# Patient Record
Sex: MALE | Race: WHITE | Employment: UNEMPLOYED | URBAN - METROPOLITAN AREA
[De-identification: names, ages, dates, MRNs, and addresses within clinical notes are randomized per-mention and may not be internally consistent; named-entity substitution may affect disease eponyms.]

---

## 2018-02-06 ENCOUNTER — HOSPITAL ENCOUNTER (OUTPATIENT)
Dept: MRI IMAGING | Age: 53
Discharge: HOME | End: 2018-02-06
Attending: PSYCHIATRY & NEUROLOGY
Payer: MEDICARE

## 2018-02-06 DIAGNOSIS — R26.89 BALANCE PROBLEM: ICD-10-CM

## 2018-02-06 PROCEDURE — 74011250636 HC RX REV CODE- 250/636: Performed by: PSYCHIATRY & NEUROLOGY

## 2018-02-06 PROCEDURE — 72141 MRI NECK SPINE W/O DYE: CPT

## 2018-02-06 PROCEDURE — 70553 MRI BRAIN STEM W/O & W/DYE: CPT

## 2018-02-06 PROCEDURE — A9577 INJ MULTIHANCE: HCPCS | Performed by: PSYCHIATRY & NEUROLOGY

## 2018-02-06 RX ORDER — SODIUM CHLORIDE 0.9 % (FLUSH) 0.9 %
10 SYRINGE (ML) INJECTION
Status: COMPLETED | OUTPATIENT
Start: 2018-02-06 | End: 2018-02-06

## 2018-02-06 RX ADMIN — Medication 10 ML: at 18:25

## 2018-02-06 RX ADMIN — GADOBENATE DIMEGLUMINE 20 ML: 529 INJECTION, SOLUTION INTRAVENOUS at 18:24

## 2018-06-27 ENCOUNTER — HOSPITAL ENCOUNTER (OUTPATIENT)
Dept: PHYSICAL THERAPY | Age: 53
Discharge: HOME | End: 2018-06-27
Attending: PSYCHIATRY & NEUROLOGY
Payer: MEDICARE

## 2018-06-27 DIAGNOSIS — M79.671 PAIN IN BOTH FEET: ICD-10-CM

## 2018-06-27 DIAGNOSIS — M79.672 PAIN IN BOTH FEET: ICD-10-CM

## 2018-06-27 DIAGNOSIS — R20.2 NUMBNESS AND TINGLING OF BOTH FEET: ICD-10-CM

## 2018-06-27 DIAGNOSIS — G62.9 NEUROPATHY: ICD-10-CM

## 2018-06-27 DIAGNOSIS — R20.0 NUMBNESS AND TINGLING OF BOTH FEET: ICD-10-CM

## 2018-06-27 PROCEDURE — 97112 NEUROMUSCULAR REEDUCATION: CPT

## 2018-06-27 PROCEDURE — G8979 MOBILITY GOAL STATUS: HCPCS

## 2018-06-27 PROCEDURE — G8978 MOBILITY CURRENT STATUS: HCPCS

## 2018-06-27 PROCEDURE — 97162 PT EVAL MOD COMPLEX 30 MIN: CPT

## 2018-06-27 NOTE — THERAPY EVALUATION
Heriberto Carter  : 1965  Primary: Edilma Gonsales Medicare Hmo  Secondary:  [address] at [place] [id number] [facility], [address] [place] [address],[address], [address] [facility]  [phone]   [phone]          OUTPATIENT PHYSICAL THERAPY:Initial Assessment    ICD-10: Treatment Diagnosis:   · Other abnormalities of gait and mobility (R26.89)  · Difficulty in walking, not elsewhere classified (R26.2)  Precautions/Allergies:   Review of patient's allergies indicates no known allergies. Fall Risk Score: 2 (? 5 = High Risk)  MD Orders: Evaluate and Treat MEDICAL/REFERRING DIAGNOSIS:  Numbness and tingling of both feet [R20.0, R20.2]  Pain in both feet [M79.671, M79.672]  Neuropathy [G62.9]   DATE OF ONSET: Chronic  REFERRING PHYSICIAN: Rafiq KOEHLER DO  RETURN PHYSICIAN APPOINTMENT: TBD     INITIAL ASSESSMENT:  Mr. Elida Martinez presents with decreased mobility, decreased strength, decreased safety with ambulation, and decreased balance secondary to degenerative changes. After discussing with patient, he agreed he would benefit from physical therapy to improve above deficits. Please sign this plan of treatment if you concur. Thank you for the opportunity to serve this patient. PROBLEM LIST (Impacting functional limitations):  1. Decreased Strength  2. Decreased Balance  3. Decreased Activity Tolerance INTERVENTIONS PLANNED:  1. Balance Exercise  2. Gait Training  3. Home Exercise Program (HEP)  4. Neuromuscular Re-education/Strengthening  5. Therapeutic Exercise/Strengthening   TREATMENT PLAN:  Effective Dates: 2018 TO 2018 (90 days). Frequency/Duration: 2 times a week for 90 Days  GOALS: (Goals have been discussed and agreed upon with patient.)  Short-Term Functional Goals: Time Frame: 30 days  1. Patient will be independent with home exercise program without exacerbation of symptoms or cueing needed. Discharge Goals: Time Frame: 90 days  1.  Patient will be independent with all ADLs with minimal fear of falling and loss of balance with daily tasks. 2. Patient will report no fear avoidance with social or recreational activities due to fear of falling. 3. Patient will score greater than or equal to 39/56 on Joiner Balance Scale with minimal effect of imbalance on patient's ability to manage every day life activities. 4. Patient will score greater than or equal to 19/24 on Dynamic Gait Index with minimal effect of imbalance on patient's ability to manage every day life activities. Rehabilitation Potential For Stated Goals: Good  Regarding Elie Luis's therapy, I certify that the treatment plan above will be carried out by a therapist or under their direction. Thank you for this referral,  Kenya López, ANETA     Referring Physician Signature: Jovon Gore DO              Date                    The information in this section was collected on 6/27/2018 (except where otherwise noted). HISTORY:   History of Present Injury/Illness (Reason for Referral):  Patient reports that he began having trouble with his feet/walking about 2 years ago. He reports that he has fallen in the past.  He reports that he has numbness in his feet and ankles bilaterally. He reports that he know that most of his trouble is his fear of falling or becoming off balance. He reports that he has to use his cane when walking in the community, but is able to walk with nothing while in his home. He reports that he would like to improve his overall mobility and safety with ambulation so that he doesn't fall and isn't afraid of falling. Past Medical History/Comorbidities:   Mr. Monet Malcolm  has a past medical history of Hypertension; Other ill-defined conditions(799.89); and Psychiatric disorder. He also has no past medical history of Arthritis; Asthma; Autoimmune disease ([address] CAD (coronary artery disease); Cancer ([address] Chronic kidney disease; COPD; DEMENTIA; Diabetes ([address]  Endocrine disease; Gastrointestinal disorder; Heart failure ([facility] Infectious disease; Liver disease; Neurological disorder; PUD (peptic ulcer disease); Seizures ([facility] Sleep disorder; Stroke [facility]); or Thromboembolus ([facility] Mr. Seb Garcia  has a past surgical history that includes hx orthopaedic. Social History/Living Environment:   Home Environment: Private residence  Living Alone: No  Support Systems: Family member(s), Friends \ neighbors  Prior Level of Function/Work/Activity:  Independent  Dominant Side:         RIGHT  Personal Factors:          Sex:  male        Age:  48 y.o.   Current Medications:     Medications (PRN)    Date Last Reviewed:  6/27/2018    Number of Personal Factors/Comorbidities that affect the Plan of Care: 1-2: MODERATE COMPLEXITY   EXAMINATION:   Observation/Orthostatic Postural Assessment:          Posture Assessment: Rounded shoulders, Forward head, Wide stance with feet beyond shoulder width apart, B LE in external rotation   Functional Mobility:         Gait/Ambulation:     Base of Support: Center of gravity altered  Speed/Haleigh: Pace decreased (<100 feet/min)  Step Length: Left shortened, Right shortened  Swing Pattern: Left asymmetrical, Right asymmetrical  Stance: Left increased, Right increased  Gait Abnormalities: Antalgic - B LE in abduction and external rotation  Ambulation - Level of Assistance: Modified independent  Assistive Device: Cane, straight  · Interventions: Verbal cues, Safety awareness training          Transfers:     Sit to Stand: Modified independent  Stand to Sit: Modified independent  Stand Pivot Transfers: Modified independent  Bed to Chair: Modified independent  Lateral Transfers: Modified independent         Bed Mobility:     Rolling: Independent  Supine to Sit: Independent  Sit to Supine: Independent  Scooting: Independent   Strength:     LE STRENGTH:  4/5 hip flexion, 2/5 hip abduction, 2/5 hip adduction, 2/5 hip extension, 3/5 knee extension, 3/5 knee flexion, 2/5 ankle dorsiflexion, 3/5 ankle plantarflexion, 2/5 ankle inversion, 2/5 ankle eversion. Sensation:         Intact to mid-shin/gastroc region - reports numbness/decreased sensation to toes  Postural Control & Balance:  · Joiner Balance Scale:  19/56.   (A score less than 45/56 indicates high risk of falls)     · Dynamic Gait Index:  7/24. (A score less than or equal to19/24 is abnormal and predictive of falls)        Body Structures Involved:  1. Nerves  2. Muscles Body Functions Affected:  1. Neuromusculoskeletal  2. Movement Related Activities and Participation Affected:  1. Mobility  2. Self Care   Number of elements (examined above) that affect the Plan of Care: 4+: HIGH COMPLEXITY   CLINICAL PRESENTATION:   Presentation: Evolving clinical presentation with changing clinical characteristics: MODERATE COMPLEXITY   CLINICAL DECISION MAKING:   Outcome Measure: Tool Used: Joiner Balance Scale  Score:  Initial: 19/56 Most Recent: X/56 (Date: -- )   Interpretation of Score: Each section is scored on a 0-4 scale, 0 representing the patients inability to perform the task and 4 representing independence. The scores of each section are added together for a total score of 56. The higher the patients score, the more independent the patient is. Any score below 45 indicates increased risk for falls. Score 56 55-45 44-34 33-23 22-12 11-1 0   Modifier CH CI CJ CK CL CM CN     Tool Used: Dynamic Gait Index  Score:  Initial: 7/24 Most Recent: X/24 (Date: -- )   Interpretation of Score: Each section is scored on a 0-3 scale, 0 representing the patients inability to perform the task and 3 representing independence. The scores of each section are added together for a total score of 24. Any score below 19 indicates increased risk for falls. Score 24 23-19 18-15 14-10 9-5 4-1 0   Modifier CH CI CJ CK CL CM CN     ?  Mobility - Walking and Moving Around:     - CURRENT STATUS: CL - 60%-79% impaired, limited or restricted    - GOAL STATUS: CJ - 20%-39% impaired, limited or restricted    - D/C STATUS:  ---------------To be determined---------------    Medical Necessity:   · Patient is expected to demonstrate progress in strength, range of motion, balance and coordination to improve safety during daily activities. · Patient demonstrates good rehab potential due to higher previous functional level. · Skilled intervention continues to be required due to decreased mobility. Reason for Services/Other Comments:  · Patient continues to demonstrate capacity to improve overall functional mobility which will increase independence and increase safety. Use of outcome tool(s) and clinical judgement create a POC that gives a: Questionable prediction of patient's progress: MODERATE COMPLEXITY            TREATMENT:   (In addition to Assessment/Re-Assessment sessions the following treatments were rendered)  Pre-treatment Symptoms/Complaints:  6/27/2018: Patient reports he is tired of being afraid he will fall   Pain: Initial: Pain Intensity 1: 0  Post Session:  0/10     NEUROMUSCULAR RE-EDUCATION: (15 minutes):  Exercise/activities per grid below to improve balance, coordination, kinesthetic sense, posture and proprioception. Required minimal visual, verbal and manual cues to promote static and dynamic balance in standing, promote coordination of bilateral, lower extremity(s) and promote motor control of bilateral, lower extremity(s). Date:  6/27/2018   Activity/Exercise Parameters   Static stance with eyes open with narrow base of support HEP   Static stance with eyes closed in normal stance HEP   Standing hip abduction HEP      Med[employer] Portal  Treatment/Session Assessment:    · Response to Treatment:  Patient tolerated assessment without loss of balance, but increased fatigue. Patient verbalized and demonstrated understanding of HEP. · Compliance with Program/Exercises: Will assess as treatment progresses.   · Recommendations/Intent for next treatment session: \"Next visit will focus on advancements to more challenging activities\".   Total Treatment Duration:  PT Patient Time In/Time Out  Time In: 4635  Time Out: [address], PT

## 2018-06-27 NOTE — PROGRESS NOTES
Rick Da Silva  : 1965 [facility] [place] [address],[address], [place] [address].  [phone]   [phone]          OUTPATIENT ORTHOPAEDIC PHYSICAL THERAPY    NAME/AGE/GENDER: Rick Da Silva is a 48 y.o. male    DATE: 2018                       Ambulatory/Rehab Services H2 Model Falls Risk Assessment    Risk Factor Pts. ·   Confusion/Disorientation/Impulsivity  []    4 ·   Symptomatic Depression  []   2 ·   Altered Elimination  []   1 ·   Dizziness/Vertigo  []   1 ·   Gender (Male)  [x]   1 ·   Any administered antiepileptics (anticonvulsants):  []   2 ·   Any administered benzodiazepines:  []   1 ·   Visual Impairment (specify):  []   1 ·   Portable Oxygen Use  []   1 ·   Orthostatic ? BP  []   1 ·   History of Recent Falls (within 3 mos.)  []   5     Ability to Rise from Chair (choose one) Pts. ·   Ability to rise in a single movement  []   0 ·   Pushes up, successful in one attempt  [x]   1 ·   Multiple attempts, but unsuccessful  []   3 ·   Unable to rise without assistance  []   4   Total: (5 or greater = High Risk) 2     Falls Prevention Plan:   []                Physical Limitations to Exercise (specify):   []                Mobility Assistance Device (type):   []                Exercise/Equipment Adaptation (specify):    © [facility] of [place] [address] [place] [address] States Patent #[id number].  Federal Law prohibits the replication, distribution or use without written permission from [facility] of [address] [place] [address]

## 2018-07-10 ENCOUNTER — HOSPITAL ENCOUNTER (OUTPATIENT)
Dept: PHYSICAL THERAPY | Age: 53
Discharge: HOME | End: 2018-07-10
Attending: PSYCHIATRY & NEUROLOGY
Payer: MEDICARE

## 2018-07-10 PROCEDURE — 97112 NEUROMUSCULAR REEDUCATION: CPT

## 2018-07-10 NOTE — PROGRESS NOTES
Luane Shone  : 1965  Primary: Yoselyn Gonsales Medicare Hmo  Secondary:  [address] at [place] [id number] [facility], [address] [place] [address],[address], [facility].  [phone]   [phone]          OUTPATIENT PHYSICAL THERAPY:Daily Note    ICD-10: Treatment Diagnosis:   · Other abnormalities of gait and mobility (R26.89)  · Difficulty in walking, not elsewhere classified (R26.2)  Precautions/Allergies:   Review of patient's allergies indicates no known allergies. Fall Risk Score: 2 (? 5 = High Risk)  MD Orders: Evaluate and Treat MEDICAL/REFERRING DIAGNOSIS:  Numbness and tingling of both feet [R20.0, R20.2]  Pain in both feet [M79.671, M79.672]  Neuropathy [G62.9]   DATE OF ONSET: Chronic  REFERRING PHYSICIAN: Binu KOEHLER, DO  RETURN PHYSICIAN APPOINTMENT: TBD     INITIAL ASSESSMENT:  Mr. Katie Fajardo presents with decreased mobility, decreased strength, decreased safety with ambulation, and decreased balance secondary to degenerative changes. After discussing with patient, he agreed he would benefit from physical therapy to improve above deficits. Please sign this plan of treatment if you concur. Thank you for the opportunity to serve this patient. PROBLEM LIST (Impacting functional limitations):  1. Decreased Strength  2. Decreased Balance  3. Decreased Activity Tolerance INTERVENTIONS PLANNED:  1. Balance Exercise  2. Gait Training  3. Home Exercise Program (HEP)  4. Neuromuscular Re-education/Strengthening  5. Therapeutic Exercise/Strengthening   TREATMENT PLAN:  Effective Dates: 2018 TO 2018 (90 days). Frequency/Duration: 2 times a week for 90 Days  GOALS: (Goals have been discussed and agreed upon with patient.)  Short-Term Functional Goals: Time Frame: 30 days  1. Patient will be independent with home exercise program without exacerbation of symptoms or cueing needed. Discharge Goals: Time Frame: 90 days  1.  Patient will be independent with all ADLs with minimal fear of falling and loss of balance with daily tasks. 2. Patient will report no fear avoidance with social or recreational activities due to fear of falling. 3. Patient will score greater than or equal to 39/56 on Joiner Balance Scale with minimal effect of imbalance on patient's ability to manage every day life activities. 4. Patient will score greater than or equal to 19/24 on Dynamic Gait Index with minimal effect of imbalance on patient's ability to manage every day life activities. Rehabilitation Potential For Stated Goals: Good  Regarding Elie Luis's therapy, I certify that the treatment plan above will be carried out by a therapist or under their direction. Thank you for this referral,  Surekha Liu PT     Referring Physician Signature: Jovon Gore DO              Date                    The information in this section was collected on 6/27/2018 (except where otherwise noted). HISTORY:   History of Present Injury/Illness (Reason for Referral):  Patient reports that he began having trouble with his feet/walking about 2 years ago. He reports that he has fallen in the past.  He reports that he has numbness in his feet and ankles bilaterally. He reports that he know that most of his trouble is his fear of falling or becoming off balance. He reports that he has to use his cane when walking in the community, but is able to walk with nothing while in his home. He reports that he would like to improve his overall mobility and safety with ambulation so that he doesn't fall and isn't afraid of falling. Past Medical History/Comorbidities:   Mr. Monet Malcolm  has a past medical history of Hypertension; Other ill-defined conditions(799.89); and Psychiatric disorder. He also has no past medical history of Arthritis; Asthma; Autoimmune disease ([address] CAD (coronary artery disease); Cancer ([address] Chronic kidney disease; COPD; DEMENTIA; Diabetes ([address]  Endocrine disease; Gastrointestinal disorder; Heart failure ([facility] Infectious disease; Liver disease; Neurological disorder; PUD (peptic ulcer disease); Seizures ([facility] Sleep disorder; Stroke [facility]); or Thromboembolus ([facility] Mr. Mary Lou Gregory  has a past surgical history that includes hx orthopaedic. Social History/Living Environment:   Home Environment: Private residence  Living Alone: No  Support Systems: Family member(s), Friends \ neighbors  Prior Level of Function/Work/Activity:  Independent  Dominant Side:         RIGHT  Personal Factors:          Sex:  male        Age:  48 y.o.   Current Medications:     Medications (PRN)    Date Last Reviewed:  7/10/2018    Number of Personal Factors/Comorbidities that affect the Plan of Care: 1-2: MODERATE COMPLEXITY   EXAMINATION:   Observation/Orthostatic Postural Assessment:          Posture Assessment: Rounded shoulders, Forward head, Wide stance with feet beyond shoulder width apart, B LE in external rotation   Functional Mobility:         Gait/Ambulation:     Base of Support: Center of gravity altered  Speed/Haleigh: Pace decreased (<100 feet/min)  Step Length: Left shortened, Right shortened  Swing Pattern: Left asymmetrical, Right asymmetrical  Stance: Left increased, Right increased  Gait Abnormalities: Antalgic - B LE in abduction and external rotation  Ambulation - Level of Assistance: Modified independent  Assistive Device: Cane, straight  · Interventions: Verbal cues, Safety awareness training          Transfers:     Sit to Stand: Modified independent  Stand to Sit: Modified independent  Stand Pivot Transfers: Modified independent  Bed to Chair: Modified independent  Lateral Transfers: Modified independent         Bed Mobility:     Rolling: Independent  Supine to Sit: Independent  Sit to Supine: Independent  Scooting: Independent   Strength:     LE STRENGTH:  4/5 hip flexion, 2/5 hip abduction, 2/5 hip adduction, 2/5 hip extension, 3/5 knee extension, 3/5 knee flexion, 2/5 ankle dorsiflexion, 3/5 ankle plantarflexion, 2/5 ankle inversion, 2/5 ankle eversion. Sensation:         Intact to mid-shin/gastroc region - reports numbness/decreased sensation to toes  Postural Control & Balance:  · Joiner Balance Scale:  19/56.   (A score less than 45/56 indicates high risk of falls)     · Dynamic Gait Index:  7/24. (A score less than or equal to19/24 is abnormal and predictive of falls)        Body Structures Involved:  1. Nerves  2. Muscles Body Functions Affected:  1. Neuromusculoskeletal  2. Movement Related Activities and Participation Affected:  1. Mobility  2. Self Care   Number of elements (examined above) that affect the Plan of Care: 4+: HIGH COMPLEXITY   CLINICAL PRESENTATION:   Presentation: Evolving clinical presentation with changing clinical characteristics: MODERATE COMPLEXITY   CLINICAL DECISION MAKING:   Outcome Measure: Tool Used: Joiner Balance Scale  Score:  Initial: 19/56 Most Recent: X/56 (Date: -- )   Interpretation of Score: Each section is scored on a 0-4 scale, 0 representing the patients inability to perform the task and 4 representing independence. The scores of each section are added together for a total score of 56. The higher the patients score, the more independent the patient is. Any score below 45 indicates increased risk for falls. Score 56 55-45 44-34 33-23 22-12 11-1 0   Modifier CH CI CJ CK CL CM CN     Tool Used: Dynamic Gait Index  Score:  Initial: 7/24 Most Recent: X/24 (Date: -- )   Interpretation of Score: Each section is scored on a 0-3 scale, 0 representing the patients inability to perform the task and 3 representing independence. The scores of each section are added together for a total score of 24. Any score below 19 indicates increased risk for falls. Score 24 23-19 18-15 14-10 9-5 4-1 0   Modifier CH CI CJ CK CL CM CN     ?  Mobility - Walking and Moving Around:     - CURRENT STATUS: CL - 60%-79% impaired, limited or restricted    - GOAL STATUS: CJ - 20%-39% impaired, limited or restricted    - D/C STATUS:  ---------------To be determined---------------    Medical Necessity:   · Patient is expected to demonstrate progress in strength, range of motion, balance and coordination to improve safety during daily activities. · Patient demonstrates good rehab potential due to higher previous functional level. · Skilled intervention continues to be required due to decreased mobility. Reason for Services/Other Comments:  · Patient continues to demonstrate capacity to improve overall functional mobility which will increase independence and increase safety. 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 No falls\". Patient arrived 15 minutes late for his appointment. Pain: Initial: Pain Intensity 1: 0  Post Session:  0/10     NEUROMUSCULAR RE-EDUCATION: (30 minutes):  Exercise/activities per grid below to improve balance, coordination, kinesthetic sense, posture and proprioception. Required minimal visual, verbal and manual cues to promote static and dynamic balance in standing, promote coordination of bilateral, lower extremity(s) and promote motor control of bilateral, lower extremity(s).    Balance/Vestibular Treatment:   Activity   Date  7/10/18 Date Date Date Date Date   Activity/Exercise   Sets/reps/equipment Sets/reps/  equipment Sets/reps/  equipment Sets/reps/  equipment Sets/reps/  equipment Sets/reps/  equipment   Walking with head turns     4 laps        Walking with head up & down             Step ups     6 inch  2x10        Step taps     6 inch  2x10        Marching   4 laps        Sidestepping   4 laps        Crossovers           Phuong Walking  backwards             Tandem walking           Weaving in/out of cones             Picking up cones             Stepping over half foam     2x10        Ball toss           Kick ball           Figure 8s            Circles right/left           Walking with 360 degree turns           Spirals           Weight shifting:    Left & Right             Weight shifting: Forward & Backward              Static Standing Balance     Blue foam eyes open/eyes closed        Standing with feet apart             Standing with feet together             Standing with feet semitandem           Standing with feet tandem           Single leg stance           X1/X2 Viewing exercises             Hallpike-[place] testing for BPPV (Benign Paroxysmal Positional Vertigo)             Monet-Daroff exercises           Canalith Repositioning treatment/Epley Maneuver  for BPPV (Benign Paroxysmal Positional Vertigo)           Smart Equit[employer] Training: See scanned report. [employer] Portal  Treatment/Session Assessment:    · Response to Treatment:  Patient has increased fear/anxiety with balance exercises. Continue to progress to tolerance. · Compliance with Program/Exercises: Will assess as treatment progresses. · Recommendations/Intent for next treatment session: \"Next visit will focus on advancements to more challenging activities\".   Total Treatment Duration:  PT Patient Time In/Time Out  Time In: 1600  Time Out: Fabian Spencer 4681

## 2018-07-13 ENCOUNTER — HOSPITAL ENCOUNTER (OUTPATIENT)
Dept: PHYSICAL THERAPY | Age: 53
Discharge: HOME | End: 2018-07-13
Attending: PSYCHIATRY & NEUROLOGY
Payer: MEDICARE

## 2018-07-13 PROCEDURE — 97112 NEUROMUSCULAR REEDUCATION: CPT

## 2018-07-13 NOTE — PROGRESS NOTES
Haroldo Mesa  : 1965  Primary: Jorge Gonsales [employer] Hmo  Secondary:  [address]  at [place] [address] [facility], [address] [place] [address],[address], [facility] [address]  [phone]   [phone]          OUTPATIENT PHYSICAL THERAPY:Daily Note    ICD-10: Treatment Diagnosis:   · Other abnormalities of gait and mobility (R26.89)  · Difficulty in walking, not elsewhere classified (R26.2)  Precautions/Allergies:   Review of patient's allergies indicates no known allergies. Fall Risk Score: 2 (? 5 = High Risk)  MD Orders: Evaluate and Treat MEDICAL/REFERRING DIAGNOSIS:  Numbness and tingling of both feet [R20.0, R20.2]  Pain in both feet [M79.671, M79.672]  Neuropathy [G62.9]   DATE OF ONSET: Chronic  REFERRING PHYSICIAN: DO BETI Garcia PHYSICIAN APPOINTMENT: TBD     ASSESSMENT:  Mr. Lillian Mcguire presents with decreased mobility, decreased strength, decreased safety with ambulation, and decreased balance secondary to degenerative changes. Patient has attended a total of 3 scheduled physical therapy visits including initial evaluation on 2018. Treatment has consisted of balance and strengthening to improve overall mobility, stability, and performance with activities of daily living. PROBLEM LIST (Impacting functional limitations):  1. Decreased Strength  2. Decreased Balance  3. Decreased Activity Tolerance INTERVENTIONS PLANNED:  1. Balance Exercise  2. Gait Training  3. Home Exercise Program (HEP)  4. Neuromuscular Re-education/Strengthening  5. Therapeutic Exercise/Strengthening   TREATMENT PLAN:  Effective Dates: 2018 TO 2018 (90 days). Frequency/Duration: 2 times a week for 90 Days  GOALS: (Goals have been discussed and agreed upon with patient.)  Short-Term Functional Goals: Time Frame: 30 days  1. Patient will be independent with home exercise program without exacerbation of symptoms or cueing needed. Discharge Goals: Time Frame: 90 days  1.  Patient will be independent with all ADLs with minimal fear of falling and loss of balance with daily tasks. 2. Patient will report no fear avoidance with social or recreational activities due to fear of falling. 3. Patient will score greater than or equal to 39/56 on Joiner Balance Scale with minimal effect of imbalance on patient's ability to manage every day life activities. 4. Patient will score greater than or equal to 19/24 on Dynamic Gait Index with minimal effect of imbalance on patient's ability to manage every day life activities. Rehabilitation Potential For Stated Goals: Good            The information in this section was collected on 6/27/2018 (except where otherwise noted). HISTORY:   History of Present Injury/Illness (Reason for Referral):  Patient reports that he began having trouble with his feet/walking about 2 years ago. He reports that he has fallen in the past.  He reports that he has numbness in his feet and ankles bilaterally. He reports that he know that most of his trouble is his fear of falling or becoming off balance. He reports that he has to use his cane when walking in the community, but is able to walk with nothing while in his home. He reports that he would like to improve his overall mobility and safety with ambulation so that he doesn't fall and isn't afraid of falling. Past Medical History/Comorbidities:   Mr. Elida Martinez  has a past medical history of Hypertension; Other ill-defined conditions(799.89); and Psychiatric disorder. He also has no past medical history of Arthritis; Asthma; Autoimmune disease ([address] CAD (coronary artery disease); Cancer ([address] Chronic kidney disease; COPD; DEMENTIA; Diabetes ([address] Endocrine disease; Gastrointestinal disorder; Heart failure ([address] Infectious disease; Liver disease; Neurological disorder; PUD (peptic ulcer disease); Seizures ([address] Sleep disorder; Stroke [facility]); or Thromboembolus ([address]   Mr. Elida Martinez  has a past surgical history that includes hx orthopaedic. Social History/Living Environment:   Home Environment: Private residence  Living Alone: No  Support Systems: Family member(s), Friends \ neighbors  Prior Level of Function/Work/Activity:  Independent  Dominant Side:         RIGHT  Personal Factors:          Sex:  male        Age:  48 y.o. Current Medications:     Medications (PRN)    Date Last Reviewed:  7/13/2018    Number of Personal Factors/Comorbidities that affect the Plan of Care: 1-2: MODERATE COMPLEXITY   EXAMINATION:   Observation/Orthostatic Postural Assessment:          Posture Assessment: Rounded shoulders, Forward head, Wide stance with feet beyond shoulder width apart, B LE in external rotation   Functional Mobility:         Gait/Ambulation:     Base of Support: Center of gravity altered  Speed/Haleigh: Pace decreased (<100 feet/min)  Step Length: Left shortened, Right shortened  Swing Pattern: Left asymmetrical, Right asymmetrical  Stance: Left increased, Right increased  Gait Abnormalities: Antalgic - B LE in abduction and external rotation  Ambulation - Level of Assistance: Modified independent  Assistive Device: Cane, straight  · Interventions: Verbal cues, Safety awareness training          Transfers:     Sit to Stand: Modified independent  Stand to Sit: Modified independent  Stand Pivot Transfers: Modified independent  Bed to Chair: Modified independent  Lateral Transfers: Modified independent         Bed Mobility:     Rolling: Independent  Supine to Sit: Independent  Sit to Supine: Independent  Scooting: Independent   Strength:     LE STRENGTH:  4/5 hip flexion, 2/5 hip abduction, 2/5 hip adduction, 2/5 hip extension, 3/5 knee extension, 3/5 knee flexion, 2/5 ankle dorsiflexion, 3/5 ankle plantarflexion, 2/5 ankle inversion, 2/5 ankle eversion.   Sensation:         Intact to mid-shin/gastroc region - reports numbness/decreased sensation to toes  Postural Control & Balance:  · Joiner Balance Scale:  19/56.   (A score less than 45/56 indicates high risk of falls)     · Dynamic Gait Index:  7/24. (A score less than or equal to19/24 is abnormal and predictive of falls)        Body Structures Involved:  1. Nerves  2. Muscles Body Functions Affected:  1. Neuromusculoskeletal  2. Movement Related Activities and Participation Affected:  1. Mobility  2. Self Care   Number of elements (examined above) that affect the Plan of Care: 4+: HIGH COMPLEXITY   CLINICAL PRESENTATION:   Presentation: Evolving clinical presentation with changing clinical characteristics: MODERATE COMPLEXITY   CLINICAL DECISION MAKING:   Outcome Measure: Tool Used: Joiner Balance Scale  Score:  Initial: 19/56 Most Recent: X/56 (Date: -- )   Interpretation of Score: Each section is scored on a 0-4 scale, 0 representing the patients inability to perform the task and 4 representing independence. The scores of each section are added together for a total score of 56. The higher the patients score, the more independent the patient is. Any score below 45 indicates increased risk for falls. Score 56 55-45 44-34 33-23 22-12 11-1 0   Modifier CH CI CJ CK CL CM CN     Tool Used: Dynamic Gait Index  Score:  Initial: 7/24 Most Recent: X/24 (Date: -- )   Interpretation of Score: Each section is scored on a 0-3 scale, 0 representing the patients inability to perform the task and 3 representing independence. The scores of each section are added together for a total score of 24. Any score below 19 indicates increased risk for falls. Score 24 23-19 18-15 14-10 9-5 4-1 0   Modifier CH CI CJ CK CL CM CN     ?  Mobility - Walking and Moving Around:     - CURRENT STATUS: CL - 60%-79% impaired, limited or restricted    - GOAL STATUS: CJ - 20%-39% impaired, limited or restricted    - D/C STATUS:  ---------------To be determined---------------    Medical Necessity:   · Patient is expected to demonstrate progress in strength, range of motion, balance and coordination to improve safety during daily activities. · Patient demonstrates good rehab potential due to higher previous functional level. · Skilled intervention continues to be required due to decreased mobility. Reason for Services/Other Comments:  · Patient continues to demonstrate capacity to improve overall functional mobility which will increase independence and increase safety. Use of outcome tool(s) and clinical judgement create a POC that gives a: Questionable prediction of patient's progress: MODERATE COMPLEXITY            TREATMENT:   (In addition to Assessment/Re-Assessment sessions the following treatments were rendered)  Pre-treatment Symptoms/Complaints:  7/13/2018: Patient reports his anxiety is up today so he is not feeling well. Pain: Initial: Pain Intensity 1: 0  Post Session:  0/10     NEUROMUSCULAR RE-EDUCATION: (45 minutes):  Exercise/activities per grid below to improve balance, coordination, kinesthetic sense, posture and proprioception. Required minimal visual, verbal and manual cues to promote static and dynamic balance in standing, promote coordination of bilateral, lower extremity(s) and promote motor control of bilateral, lower extremity(s).    Balance/Vestibular Treatment:   Activity   Date  7/10/18 Date  7/13/2018 Date   Activity/Exercise   Sets/reps/equipment Sets/reps/  equipment Sets/reps/  equipment   Walking with head turns     4 laps 4 laps    Walking with head up & down          Step ups     6 inch  2x10     Step taps     6 inch  2x10 6 inch  2x10    Marching   4 laps 4 laps    Sidestepping   4 laps 4 laps    Crossovers        [place]        Walking  backwards          Walking with narrow base of support    4 laps    Weaving in/out of cones          Picking up cones          Stepping over half foam     2x10     [employer]        Kick ball        Figure 8s         Circles right/left        Walking with 360 degree turns        Spirals        Weight shifting:    Left & Right          Weight shifting: Forward & Backward           Static Standing Balance     Blue foam eyes open/eyes closed Blue foam    Eyes open weight shifting lateral    Eyes closed  Static  3 reps  5 second holds    Standing with feet apart          Standing with feet together          Standing with feet semitandem    Blue foam    Eyes open weight shifting forward and backward    Eyes closed  Static  3 reps  5 second holds    Standing with feet tandem        Single leg stance          [facility] [place]  Treatment/Session Assessment:    · Response to Treatment:  Patient completed all exercises and was willing to try new exercises. Patient needed moderate encouragement to complete activities. No loss of balance. · Compliance with Program/Exercises: Will assess as treatment progresses. · Recommendations/Intent for next treatment session: \"Next visit will focus on advancements to more challenging activities\".   Total Treatment Duration:  PT Patient Time In/Time Out  Time In: 1430  Time Out: 300 [employer] [place], PT

## 2018-07-17 ENCOUNTER — HOSPITAL ENCOUNTER (OUTPATIENT)
Dept: PHYSICAL THERAPY | Age: 53
Discharge: HOME | End: 2018-07-17
Attending: PSYCHIATRY & NEUROLOGY
Payer: MEDICARE

## 2018-07-17 NOTE — PROGRESS NOTES
Marina Buckner  : 1965  Primary: Ro Gonsales Medicare Hmo  Secondary:  [address]  at [facility], [address] [place] [address],[address], [address] [facility]  [phone]   [phone]     OUTPATIENT DAILY NOTE    NAME/AGE/GENDER: Marina Buckner is a 48 y.o. male. DATE: 2018    Patient canceled for appointment today due to unknown reason. Will plan to follow up on next scheduled visit.     Doug Moss, PT

## 2018-07-19 ENCOUNTER — HOSPITAL ENCOUNTER (OUTPATIENT)
Dept: PHYSICAL THERAPY | Age: 53
Discharge: HOME | End: 2018-07-19
Attending: PSYCHIATRY & NEUROLOGY
Payer: MEDICARE

## 2018-07-19 PROCEDURE — 97112 NEUROMUSCULAR REEDUCATION: CPT

## 2018-07-19 NOTE — PROGRESS NOTES
Enoc Gutierres  : 1965  Primary: Essence Gonsales Medicare Hmo  Secondary:  [address] at [place] [id number] [facility], [address] [place] [address],[address], [facility] [address]  [phone]   [phone]          OUTPATIENT PHYSICAL THERAPY:Daily Note    ICD-10: Treatment Diagnosis:   · Other abnormalities of gait and mobility (R26.89)  · Difficulty in walking, not elsewhere classified (R26.2)  Precautions/Allergies:   Review of patient's allergies indicates no known allergies. Fall Risk Score: 2 (? 5 = High Risk)  MD Orders: Evaluate and Treat MEDICAL/REFERRING DIAGNOSIS:  Numbness and tingling of both feet [R20.0, R20.2]  Pain in both feet [M79.671, M79.672]  Neuropathy [G62.9]   DATE OF ONSET: Chronic  REFERRING PHYSICIAN: Carlos Gayle, DO  RETURN PHYSICIAN APPOINTMENT: TBD     ASSESSMENT:  Mr. Shama Martinez presents with decreased mobility, decreased strength, decreased safety with ambulation, and decreased balance secondary to degenerative changes. Patient has attended a total of 4 scheduled physical therapy visits including initial evaluation on 2018. Treatment has consisted of balance and strengthening to improve overall mobility, stability, and performance with activities of daily living. PROBLEM LIST (Impacting functional limitations):  1. Decreased Strength  2. Decreased Balance  3. Decreased Activity Tolerance INTERVENTIONS PLANNED:  1. Balance Exercise  2. Gait Training  3. Home Exercise Program (HEP)  4. Neuromuscular Re-education/Strengthening  5. Therapeutic Exercise/Strengthening   TREATMENT PLAN:  Effective Dates: 2018 TO 2018 (90 days). Frequency/Duration: 2 times a week for 90 Days  GOALS: (Goals have been discussed and agreed upon with patient.)  Short-Term Functional Goals: Time Frame: 30 days  1. Patient will be independent with home exercise program without exacerbation of symptoms or cueing needed. Discharge Goals: Time Frame: 90 days  1.  Patient will be independent with all ADLs with minimal fear of falling and loss of balance with daily tasks. 2. Patient will report no fear avoidance with social or recreational activities due to fear of falling. 3. Patient will score greater than or equal to 39/56 on Joiner Balance Scale with minimal effect of imbalance on patient's ability to manage every day life activities. 4. Patient will score greater than or equal to 19/24 on Dynamic Gait Index with minimal effect of imbalance on patient's ability to manage every day life activities. Rehabilitation Potential For Stated Goals: Good            The information in this section was collected on 6/27/2018 (except where otherwise noted). HISTORY:   History of Present Injury/Illness (Reason for Referral):  Patient reports that he began having trouble with his feet/walking about 2 years ago. He reports that he has fallen in the past.  He reports that he has numbness in his feet and ankles bilaterally. He reports that he know that most of his trouble is his fear of falling or becoming off balance. He reports that he has to use his cane when walking in the community, but is able to walk with nothing while in his home. He reports that he would like to improve his overall mobility and safety with ambulation so that he doesn't fall and isn't afraid of falling. Past Medical History/Comorbidities:   Mr. Jenni Beth  has a past medical history of Hypertension; Other ill-defined conditions(799.89); and Psychiatric disorder. He also has no past medical history of Arthritis; Asthma; Autoimmune disease ([address] CAD (coronary artery disease); Cancer ([address] Chronic kidney disease; COPD; DEMENTIA; Diabetes ([address] Endocrine disease; Gastrointestinal disorder; Heart failure ([address] Infectious disease; Liver disease; Neurological disorder; PUD (peptic ulcer disease); Seizures ([address] Sleep disorder; Stroke [facility]); or Thromboembolus ([address]   Mr. Jenni Beth  has a past surgical history that includes hx orthopaedic. Social History/Living Environment:   Home Environment: Private residence  Living Alone: No  Support Systems: Family member(s), Friends \ neighbors  Prior Level of Function/Work/Activity:  Independent  Dominant Side:         RIGHT  Personal Factors:          Sex:  male        Age:  48 y.o. Current Medications:     Medications (PRN)    Date Last Reviewed:  7/19/2018    Number of Personal Factors/Comorbidities that affect the Plan of Care: 1-2: MODERATE COMPLEXITY   EXAMINATION:   Observation/Orthostatic Postural Assessment:          Posture Assessment: Rounded shoulders, Forward head, Wide stance with feet beyond shoulder width apart, B LE in external rotation   Functional Mobility:         Gait/Ambulation:     Base of Support: Center of gravity altered  Speed/Haleigh: Pace decreased (<100 feet/min)  Step Length: Left shortened, Right shortened  Swing Pattern: Left asymmetrical, Right asymmetrical  Stance: Left increased, Right increased  Gait Abnormalities: Antalgic - B LE in abduction and external rotation  Ambulation - Level of Assistance: Modified independent  Assistive Device: Cane, straight  · Interventions: Verbal cues, Safety awareness training          Transfers:     Sit to Stand: Modified independent  Stand to Sit: Modified independent  Stand Pivot Transfers: Modified independent  Bed to Chair: Modified independent  Lateral Transfers: Modified independent         Bed Mobility:     Rolling: Independent  Supine to Sit: Independent  Sit to Supine: Independent  Scooting: Independent   Strength:     LE STRENGTH:  4/5 hip flexion, 2/5 hip abduction, 2/5 hip adduction, 2/5 hip extension, 3/5 knee extension, 3/5 knee flexion, 2/5 ankle dorsiflexion, 3/5 ankle plantarflexion, 2/5 ankle inversion, 2/5 ankle eversion.   Sensation:         Intact to mid-shin/gastroc region - reports numbness/decreased sensation to toes  Postural Control & Balance:  · Joiner Balance Scale:  19/56.   (A score less than 45/56 indicates high risk of falls)     · Dynamic Gait Index:  7/24. (A score less than or equal to19/24 is abnormal and predictive of falls)        Body Structures Involved:  1. Nerves  2. Muscles Body Functions Affected:  1. Neuromusculoskeletal  2. Movement Related Activities and Participation Affected:  1. Mobility  2. Self Care   Number of elements (examined above) that affect the Plan of Care: 4+: HIGH COMPLEXITY   CLINICAL PRESENTATION:   Presentation: Evolving clinical presentation with changing clinical characteristics: MODERATE COMPLEXITY   CLINICAL DECISION MAKING:   Outcome Measure: Tool Used: Joiner Balance Scale  Score:  Initial: 19/56 Most Recent: X/56 (Date: -- )   Interpretation of Score: Each section is scored on a 0-4 scale, 0 representing the patients inability to perform the task and 4 representing independence. The scores of each section are added together for a total score of 56. The higher the patients score, the more independent the patient is. Any score below 45 indicates increased risk for falls. Score 56 55-45 44-34 33-23 22-12 11-1 0   Modifier CH CI CJ CK CL CM CN     Tool Used: Dynamic Gait Index  Score:  Initial: 7/24 Most Recent: X/24 (Date: -- )   Interpretation of Score: Each section is scored on a 0-3 scale, 0 representing the patients inability to perform the task and 3 representing independence. The scores of each section are added together for a total score of 24. Any score below 19 indicates increased risk for falls. Score 24 23-19 18-15 14-10 9-5 4-1 0   Modifier CH CI CJ CK CL CM CN     ?  Mobility - Walking and Moving Around:     - CURRENT STATUS: CL - 60%-79% impaired, limited or restricted    - GOAL STATUS: CJ - 20%-39% impaired, limited or restricted    - D/C STATUS:  ---------------To be determined---------------    Medical Necessity:   · Patient is expected to demonstrate progress in strength, range of motion, balance and coordination to improve safety during daily activities. · Patient demonstrates good rehab potential due to higher previous functional level. · Skilled intervention continues to be required due to decreased mobility. Reason for Services/Other Comments:  · Patient continues to demonstrate capacity to improve overall functional mobility which will increase independence and increase safety. Use of outcome tool(s) and clinical judgement create a POC that gives a: Questionable prediction of patient's progress: MODERATE COMPLEXITY            TREATMENT:   (In addition to Assessment/Re-Assessment sessions the following treatments were rendered)  Pre-treatment Symptoms/Complaints:  7/19/2018: Patient has had no falls. Pain: Initial: Pain Intensity 1: 0  Post Session:  0/10     NEUROMUSCULAR RE-EDUCATION: (45 minutes):  Exercise/activities per grid below to improve balance, coordination, kinesthetic sense, posture and proprioception. Required minimal visual, verbal and manual cues to promote static and dynamic balance in standing, promote coordination of bilateral, lower extremity(s) and promote motor control of bilateral, lower extremity(s).    Balance/Vestibular Treatment:   Activity   Date  7/10/18 Date  7/13/2018 Date  7/19/18   Activity/Exercise   Sets/reps/equipment Sets/reps/  equipment Sets/reps/  equipment   Walking with head turns     4 laps 4 laps 4 laps   Walking with head up & down          Step ups     6 inch  2x10  6 inch  2x10   Step taps     6 inch  2x10 6 inch  2x10    Marching   4 laps 4 laps 4 laps   Sidestepping   4 laps 4 laps 4 laps   Crossovers        [place]        Walking  backwards          Walking with narrow base of support    4 laps    Weaving in/out of cones       4 laps   Picking up cones          Stepping over half foam     2x10     [employer]        Kick ball        Figure 8s         Circles right/left        Walking with 360 degree turns        Spirals        Weight shifting:    Left & Right          Weight shifting: Forward & Backward           Static Standing Balance     Blue foam eyes open/eyes closed Blue foam    Eyes open weight shifting lateral    Eyes closed  Static  3 reps  5 second holds Blue foam    Eyes open weight shifting lateral    Eyes closed  Static  3 reps  5 second holds   Standing with feet apart          Standing with feet together          Standing with feet semitandem    Blue foam    Eyes open weight shifting forward and backward    Eyes closed  Static  3 reps  5 second holds Blue foam    Eyes open weight shifting forward and backward    Eyes closed  Static  3 reps  5 second holds   Standing with feet tandem        Single leg stance          [facility]  Treatment/Session Assessment:    · Response to Treatment:  Patient tolerated exercises without complaints. · Compliance with Program/Exercises: Will assess as treatment progresses. · Recommendations/Intent for next treatment session: \"Next visit will focus on advancements to more challenging activities\".   Total Treatment Duration:  PT Patient Time In/Time Out  Time In: 1515  Time Out: 1600 [facility], PT

## 2018-07-24 ENCOUNTER — HOSPITAL ENCOUNTER (OUTPATIENT)
Dept: PHYSICAL THERAPY | Age: 53
Discharge: HOME | End: 2018-07-24
Attending: PSYCHIATRY & NEUROLOGY
Payer: MEDICARE

## 2018-07-24 PROCEDURE — 97112 NEUROMUSCULAR REEDUCATION: CPT

## 2018-07-24 NOTE — PROGRESS NOTES
Coleman Da Silva  : 1965  Primary: Clayton Gonsales [employer] Hmo  Secondary:  Dexter Harkins at [facility], [address] [address],[address], [address] [facility]  [phone]   [phone]          OUTPATIENT PHYSICAL THERAPY:Daily Note    ICD-10: Treatment Diagnosis:   · Other abnormalities of gait and mobility (R26.89)  · Difficulty in walking, not elsewhere classified (R26.2)  Precautions/Allergies:   Review of patient's allergies indicates no known allergies. Fall Risk Score: 2 (? 5 = High Risk)  MD Orders: Evaluate and Treat MEDICAL/REFERRING DIAGNOSIS:  Numbness and tingling of both feet [R20.0, R20.2]  Pain in both feet [M79.671, M79.672]  Neuropathy [G62.9]   DATE OF ONSET: Chronic  REFERRING PHYSICIAN: DO BETI Correa PHYSICIAN APPOINTMENT: TBD     ASSESSMENT:  Mr. Mary Lou Gregory presents with decreased mobility, decreased strength, decreased safety with ambulation, and decreased balance secondary to degenerative changes. Patient has attended a total of 5 scheduled physical therapy visits including initial evaluation on 2018. Treatment has consisted of balance and strengthening to improve overall mobility, stability, and performance with activities of daily living. PROBLEM LIST (Impacting functional limitations):  1. Decreased Strength  2. Decreased Balance  3. Decreased Activity Tolerance INTERVENTIONS PLANNED:  1. Balance Exercise  2. Gait Training  3. Home Exercise Program (HEP)  4. Neuromuscular Re-education/Strengthening  5. Therapeutic Exercise/Strengthening   TREATMENT PLAN:  Effective Dates: 2018 TO 2018 (90 days). Frequency/Duration: 2 times a week for 90 Days  GOALS: (Goals have been discussed and agreed upon with patient.)  Short-Term Functional Goals: Time Frame: 30 days  1. Patient will be independent with home exercise program without exacerbation of symptoms or cueing needed. Discharge Goals: Time Frame: 90 days  1.  Patient will be independent with all ADLs with minimal fear of falling and loss of balance with daily tasks. 2. Patient will report no fear avoidance with social or recreational activities due to fear of falling. 3. Patient will score greater than or equal to 39/56 on Joiner Balance Scale with minimal effect of imbalance on patient's ability to manage every day life activities. 4. Patient will score greater than or equal to 19/24 on Dynamic Gait Index with minimal effect of imbalance on patient's ability to manage every day life activities. Rehabilitation Potential For Stated Goals: Good            The information in this section was collected on 6/27/2018 (except where otherwise noted). HISTORY:   History of Present Injury/Illness (Reason for Referral):  Patient reports that he began having trouble with his feet/walking about 2 years ago. He reports that he has fallen in the past.  He reports that he has numbness in his feet and ankles bilaterally. He reports that he know that most of his trouble is his fear of falling or becoming off balance. He reports that he has to use his cane when walking in the community, but is able to walk with nothing while in his home. He reports that he would like to improve his overall mobility and safety with ambulation so that he doesn't fall and isn't afraid of falling. Past Medical History/Comorbidities:   Mr. Poppy Will  has a past medical history of Hypertension; Other ill-defined conditions(799.89); and Psychiatric disorder. He also has no past medical history of Arthritis; Asthma; Autoimmune disease ([address] CAD (coronary artery disease); Cancer ([address] Chronic kidney disease; COPD; DEMENTIA; Diabetes ([address] Endocrine disease; Gastrointestinal disorder; Heart failure ([address] Infectious disease; Liver disease; Neurological disorder; PUD (peptic ulcer disease); Seizures ([address] Sleep disorder; Stroke [facility]); or Thromboembolus ([address]   Mr. Poppy Will  has a past surgical history that includes hx orthopaedic. Social History/Living Environment:   Home Environment: Private residence  Living Alone: No  Support Systems: Family member(s), Friends \ neighbors  Prior Level of Function/Work/Activity:  Independent  Dominant Side:         RIGHT  Personal Factors:          Sex:  male        Age:  48 y.o. Current Medications:     Medications (PRN)    Date Last Reviewed:  7/24/2018    Number of Personal Factors/Comorbidities that affect the Plan of Care: 1-2: MODERATE COMPLEXITY   EXAMINATION:   Observation/Orthostatic Postural Assessment:          Posture Assessment: Rounded shoulders, Forward head, Wide stance with feet beyond shoulder width apart, B LE in external rotation   Functional Mobility:         Gait/Ambulation:     Base of Support: Center of gravity altered  Speed/Haleigh: Pace decreased (<100 feet/min)  Step Length: Left shortened, Right shortened  Swing Pattern: Left asymmetrical, Right asymmetrical  Stance: Left increased, Right increased  Gait Abnormalities: Antalgic - B LE in abduction and external rotation  Ambulation - Level of Assistance: Modified independent  Assistive Device: Cane, straight  · Interventions: Verbal cues, Safety awareness training          Transfers:     Sit to Stand: Modified independent  Stand to Sit: Modified independent  Stand Pivot Transfers: Modified independent  Bed to Chair: Modified independent  Lateral Transfers: Modified independent         Bed Mobility:     Rolling: Independent  Supine to Sit: Independent  Sit to Supine: Independent  Scooting: Independent   Strength:     LE STRENGTH:  4/5 hip flexion, 2/5 hip abduction, 2/5 hip adduction, 2/5 hip extension, 3/5 knee extension, 3/5 knee flexion, 2/5 ankle dorsiflexion, 3/5 ankle plantarflexion, 2/5 ankle inversion, 2/5 ankle eversion.   Sensation:         Intact to mid-shin/gastroc region - reports numbness/decreased sensation to toes  Postural Control & Balance:  · Joiner Balance Scale:  19/56.   (A score less than 45/56 indicates high risk of falls)     · Dynamic Gait Index:  7/24. (A score less than or equal to19/24 is abnormal and predictive of falls)        Body Structures Involved:  1. Nerves  2. Muscles Body Functions Affected:  1. Neuromusculoskeletal  2. Movement Related Activities and Participation Affected:  1. Mobility  2. Self Care   Number of elements (examined above) that affect the Plan of Care: 4+: HIGH COMPLEXITY   CLINICAL PRESENTATION:   Presentation: Evolving clinical presentation with changing clinical characteristics: MODERATE COMPLEXITY   CLINICAL DECISION MAKING:   Outcome Measure: Tool Used: Joiner Balance Scale  Score:  Initial: 19/56 Most Recent: X/56 (Date: -- )   Interpretation of Score: Each section is scored on a 0-4 scale, 0 representing the patients inability to perform the task and 4 representing independence. The scores of each section are added together for a total score of 56. The higher the patients score, the more independent the patient is. Any score below 45 indicates increased risk for falls. Score 56 55-45 44-34 33-23 22-12 11-1 0   Modifier CH CI CJ CK CL CM CN     Tool Used: Dynamic Gait Index  Score:  Initial: 7/24 Most Recent: X/24 (Date: -- )   Interpretation of Score: Each section is scored on a 0-3 scale, 0 representing the patients inability to perform the task and 3 representing independence. The scores of each section are added together for a total score of 24. Any score below 19 indicates increased risk for falls. Score 24 23-19 18-15 14-10 9-5 4-1 0   Modifier CH CI CJ CK CL CM CN     ?  Mobility - Walking and Moving Around:     - CURRENT STATUS: CL - 60%-79% impaired, limited or restricted    - GOAL STATUS: CJ - 20%-39% impaired, limited or restricted    - D/C STATUS:  ---------------To be determined---------------    Medical Necessity:   · Patient is expected to demonstrate progress in strength, range of motion, balance and coordination to improve safety during daily activities. · Patient demonstrates good rehab potential due to higher previous functional level. · Skilled intervention continues to be required due to decreased mobility. Reason for Services/Other Comments:  · Patient continues to demonstrate capacity to improve overall functional mobility which will increase independence and increase safety. Use of outcome tool(s) and clinical judgement create a POC that gives a: Questionable prediction of patient's progress: MODERATE COMPLEXITY            TREATMENT:   (In addition to Assessment/Re-Assessment sessions the following treatments were rendered)  Pre-treatment Symptoms/Complaints:  7/24/2018: Patient reports he feels like it is helping a little bit. \"I am not as anxious\". Pain: Initial: Pain Intensity 1: 0  Post Session:  0/10     NEUROMUSCULAR RE-EDUCATION: (45 minutes):  Exercise/activities per grid below to improve balance, coordination, kinesthetic sense, posture and proprioception. Required minimal visual, verbal and manual cues to promote static and dynamic balance in standing, promote coordination of bilateral, lower extremity(s) and promote motor control of bilateral, lower extremity(s).    Balance/Vestibular Treatment:   Activity   Date  7/24/18 Date  7/13/2018 Date  7/19/18   Activity/Exercise   Sets/reps/equipment Sets/reps/  equipment Sets/reps/  equipment   Walking with head turns     4 laps 4 laps 4 laps   Walking with head up & down          Step ups     6 inch  2x10  6 inch  2x10   Step taps     6 inch  2x10 6 inch  2x10    Marching   4 laps 4 laps 4 laps   Sidestepping   4 laps 4 laps 4 laps   Crossovers        Oakes        Walking  backwards     4 laps     Walking with narrow base of support   4 laps 4 laps    Weaving in/out of cones       4 laps   Picking up cones          Stepping over half foam     2x10     [employer] ball        Figure 8s         Circles right/left        Walking with 360 degree turns Spirals        Weight shifting:    Left & Right          Weight shifting: Forward & Backward           Static Standing Balance     Blue foam    Eyes open weight shifting lateral    Eyes closed  Static  3 reps  5 second holds Blue foam    Eyes open weight shifting lateral    Eyes closed  Static  3 reps  5 second holds Blue foam    Eyes open weight shifting lateral    Eyes closed  Static  3 reps  5 second holds   Standing with feet apart          Standing with feet together          Standing with feet semitandem   Blue foam    Eyes open weight shifting forward and backward    Eyes closed  Static  3 reps  5 second holds Blue foam    Eyes open weight shifting forward and backward    Eyes closed  Static  3 reps  5 second holds Blue foam    Eyes open weight shifting forward and backward    Eyes closed  Static  3 reps  5 second holds   Standing with feet tandem        Single leg stance          [facility]  Treatment/Session Assessment:    · Response to Treatment:  Patient tolerated exercises without issues. · Compliance with Program/Exercises: Will assess as treatment progresses. · Recommendations/Intent for next treatment session: \"Next visit will focus on advancements to more challenging activities\".   Total Treatment Duration:  PT Patient Time In/Time Out  Time In: 6825  Time Out: Fabian Spencer 8852

## 2018-07-26 ENCOUNTER — HOSPITAL ENCOUNTER (OUTPATIENT)
Dept: PHYSICAL THERAPY | Age: 53
Discharge: HOME | End: 2018-07-26
Attending: PSYCHIATRY & NEUROLOGY
Payer: MEDICARE

## 2018-07-26 PROCEDURE — 97112 NEUROMUSCULAR REEDUCATION: CPT

## 2018-07-26 NOTE — PROGRESS NOTES
Bishopbrock Fairchild  : 1965  Primary: Ze Gonsales Medicare Hmo  Secondary:  [address] at [address]  [address] [facility], [address] [place] [address],[address], [facility].  [phone]   [phone]          OUTPATIENT PHYSICAL THERAPY:Daily Note    ICD-10: Treatment Diagnosis:   · Other abnormalities of gait and mobility (R26.89)  · Difficulty in walking, not elsewhere classified (R26.2)  Precautions/Allergies:   Review of patient's allergies indicates no known allergies. Fall Risk Score: 2 (? 5 = High Risk)  MD Orders: Evaluate and Treat MEDICAL/REFERRING DIAGNOSIS:  Numbness and tingling of both feet [R20.0, R20.2]  Pain in both feet [M79.671, M79.672]  Neuropathy [G62.9]   DATE OF ONSET: Chronic  REFERRING PHYSICIAN: DO BETI West PHYSICIAN APPOINTMENT: TBD     ASSESSMENT:  Mr. Staci Tiwari presents with decreased mobility, decreased strength, decreased safety with ambulation, and decreased balance secondary to degenerative changes. Patient has attended a total of 6 scheduled physical therapy visits including initial evaluation on 2018. Treatment has consisted of balance and strengthening to improve overall mobility, stability, and performance with activities of daily living. PROBLEM LIST (Impacting functional limitations):  1. Decreased Strength  2. Decreased Balance  3. Decreased Activity Tolerance INTERVENTIONS PLANNED:  1. Balance Exercise  2. Gait Training  3. Home Exercise Program (HEP)  4. Neuromuscular Re-education/Strengthening  5. Therapeutic Exercise/Strengthening   TREATMENT PLAN:  Effective Dates: 2018 TO 2018 (90 days). Frequency/Duration: 2 times a week for 90 Days  GOALS: (Goals have been discussed and agreed upon with patient.)  Short-Term Functional Goals: Time Frame: 30 days  1. Patient will be independent with home exercise program without exacerbation of symptoms or cueing needed. Discharge Goals: Time Frame: 90 days  1.  Patient will be independent with all ADLs with minimal fear of falling and loss of balance with daily tasks. 2. Patient will report no fear avoidance with social or recreational activities due to fear of falling. 3. Patient will score greater than or equal to 39/56 on Joiner Balance Scale with minimal effect of imbalance on patient's ability to manage every day life activities. 4. Patient will score greater than or equal to 19/24 on Dynamic Gait Index with minimal effect of imbalance on patient's ability to manage every day life activities. Rehabilitation Potential For Stated Goals: Good            The information in this section was collected on 6/27/2018 (except where otherwise noted). HISTORY:   History of Present Injury/Illness (Reason for Referral):  Patient reports that he began having trouble with his feet/walking about 2 years ago. He reports that he has fallen in the past.  He reports that he has numbness in his feet and ankles bilaterally. He reports that he know that most of his trouble is his fear of falling or becoming off balance. He reports that he has to use his cane when walking in the community, but is able to walk with nothing while in his home. He reports that he would like to improve his overall mobility and safety with ambulation so that he doesn't fall and isn't afraid of falling. Past Medical History/Comorbidities:   Mr. Tyree Pringle  has a past medical history of Hypertension; Other ill-defined conditions(799.89); and Psychiatric disorder. He also has no past medical history of Arthritis; Asthma; Autoimmune disease ([address] CAD (coronary artery disease); Cancer ([address] Chronic kidney disease; COPD; DEMENTIA; Diabetes ([address] Endocrine disease; Gastrointestinal disorder; Heart failure ([address] Infectious disease; Liver disease; Neurological disorder; PUD (peptic ulcer disease); Seizures ([address] Sleep disorder; Stroke [facility]); or Thromboembolus ([address]   Mr. Tyree Pringle  has a past surgical history that includes hx orthopaedic. Social History/Living Environment:   Home Environment: Private residence  Living Alone: No  Support Systems: Family member(s), Friends \ neighbors  Prior Level of Function/Work/Activity:  Independent  Dominant Side:         RIGHT  Personal Factors:          Sex:  male        Age:  48 y.o. Current Medications:     Medications (PRN)    Date Last Reviewed:  7/26/2018    Number of Personal Factors/Comorbidities that affect the Plan of Care: 1-2: MODERATE COMPLEXITY   EXAMINATION:   Observation/Orthostatic Postural Assessment:          Posture Assessment: Rounded shoulders, Forward head, Wide stance with feet beyond shoulder width apart, B LE in external rotation   Functional Mobility:         Gait/Ambulation:     Base of Support: Center of gravity altered  Speed/Haleigh: Pace decreased (<100 feet/min)  Step Length: Left shortened, Right shortened  Swing Pattern: Left asymmetrical, Right asymmetrical  Stance: Left increased, Right increased  Gait Abnormalities: Antalgic - B LE in abduction and external rotation  Ambulation - Level of Assistance: Modified independent  Assistive Device: Cane, straight  · Interventions: Verbal cues, Safety awareness training          Transfers:     Sit to Stand: Modified independent  Stand to Sit: Modified independent  Stand Pivot Transfers: Modified independent  Bed to Chair: Modified independent  Lateral Transfers: Modified independent         Bed Mobility:     Rolling: Independent  Supine to Sit: Independent  Sit to Supine: Independent  Scooting: Independent   Strength:     LE STRENGTH:  4/5 hip flexion, 2/5 hip abduction, 2/5 hip adduction, 2/5 hip extension, 3/5 knee extension, 3/5 knee flexion, 2/5 ankle dorsiflexion, 3/5 ankle plantarflexion, 2/5 ankle inversion, 2/5 ankle eversion.   Sensation:         Intact to mid-shin/gastroc region - reports numbness/decreased sensation to toes  Postural Control & Balance:  · Joiner Balance Scale:  19/56.   (A score less than 45/56 indicates high risk of falls)     · Dynamic Gait Index:  7/24. (A score less than or equal to19/24 is abnormal and predictive of falls)        Body Structures Involved:  1. Nerves  2. Muscles Body Functions Affected:  1. Neuromusculoskeletal  2. Movement Related Activities and Participation Affected:  1. Mobility  2. Self Care   Number of elements (examined above) that affect the Plan of Care: 4+: HIGH COMPLEXITY   CLINICAL PRESENTATION:   Presentation: Evolving clinical presentation with changing clinical characteristics: MODERATE COMPLEXITY   CLINICAL DECISION MAKING:   Outcome Measure: Tool Used: Joiner Balance Scale  Score:  Initial: 19/56 Most Recent: X/56 (Date: -- )   Interpretation of Score: Each section is scored on a 0-4 scale, 0 representing the patients inability to perform the task and 4 representing independence. The scores of each section are added together for a total score of 56. The higher the patients score, the more independent the patient is. Any score below 45 indicates increased risk for falls. Score 56 55-45 44-34 33-23 22-12 11-1 0   Modifier CH CI CJ CK CL CM CN     Tool Used: Dynamic Gait Index  Score:  Initial: 7/24 Most Recent: X/24 (Date: -- )   Interpretation of Score: Each section is scored on a 0-3 scale, 0 representing the patients inability to perform the task and 3 representing independence. The scores of each section are added together for a total score of 24. Any score below 19 indicates increased risk for falls. Score 24 23-19 18-15 14-10 9-5 4-1 0   Modifier CH CI CJ CK CL CM CN     ?  Mobility - Walking and Moving Around:     - CURRENT STATUS: CL - 60%-79% impaired, limited or restricted    - GOAL STATUS: CJ - 20%-39% impaired, limited or restricted    - D/C STATUS:  ---------------To be determined---------------    Medical Necessity:   · Patient is expected to demonstrate progress in strength, range of motion, balance and coordination to improve safety during daily activities. · Patient demonstrates good rehab potential due to higher previous functional level. · Skilled intervention continues to be required due to decreased mobility. Reason for Services/Other Comments:  · Patient continues to demonstrate capacity to improve overall functional mobility which will increase independence and increase safety. Use of outcome tool(s) and clinical judgement create a POC that gives a: Questionable prediction of patient's progress: MODERATE COMPLEXITY            TREATMENT:   (In addition to Assessment/Re-Assessment sessions the following treatments were rendered)  Pre-treatment Symptoms/Complaints:  7/26/2018: Patient reports he is having a harder time moving today. Pain: Initial: Pain Intensity 1: 0  Post Session:  0/10     NEUROMUSCULAR RE-EDUCATION: (45 minutes):  Exercise/activities per grid below to improve balance, coordination, kinesthetic sense, posture and proprioception. Required minimal visual, verbal and manual cues to promote static and dynamic balance in standing, promote coordination of bilateral, lower extremity(s) and promote motor control of bilateral, lower extremity(s).    Balance/Vestibular Treatment:   Activity   Date  7/24/18 Date  7/26/2018 Date  7/19/18   Activity/Exercise   Sets/reps/equipment Sets/reps/  equipment Sets/reps/  equipment   Walking with head turns     4 laps 4 laps 4 laps   Walking with head up & down          Step ups     6 inch  2x10 6 inch  2x10 6 inch  2x10   Step taps     6 inch  2x10 6 inch  2x10    Marching   4 laps 4 laps 4 laps   Sidestepping   4 laps 4 laps 4 laps   Crossovers        Cable        Walking  backwards     4 laps     Walking with narrow base of support   4 laps 4 laps    Weaving in/out of cones     4 laps  laps 4 laps   Picking up cones          Stepping over half foam     2x10 2x10    Ball toss        Kick ball        Figure 8s         Circles right/left        Walking with 360 degree turns Spirals        Weight shifting:    Left & Right          Weight shifting: Forward & Backward           Static Standing Balance     Blue foam    Eyes open weight shifting lateral    Eyes closed  Static  3 reps  5 second holds Blue foam    Eyes open weight shifting lateral    Eyes closed  Static  3 reps  5 second holds Blue foam    Eyes open weight shifting lateral    Eyes closed  Static  3 reps  5 second holds   Standing with feet apart          Standing with feet together          Standing with feet semitandem   Blue foam    Eyes open weight shifting forward and backward    Eyes closed  Static  3 reps  5 second holds Blue foam    Eyes open weight shifting forward and backward    Eyes closed  Static  3 reps  5 second holds Blue foam    Eyes open weight shifting forward and backward    Eyes closed  Static  3 reps  5 second holds   Standing with feet tandem        Single leg stance          [facility]  Treatment/Session Assessment:    · Response to Treatment:  Patient had more anxiety during treatment today. Continue to progress to tolerance. · Compliance with Program/Exercises: Will assess as treatment progresses. · Recommendations/Intent for next treatment session: \"Next visit will focus on advancements to more challenging activities\".   Total Treatment Duration:  PT Patient Time In/Time Out  Time In: 1345  Time Out: [address]

## 2018-07-31 ENCOUNTER — HOSPITAL ENCOUNTER (OUTPATIENT)
Dept: PHYSICAL THERAPY | Age: 53
Discharge: HOME | End: 2018-07-31
Attending: PSYCHIATRY & NEUROLOGY
Payer: MEDICARE

## 2018-07-31 NOTE — PROGRESS NOTES
[facility] at [address], [address] [place] [address], [address]  Phone: [phone]   Fax: [phone]    OUTPATIENT DAILY NOTE    NAME/AGE/GENDER: Bishop Fairchild is a 48 y.o. male. DATE: 7/31/2018    Patient cancelled his appointment for today due to unknown reasons. Will plan to follow up on next scheduled visit.     Mary Kay Archuleta, PT

## 2018-08-02 ENCOUNTER — HOSPITAL ENCOUNTER (OUTPATIENT)
Dept: PHYSICAL THERAPY | Age: 53
Discharge: HOME | End: 2018-08-02
Attending: PSYCHIATRY & NEUROLOGY
Payer: MEDICARE

## 2018-08-02 PROCEDURE — G8978 MOBILITY CURRENT STATUS: HCPCS

## 2018-08-02 PROCEDURE — 97112 NEUROMUSCULAR REEDUCATION: CPT

## 2018-08-02 PROCEDURE — G8979 MOBILITY GOAL STATUS: HCPCS

## 2018-08-02 NOTE — PROGRESS NOTES
Linsey Guevara  : 1965  Primary: Micheal Gonsales [employer] Hmo  Secondary:  [address] at [place] [address] [facility], [address] [place] [address],[address], [place] [address].  [phone]   [phone]          OUTPATIENT PHYSICAL THERAPY:Progress Report    ICD-10: Treatment Diagnosis:   · Other abnormalities of gait and mobility (R26.89)  · Difficulty in walking, not elsewhere classified (R26.2)  Precautions/Allergies:   Review of patient's allergies indicates no known allergies. Fall Risk Score: 2 (? 5 = High Risk)  MD Orders: Evaluate and Treat MEDICAL/REFERRING DIAGNOSIS:  Numbness and tingling of both feet [R20.0, R20.2]  Pain in both feet [M79.671, M79.672]  Neuropathy [G62.9]   DATE OF ONSET: Chronic  REFERRING PHYSICIAN: DO BETI Norton PHYSICIAN APPOINTMENT: TBD     ASSESSMENT:  Mr. Derrick Jenkins presents with decreased mobility, decreased strength, decreased safety with ambulation, and decreased balance secondary to degenerative changes. Patient has attended a total of 7 scheduled physical therapy visits including initial evaluation on 2018. Treatment has consisted of balance and strengthening to improve overall mobility, stability, and performance with activities of daily living. PROBLEM LIST (Impacting functional limitations):  1. Decreased Strength  2. Decreased Balance  3. Decreased Activity Tolerance INTERVENTIONS PLANNED:  1. Balance Exercise  2. Gait Training  3. Home Exercise Program (HEP)  4. Neuromuscular Re-education/Strengthening  5. Therapeutic Exercise/Strengthening   TREATMENT PLAN:  Effective Dates: 2018 TO 2018 (90 days). Frequency/Duration: 2 times a week for 90 Days  GOALS: (Goals have been discussed and agreed upon with patient.)  Short-Term Functional Goals: Time Frame: 30 days  1. Patient will be independent with home exercise program without exacerbation of symptoms or cueing needed--goal met.   Discharge Goals: Time Frame: 90 days  1. Patient will be independent with all ADLs with minimal fear of falling and loss of balance with daily tasks--goal ongoing. 2. Patient will report no fear avoidance with social or recreational activities due to fear of falling--goal ongoing. 3. Patient will score greater than or equal to 39/56 on Joiner Balance Scale with minimal effect of imbalance on patient's ability to manage every day life activities--goal ongoing. 4. Patient will score greater than or equal to 19/24 on Dynamic Gait Index with minimal effect of imbalance on patient's ability to manage every day life activities--goal ongoing. Rehabilitation Potential For Stated Goals: Good            The information in this section was collected on 6/27/2018 (except where otherwise noted). HISTORY:   History of Present Injury/Illness (Reason for Referral):  Patient reports that he began having trouble with his feet/walking about 2 years ago. He reports that he has fallen in the past.  He reports that he has numbness in his feet and ankles bilaterally. He reports that he know that most of his trouble is his fear of falling or becoming off balance. He reports that he has to use his cane when walking in the community, but is able to walk with nothing while in his home. He reports that he would like to improve his overall mobility and safety with ambulation so that he doesn't fall and isn't afraid of falling. Past Medical History/Comorbidities:   Mr. Seb Garcia  has a past medical history of Hypertension; Other ill-defined conditions(799.89); and Psychiatric disorder. He also has no past medical history of Arthritis; Asthma; Autoimmune disease ([address] CAD (coronary artery disease); Cancer ([address] Chronic kidney disease; COPD; DEMENTIA; Diabetes ([address] Endocrine disease; Gastrointestinal disorder; Heart failure ([address] Infectious disease; Liver disease; Neurological disorder; PUD (peptic ulcer disease); Seizures ([facility] [address] [address]  Sleep disorder; Stroke [facility]); or Thromboembolus ([address] Mr. Fili Vargas  has a past surgical history that includes hx orthopaedic. Social History/Living Environment:   Home Environment: Private residence  Living Alone: No  Support Systems: Family member(s), Friends \ neighbors  Prior Level of Function/Work/Activity:  Independent  Dominant Side:         RIGHT  Personal Factors:          Sex:  male        Age:  48 y.o. Current Medications:     Medications (PRN)    Date Last Reviewed:  8/2/2018    Number of Personal Factors/Comorbidities that affect the Plan of Care: 1-2: MODERATE COMPLEXITY   EXAMINATION:   Observation/Orthostatic Postural Assessment:          Posture Assessment: Rounded shoulders, Forward head, Wide stance with feet beyond shoulder width apart, B LE in external rotation   Functional Mobility:         Gait/Ambulation:     Base of Support: Center of gravity altered  Speed/Haleigh: Pace decreased (<100 feet/min)  Step Length: Left shortened, Right shortened  Swing Pattern: Left asymmetrical, Right asymmetrical  Stance: Left increased, Right increased  Gait Abnormalities: Antalgic - B LE in abduction and external rotation  Ambulation - Level of Assistance: Modified independent  Assistive Device: Cane, straight  · Interventions: Verbal cues, Safety awareness training          Transfers:     Sit to Stand: Modified independent  Stand to Sit: Modified independent  Stand Pivot Transfers: Modified independent  Bed to Chair: Modified independent  Lateral Transfers: Modified independent         Bed Mobility:     Rolling: Independent  Supine to Sit: Independent  Sit to Supine: Independent  Scooting: Independent   Strength:     LE STRENGTH:  4/5 hip flexion, 2/5 hip abduction, 2/5 hip adduction, 2/5 hip extension, 3/5 knee extension, 3/5 knee flexion, 2/5 ankle dorsiflexion, 3/5 ankle plantarflexion, 2/5 ankle inversion, 2/5 ankle eversion.   Sensation:         Intact to mid-shin/gastroc region - reports numbness/decreased sensation to toes  Postural Control & Balance:  · Joiner Balance Scale:  19/56.   (A score less than 45/56 indicates high risk of falls)     · Dynamic Gait Index:  7/24. (A score less than or equal to19/24 is abnormal and predictive of falls)        Body Structures Involved:  1. Nerves  2. Muscles Body Functions Affected:  1. Neuromusculoskeletal  2. Movement Related Activities and Participation Affected:  1. Mobility  2. Self Care   Number of elements (examined above) that affect the Plan of Care: 4+: HIGH COMPLEXITY   CLINICAL PRESENTATION:   Presentation: Evolving clinical presentation with changing clinical characteristics: MODERATE COMPLEXITY   CLINICAL DECISION MAKING:   Outcome Measure: Tool Used: Joiner Balance Scale  Score:  Initial: 19/56 Most Recent: 19/56 (Date: 8/2/2018 )   Interpretation of Score: Each section is scored on a 0-4 scale, 0 representing the patients inability to perform the task and 4 representing independence. The scores of each section are added together for a total score of 56. The higher the patients score, the more independent the patient is. Any score below 45 indicates increased risk for falls. Score 56 55-45 44-34 33-23 22-12 11-1 0   Modifier CH CI CJ CK CL CM CN     Tool Used: Dynamic Gait Index  Score:  Initial: 7/24 Most Recent: 7/24 (Date: 8/2/2018 )   Interpretation of Score: Each section is scored on a 0-3 scale, 0 representing the patients inability to perform the task and 3 representing independence. The scores of each section are added together for a total score of 24. Any score below 19 indicates increased risk for falls. Score 24 23-19 18-15 14-10 9-5 4-1 0   Modifier CH CI CJ CK CL CM CN     ?  Mobility - Walking and Moving Around:     - CURRENT STATUS: CL - 60%-79% impaired, limited or restricted    - GOAL STATUS: CJ - 20%-39% impaired, limited or restricted    - D/C STATUS:  ---------------To be determined---------------    Medical Necessity: · Patient is expected to demonstrate progress in strength, range of motion, balance and coordination to improve safety during daily activities. · Patient demonstrates good rehab potential due to higher previous functional level. · Skilled intervention continues to be required due to decreased mobility. Reason for Services/Other Comments:  · Patient continues to demonstrate capacity to improve overall functional mobility which will increase independence and increase safety. Use of outcome tool(s) and clinical judgement create a POC that gives a: Questionable prediction of patient's progress: MODERATE COMPLEXITY            TREATMENT:   (In addition to Assessment/Re-Assessment sessions the following treatments were rendered)  Pre-treatment Symptoms/Complaints:  8/2/2018: Patient reports he was unable to walk across the parking lot because wide open spaces increases his anxiety so he had to wait for a closer spot before he would get out of his car. Patient was 15 minutes late. Pain: Initial: Pain Intensity 1: 0  Post Session:  0/10     NEUROMUSCULAR RE-EDUCATION: (30 minutes):  Exercise/activities per grid below to improve balance, coordination, kinesthetic sense, posture and proprioception. Required minimal visual, verbal and manual cues to promote static and dynamic balance in standing, promote coordination of bilateral, lower extremity(s) and promote motor control of bilateral, lower extremity(s).    Balance/Vestibular Treatment:   Activity   Date  7/24/18 Date  7/26/2018 Date  7/19/18 Date:  8/2/2018   Activity/Exercise   Sets/reps/equipment Sets/reps/  equipment Sets/reps/  equipment Sets/reps/  equipment   Walking with head turns     4 laps 4 laps 4 laps 4 laps   Walking with head up & down           Step ups     6 inch  2x10 6 inch  2x10 6 inch  2x10 6 inch  2x10   Step taps     6 inch  2x10 6 inch  2x10  6 inch  2x10   Marching   4 laps 4 laps 4 laps 4 laps   Sidestepping   4 laps 4 laps 4 laps 4 laps Crossovers         [place]         Walking  backwards     4 laps      Walking with narrow base of support   4 laps 4 laps  4 laps   Weaving in/out of cones     4 laps  laps 4 laps    Picking up cones           Stepping over half foam     2x10 2x10  2x10   Ball toss         Kick ball         Figure 8s          Circles right/left         Walking with 360 degree turns         Spirals         Weight shifting:    Left & Right           Weight shifting: Forward & Backward            Static Standing Balance     Blue foam    Eyes open weight shifting lateral    Eyes closed  Static  3 reps  5 second holds Blue foam    Eyes open weight shifting lateral    Eyes closed  Static  3 reps  5 second holds Blue foam    Eyes open weight shifting lateral    Eyes closed  Static  3 reps  5 second holds Blue foam    Eyes open weight shifting lateral    Eyes closed  Static  3 reps  5 second holds   Standing with feet apart           Standing with feet together           Standing with feet semitandem   Blue foam    Eyes open weight shifting forward and backward    Eyes closed  Static  3 reps  5 second holds Blue foam    Eyes open weight shifting forward and backward    Eyes closed  Static  3 reps  5 second holds Blue foam    Eyes open weight shifting forward and backward    Eyes closed  Static  3 reps  5 second holds    Standing with feet tandem         Single leg stance           [facility]  Treatment/Session Assessment:    · Response to Treatment:  Patient completed all activities. He is able to state he has anxiety but does not seem to realize the connection between that and his unwillingness to walk in open spaces despite his ability to physically perform the activity. · Compliance with Program/Exercises: Will assess as treatment progresses. · Recommendations/Intent for next treatment session: \"Next visit will focus on advancements to more challenging activities\".   Total Treatment Duration:  PT Patient Time In/Time Out  Time In: 1400  Time Out: 300 Med Tech Keena, PT

## 2018-08-23 ENCOUNTER — HOSPITAL ENCOUNTER (OUTPATIENT)
Dept: PHYSICAL THERAPY | Age: 53
Discharge: HOME | End: 2018-08-23
Attending: PSYCHIATRY & NEUROLOGY
Payer: MEDICARE

## 2018-08-23 PROCEDURE — 97112 NEUROMUSCULAR REEDUCATION: CPT

## 2018-08-23 NOTE — PROGRESS NOTES
Javon Pyle  : 1965  Primary: Cornell Gonsales Medicare Hmo  Secondary:  [address] at [place] [id number] [facility], [address] [place] [address],[address], [address] [facility]  [phone]   [phone]          OUTPATIENT PHYSICAL THERAPY:Daily Note    ICD-10: Treatment Diagnosis:   · Other abnormalities of gait and mobility (R26.89)  · Difficulty in walking, not elsewhere classified (R26.2)  Precautions/Allergies:   Review of patient's allergies indicates no known allergies. Fall Risk Score: 2 (? 5 = High Risk)  MD Orders: Evaluate and Treat MEDICAL/REFERRING DIAGNOSIS:  Numbness and tingling of both feet [R20.0, R20.2]  Pain in both feet [M79.671, M79.672]  Neuropathy [G62.9]   DATE OF ONSET: Chronic  REFERRING PHYSICIAN: DO BETI Harley PHYSICIAN APPOINTMENT: TBD     ASSESSMENT:  Mr. Al Lopez presents with decreased mobility, decreased strength, decreased safety with ambulation, and decreased balance secondary to degenerative changes. Patient has attended a total of 8 scheduled physical therapy visits including initial evaluation on 2018. Treatment has consisted of balance and strengthening to improve overall mobility, stability, and performance with activities of daily living. PROBLEM LIST (Impacting functional limitations):  1. Decreased Strength  2. Decreased Balance  3. Decreased Activity Tolerance INTERVENTIONS PLANNED:  1. Balance Exercise  2. Gait Training  3. Home Exercise Program (HEP)  4. Neuromuscular Re-education/Strengthening  5. Therapeutic Exercise/Strengthening   TREATMENT PLAN:  Effective Dates: 2018 TO 2018 (90 days). Frequency/Duration: 2 times a week for 90 Days  GOALS: (Goals have been discussed and agreed upon with patient.)  Short-Term Functional Goals: Time Frame: 30 days  1. Patient will be independent with home exercise program without exacerbation of symptoms or cueing needed--goal met. Discharge Goals: Time Frame: 90 days  1.  Patient will be independent with all ADLs with minimal fear of falling and loss of balance with daily tasks--goal ongoing. 2. Patient will report no fear avoidance with social or recreational activities due to fear of falling--goal ongoing. 3. Patient will score greater than or equal to 39/56 on Joiner Balance Scale with minimal effect of imbalance on patient's ability to manage every day life activities--goal ongoing. 4. Patient will score greater than or equal to 19/24 on Dynamic Gait Index with minimal effect of imbalance on patient's ability to manage every day life activities--goal ongoing. Rehabilitation Potential For Stated Goals: Good            The information in this section was collected on 6/27/2018 (except where otherwise noted). HISTORY:   History of Present Injury/Illness (Reason for Referral):  Patient reports that he began having trouble with his feet/walking about 2 years ago. He reports that he has fallen in the past.  He reports that he has numbness in his feet and ankles bilaterally. He reports that he know that most of his trouble is his fear of falling or becoming off balance. He reports that he has to use his cane when walking in the community, but is able to walk with nothing while in his home. He reports that he would like to improve his overall mobility and safety with ambulation so that he doesn't fall and isn't afraid of falling. Past Medical History/Comorbidities:   Mr. Randy Crawley  has a past medical history of Hypertension; Other ill-defined conditions(799.89); and Psychiatric disorder. He also has no past medical history of Arthritis; Asthma; Autoimmune disease ([address] CAD (coronary artery disease); Cancer ([address] Chronic kidney disease; COPD; DEMENTIA; Diabetes ([address] Endocrine disease; Gastrointestinal disorder; Heart failure ([address] Infectious disease; Liver disease; Neurological disorder; PUD (peptic ulcer disease); Seizures ([address] Sleep disorder; Stroke [facility]); or Thromboembolus ([facility] [address] Mr. Rosanna Joy  has a past surgical history that includes hx orthopaedic. Social History/Living Environment:   Home Environment: Private residence  Living Alone: No  Support Systems: Family member(s), Friends \ neighbors  Prior Level of Function/Work/Activity:  Independent  Dominant Side:         RIGHT  Personal Factors:          Sex:  male        Age:  48 y.o. Current Medications:     Medications (PRN)    Date Last Reviewed:  8/23/2018    Number of Personal Factors/Comorbidities that affect the Plan of Care: 1-2: MODERATE COMPLEXITY   EXAMINATION:   Observation/Orthostatic Postural Assessment:          Posture Assessment: Rounded shoulders, Forward head, Wide stance with feet beyond shoulder width apart, B LE in external rotation   Functional Mobility:         Gait/Ambulation:     Base of Support: Center of gravity altered  Speed/Haleigh: Pace decreased (<100 feet/min)  Step Length: Left shortened, Right shortened  Swing Pattern: Left asymmetrical, Right asymmetrical  Stance: Left increased, Right increased  Gait Abnormalities: Antalgic - B LE in abduction and external rotation  Ambulation - Level of Assistance: Modified independent  Assistive Device: Cane, straight  · Interventions: Verbal cues, Safety awareness training          Transfers:     Sit to Stand: Modified independent  Stand to Sit: Modified independent  Stand Pivot Transfers: Modified independent  Bed to Chair: Modified independent  Lateral Transfers: Modified independent         Bed Mobility:     Rolling: Independent  Supine to Sit: Independent  Sit to Supine: Independent  Scooting: Independent   Strength:     LE STRENGTH:  4/5 hip flexion, 2/5 hip abduction, 2/5 hip adduction, 2/5 hip extension, 3/5 knee extension, 3/5 knee flexion, 2/5 ankle dorsiflexion, 3/5 ankle plantarflexion, 2/5 ankle inversion, 2/5 ankle eversion.   Sensation:         Intact to mid-shin/gastroc region - reports numbness/decreased sensation to toes  Postural Control & Balance:  · Joiner Balance Scale:  19/56.   (A score less than 45/56 indicates high risk of falls)     · Dynamic Gait Index:  7/24. (A score less than or equal to19/24 is abnormal and predictive of falls)        Body Structures Involved:  1. Nerves  2. Muscles Body Functions Affected:  1. Neuromusculoskeletal  2. Movement Related Activities and Participation Affected:  1. Mobility  2. Self Care   Number of elements (examined above) that affect the Plan of Care: 4+: HIGH COMPLEXITY   CLINICAL PRESENTATION:   Presentation: Evolving clinical presentation with changing clinical characteristics: MODERATE COMPLEXITY   CLINICAL DECISION MAKING:   Outcome Measure: Tool Used: Joiner Balance Scale  Score:  Initial: 19/56 Most Recent: 19/56 (Date: 8/2/2018 )   Interpretation of Score: Each section is scored on a 0-4 scale, 0 representing the patients inability to perform the task and 4 representing independence. The scores of each section are added together for a total score of 56. The higher the patients score, the more independent the patient is. Any score below 45 indicates increased risk for falls. Score 56 55-45 44-34 33-23 22-12 11-1 0   Modifier CH CI CJ CK CL CM CN     Tool Used: Dynamic Gait Index  Score:  Initial: 7/24 Most Recent: 7/24 (Date: 8/2/2018 )   Interpretation of Score: Each section is scored on a 0-3 scale, 0 representing the patients inability to perform the task and 3 representing independence. The scores of each section are added together for a total score of 24. Any score below 19 indicates increased risk for falls. Score 24 23-19 18-15 14-10 9-5 4-1 0   Modifier CH CI CJ CK CL CM CN     ?  Mobility - Walking and Moving Around:     - CURRENT STATUS: CL - 60%-79% impaired, limited or restricted    - GOAL STATUS: CJ - 20%-39% impaired, limited or restricted    - D/C STATUS:  ---------------To be determined---------------    Medical Necessity:   · Patient is expected to demonstrate progress in strength, range of motion, balance and coordination to improve safety during daily activities. · Patient demonstrates good rehab potential due to higher previous functional level. · Skilled intervention continues to be required due to decreased mobility. Reason for Services/Other Comments:  · Patient continues to demonstrate capacity to improve overall functional mobility which will increase independence and increase safety. Use of outcome tool(s) and clinical judgement create a POC that gives a: Questionable prediction of patient's progress: MODERATE COMPLEXITY            TREATMENT:   (In addition to Assessment/Re-Assessment sessions the following treatments were rendered)  Pre-treatment Symptoms/Complaints:  8/23/2018: Patient reports he has been given an order for a rollator walker. He reports he is trying to get in for a [facility] appointment. Pain: Initial: Pain Intensity 1: 0  Post Session:  0/10     NEUROMUSCULAR RE-EDUCATION: (45 minutes):  Exercise/activities per grid below to improve balance, coordination, kinesthetic sense, posture and proprioception. Required minimal visual, verbal and manual cues to promote static and dynamic balance in standing, promote coordination of bilateral, lower extremity(s) and promote motor control of bilateral, lower extremity(s).    Balance/Vestibular Treatment:   Activity   Date  7/26/2018 Date  8/2/2018 Date:  8/23/2018   Activity/Exercise   Sets/reps/equipment Sets/reps/  equipment Sets/reps/  equipment   Walking with head turns     4 laps 4 laps 4 laps   Walking with head up & down          Step ups     6 inch  2x10 6 inch  2x10 6 inch  2x10   Step taps     6 inch  2x10 6 inch  2x10 6 inch  2x10   Marching   4 laps 4 laps 4 laps   Sidestepping   4 laps 4 laps 4 laps   Crossovers        [place]        Walking  backwards     4 laps 4 laps 4 laps   Walking with narrow base of support   4 laps 4 laps 4 laps   Weaving in/out of cones     4 laps 4 laps 4 laps   Picking up cones          Stepping over half foam     2x10 2x10 2x10   Ball toss        Kick ball        Figure 8s         Circles right/left        Walking with 360 degree turns        Spirals        Weight shifting:    Left & Right          Weight shifting: Forward & Backward           Static Standing Balance     Blue foam    Eyes open weight shifting lateral    Eyes closed  Static  3 reps  5 second holds Blue foam    Eyes open weight shifting lateral    Eyes closed  Static  3 reps  5 second holds Blue foam    Eyes open weight shifting lateral    Eyes closed  Static  3 reps  5 second holds   Standing with feet apart          Standing with feet together          Standing with feet semitandem   Blue foam    Eyes open weight shifting forward and backward    Eyes closed  Static  3 reps  5 second holds Blue foam    Eyes open weight shifting forward and backward    Eyes closed  Static  3 reps  5 second holds Blue foam    Eyes open weightshifting forward and backward    Eyes closed  Static  3 reps  5 second holds   Standing with feet tandem        Single leg stance          [facility]  Treatment/Session Assessment:    · Response to Treatment:  Patient completed all activities. Continues to demonstrate minimal progress with balance and coordination. · Compliance with Program/Exercises: Will assess as treatment progresses. · Recommendations/Intent for next treatment session: \"Next visit will focus on advancements to more challenging activities\".   Total Treatment Duration:  PT Patient Time In/Time Out  Time In: 1430  Time Out: 300 [facility] Keena, PT

## 2018-08-27 NOTE — PROGRESS NOTES
Therapy Center at [address], [facility] [address]  Phone: [phone]   Fax: [phone]    OUTPATIENT DAILY NOTE    NAME/AGE/GENDER: Karlo Marques is a 48 y.o. male. DATE: 8/28/2018    Patient cancelled his appointment for today due to unknown reasons. Will plan to follow up on next scheduled visit.     Kimo Mcgowan, PT

## 2018-08-28 ENCOUNTER — HOSPITAL ENCOUNTER (OUTPATIENT)
Dept: PHYSICAL THERAPY | Age: 53
Discharge: HOME | End: 2018-08-28
Attending: PSYCHIATRY & NEUROLOGY
Payer: MEDICARE

## 2018-09-04 ENCOUNTER — HOSPITAL ENCOUNTER (EMERGENCY)
Age: 53
Discharge: HOME | End: 2018-09-04
Attending: EMERGENCY MEDICINE
Payer: MEDICARE

## 2018-09-04 ENCOUNTER — HOSPITAL ENCOUNTER (OUTPATIENT)
Dept: PHYSICAL THERAPY | Age: 53
Discharge: HOME | End: 2018-09-04
Attending: PSYCHIATRY & NEUROLOGY

## 2018-09-04 VITALS
HEIGHT: 77 IN | BODY MASS INDEX: 24.79 KG/M2 | DIASTOLIC BLOOD PRESSURE: 82 MMHG | OXYGEN SATURATION: 96 % | RESPIRATION RATE: 18 BRPM | TEMPERATURE: 99.1 F | HEART RATE: 79 BPM | WEIGHT: 210 LBS | SYSTOLIC BLOOD PRESSURE: 134 MMHG

## 2018-09-04 DIAGNOSIS — F41.1 ANXIETY STATE: ICD-10-CM

## 2018-09-04 DIAGNOSIS — F10.920 ALCOHOLIC INTOXICATION WITHOUT COMPLICATION (HCC): Primary | ICD-10-CM

## 2018-09-04 LAB
ALBUMIN SERPL-MCNC: 3.8 G/DL (ref 3.5–5)
ALBUMIN/GLOB SERPL: 0.9 {RATIO} (ref 1.2–3.5)
ALP SERPL-CCNC: 80 U/L (ref 50–136)
ALT SERPL-CCNC: 67 U/L (ref 12–65)
ANION GAP SERPL CALC-SCNC: 14 MMOL/L (ref 7–16)
AST SERPL-CCNC: 97 U/L (ref 15–37)
BASOPHILS # BLD: 0 K/UL (ref 0–0.2)
BASOPHILS NFR BLD: 1 % (ref 0–2)
BILIRUB SERPL-MCNC: 2 MG/DL (ref 0.2–1.1)
BUN SERPL-MCNC: 7 MG/DL (ref 6–23)
CALCIUM SERPL-MCNC: 9.2 MG/DL (ref 8.3–10.4)
CHLORIDE SERPL-SCNC: 99 MMOL/L (ref 98–107)
CO2 SERPL-SCNC: 23 MMOL/L (ref 21–32)
CREAT SERPL-MCNC: 0.73 MG/DL (ref 0.8–1.5)
DIFFERENTIAL METHOD BLD: ABNORMAL
EOSINOPHIL # BLD: 0.1 K/UL (ref 0–0.8)
EOSINOPHIL NFR BLD: 1 % (ref 0.5–7.8)
ERYTHROCYTE [DISTWIDTH] IN BLOOD BY AUTOMATED COUNT: 12.1 %
ETHANOL SERPL-MCNC: 102 MG/DL
GLOBULIN SER CALC-MCNC: 4.1 G/DL (ref 2.3–3.5)
GLUCOSE SERPL-MCNC: 98 MG/DL (ref 65–100)
HCT VFR BLD AUTO: 44.3 % (ref 41.1–50.3)
HGB BLD-MCNC: 15.5 G/DL (ref 13.6–17.2)
IMM GRANULOCYTES # BLD: 0 K/UL (ref 0–0.5)
IMM GRANULOCYTES NFR BLD AUTO: 0 % (ref 0–5)
LYMPHOCYTES # BLD: 1.2 K/UL (ref 0.5–4.6)
LYMPHOCYTES NFR BLD: 16 % (ref 13–44)
MCH RBC QN AUTO: 33.1 PG (ref 26.1–32.9)
MCHC RBC AUTO-ENTMCNC: 35 G/DL (ref 31.4–35)
MCV RBC AUTO: 94.7 FL (ref 79.6–97.8)
MONOCYTES # BLD: 0.5 K/UL (ref 0.1–1.3)
MONOCYTES NFR BLD: 7 % (ref 4–12)
NEUTS SEG # BLD: 5.7 K/UL (ref 1.7–8.2)
NEUTS SEG NFR BLD: 75 % (ref 43–78)
NRBC # BLD: 0 K/UL (ref 0–0.2)
PLATELET # BLD AUTO: 162 K/UL (ref 150–450)
PMV BLD AUTO: 10 FL (ref 9.4–12.3)
POTASSIUM SERPL-SCNC: 3.6 MMOL/L (ref 3.5–5.1)
PROT SERPL-MCNC: 7.9 G/DL (ref 6.3–8.2)
RBC # BLD AUTO: 4.68 M/UL (ref 4.23–5.6)
SODIUM SERPL-SCNC: 136 MMOL/L (ref 136–145)
WBC # BLD AUTO: 7.6 K/UL (ref 4.3–11.1)

## 2018-09-04 PROCEDURE — 80053 COMPREHEN METABOLIC PANEL: CPT

## 2018-09-04 PROCEDURE — 85025 COMPLETE CBC W/AUTO DIFF WBC: CPT

## 2018-09-04 PROCEDURE — 80307 DRUG TEST PRSMV CHEM ANLYZR: CPT

## 2018-09-04 PROCEDURE — 99283 EMERGENCY DEPT VISIT LOW MDM: CPT | Performed by: EMERGENCY MEDICINE

## 2018-09-04 RX ORDER — LORAZEPAM 0.5 MG/1
1 TABLET ORAL
Qty: 1 TAB | Refills: 0 | Status: SHIPPED | OUTPATIENT
Start: 2018-09-04

## 2018-09-04 NOTE — ED NOTES
I have reviewed discharge instructions with the patient. The patient verbalized understanding. Patient left ED via Discharge Method: ambulatory to Home with self. Opportunity for questions and clarification provided. Patient given 1 scripts. To continue your aftercare when you leave the hospital, you may receive an automated call from our care team to check in on how you are doing. This is a free service and part of our promise to provide the best care and service to meet your aftercare needs.  If you have questions, or wish to unsubscribe from this service please call [phone]. Thank you for Choosing our [facility] Emergency Department.

## 2018-09-04 NOTE — ED TRIAGE NOTES
Pt states he is here for help with alcohol withdrawal and detox as well as his neuropathy. Pt's last drink was 2 hours ago, he drank around 6 beers and some bourbon. Pt states he has been on a binge for about a week. Pt states before this drinking binge, he had not drank like this for 5 years, drank socially during that time.

## 2018-09-04 NOTE — PROGRESS NOTES
[facility] at [address], [address] [place] [address], [address]  Phone: [phone]   Fax: [phone]    OUTPATIENT DAILY NOTE    NAME/AGE/GENDER: Anastasiia Mccall is a 48 y.o. male. DATE: 9/4/2018    Patient cancelled his appointment for today due to conflicting appointments. Will plan to follow up on next scheduled visit.     Aurora Shook, PT

## 2018-09-04 NOTE — ED PROVIDER NOTES
HPI Comments: 65-year-old male presenting for \"alcohol withdrawal\". States he's been drinking heavily for a week. Last week was multiple hours ago and he feels quite shaky\". He denies chest pain or shortness of breath. He states that he thinks he needs Ativan. He also wants something to eat. He denies any thoughts of suicide or injuring of others. Patient is a 48 y.o. male presenting with intoxication. Alcohol intoxication Primary symptoms include: intoxication. There areno confusion, no somnolence, no loss of consciousness, no seizures, no weakness, no agitation, no delusions, no hallucinations, no self-injury and no violence present at this time. This is a recurrent problem. The current episode started more than 1 week ago. The problem has not changed since onset. Suspected agents include alcohol. Pertinent negatives include no fever, no injury, no nausea, no vomiting, no bladder incontinence and no bowel incontinence. Associated medical issues include addiction treatment and chronic illness. Associated medical issues do not include withdrawal syndrome, mental illness, recent illness, recent infection, suicidal ideas, homicidal ideas, mental status change and depression. Past Medical History:  
Diagnosis Date  Hypertension   
 untreated  Other ill-defined conditions(349.89) alcoholism  Psychiatric disorder   
 depression Past Surgical History:  
Procedure Laterality Date  HX ORTHOPAEDIC    
 L arm and hip surgery No family history on file. Social History Social History  Marital status: SINGLE Spouse name: N/A  
 Number of children: N/A  
 Years of education: N/A Occupational History  Not on file. Social History Main Topics  Smoking status: Never Smoker  Smokeless tobacco: Current User  Alcohol use 30.0 oz/week 30 Cans of beer, 30 Shots of liquor per week  Drug use: No  
 Sexual activity: No  
 
Other Topics Concern  Not on file Social History Narrative ** Merged History Encounter ** ALLERGIES: Review of patient's allergies indicates no known allergies. Review of Systems Constitutional: Negative for fever. Gastrointestinal: Negative for bowel incontinence, nausea and vomiting. Genitourinary: Negative for bladder incontinence. Neurological: Positive for tremors. Negative for seizures, loss of consciousness and weakness. Psychiatric/Behavioral: Negative for agitation, confusion, depression, hallucinations, homicidal ideas, self-injury and suicidal ideas. All other systems reviewed and are negative. Vitals:  
 09/04/18 1606 BP: 136/87 Pulse: 82 Resp: 18 Temp: 99.4 °F (37.4 °C) SpO2: 95% Weight: 95.3 kg (210 lb) Height: 6' 5\" (1.956 m) Physical Exam  
Constitutional: He is oriented to person, place, and time. He appears well-developed and well-nourished. HENT:  
Head: Normocephalic and atraumatic. Eyes: Conjunctivae are normal. Pupils are equal, round, and reactive to light. Neck: Normal range of motion. Neck supple. Cardiovascular: Normal rate and regular rhythm. Pulmonary/Chest: Effort normal and breath sounds normal.  
Abdominal: Soft. Bowel sounds are normal.  
Musculoskeletal:  
rrequires a cane for ambulation due to peripheral neuropathy Neurological: He is alert and oriented to person, place, and time. Skin: Skin is warm and dry. Nursing note and vitals reviewed. MDM Number of Diagnoses or Management Options Diagnosis management comments: 61-year-old male presenting for what he declares as alcohol withdrawal.  He states she's been drinking heavily for a week and he thinks he's getting shaky and is going to go through active withdrawals. Patient's vital signs are reassuring as he is not hypertensive nor is he tachycardic.   On physical exam he is not tremulous his [place] is normal.  Unfortunately her is no  available at this time but we will place the patient's name on the intake sheet and he will be contacted tomorrow for further discussion of his issues. At this point I do not see any indication for keeping the patient admitted in the hospital nor do I see no indication for further workup. Amount and/or Complexity of Data Reviewed Clinical lab tests: ordered and reviewed Risk of Complications, Morbidity, and/or Mortality Presenting problems: moderate Diagnostic procedures: moderate Management options: moderate Patient Progress Patient progress: improved ED Course Procedures

## 2018-09-04 NOTE — DISCHARGE INSTRUCTIONS
We will place her name on a list to be contacted by social work in the morning. I'm providing me with 1 dose of Ativan for tonight. Anxiety Disorder: Care Instructions  Your Care Instructions    Anxiety is a normal reaction to stress. Difficult situations can cause you to have symptoms such as sweaty palms and a nervous feeling. In an anxiety disorder, the symptoms are far more severe. Constant worry, muscle tension, trouble sleeping, nausea and diarrhea, and other symptoms can make normal daily activities difficult or impossible. These symptoms may occur for no reason, and they can affect your work, school, or social life. Medicines, counseling, and self-care can all help. Follow-up care is a key part of your treatment and safety. Be sure to make and go to all appointments, and call your doctor if you are having problems. It's also a good idea to know your test results and keep a list of the medicines you take. How can you care for yourself at home? · Take medicines exactly as directed. Call your doctor if you think you are having a problem with your medicine. · Go to your counseling sessions and follow-up appointments. · Recognize and accept your anxiety. Then, when you are in a situation that makes you anxious, say to yourself, \"This is not an emergency. I feel uncomfortable, but I am not in danger. I can keep going even if I feel anxious. \"  · Be kind to your body:  ¨ Relieve tension with exercise or a massage. ¨ Get enough rest.  ¨ Avoid alcohol, caffeine, nicotine, and illegal drugs. They can increase your anxiety level and cause sleep problems. ¨ Learn and do relaxation techniques. See below for more about these techniques. · Engage your mind. Get out and do something you enjoy. Go to a funny movie, or take a walk or hike. Plan your day. Having too much or too little to do can make you anxious. · Keep a record of your symptoms.  Discuss your fears with a good friend or family member, or join a support group for people with similar problems. Talking to others sometimes relieves stress. · Get involved in social groups, or volunteer to help others. Being alone sometimes makes things seem worse than they are. · Get at least 30 minutes of exercise on most days of the week to relieve stress. Walking is a good choice. You also may want to do other activities, such as running, swimming, cycling, or playing tennis or team sports. Relaxation techniques  Do relaxation exercises 10 to 20 minutes a day. You can play soothing, relaxing music while you do them, if you wish. · Tell others in your house that you are going to do your relaxation exercises. Ask them not to disturb you. · Find a comfortable place, away from all distractions and noise. · Lie down on your back, or sit with your back straight. · Focus on your breathing. Make it slow and steady. · Breathe in through your nose. Breathe out through either your nose or mouth. · Breathe deeply, filling up the area between your navel and your rib cage. Breathe so that your belly goes up and down. · Do not hold your breath. · Breathe like this for 5 to 10 minutes. Notice the feeling of calmness throughout your whole body. As you continue to breathe slowly and deeply, relax by doing the following for another 5 to 10 minutes:  · Tighten and relax each muscle group in your body. You can begin at your toes and work your way up to your head. · Imagine your muscle groups relaxing and becoming heavy. · Empty your mind of all thoughts. · Let yourself relax more and more deeply. · Become aware of the state of calmness that surrounds you. · When your relaxation time is over, you can bring yourself back to alertness by moving your fingers and toes and then your hands and feet and then stretching and moving your entire body. Sometimes people fall asleep during relaxation, but they usually wake up shortly afterward.   · Always give yourself time to return to full alertness before you drive a car or do anything that might cause an accident if you are not fully alert. Never play a relaxation tape while you drive a car. When should you call for help? Call 911 anytime you think you may need emergency care. For example, call if:    · You feel you cannot stop from hurting yourself or someone else.   Gosia Duran the numbers for these national suicide hotlines: 6-868-375-TALK (0-917.887.1486) and 5-927-XRABBJZ (3-106.210.3230). If you or someone you know talks about suicide or feeling hopeless, get help right away.   Watch closely for changes in your health, and be sure to contact your doctor if:    · You have anxiety or fear that affects your life.     · You have symptoms of anxiety that are new or different from those you had before. Where can you learn more? Go to http://samantha-smooth.info/. Enter P754 in the search box to learn more about \"Anxiety Disorder: Care Instructions. \"  Current as of: December 7, 2017  Content Version: 11.7  © 6639-3928 [employer]. Care instructions adapted under license by [employer] (which disclaims liability or warranty for this information). If you have questions about a medical condition or this instruction, always ask your healthcare professional. [address] any warranty or liability for your use of this information.

## 2018-09-25 ENCOUNTER — APPOINTMENT (OUTPATIENT)
Dept: PHYSICAL THERAPY | Age: 53
End: 2018-09-25
Attending: PSYCHIATRY & NEUROLOGY

## 2018-09-27 ENCOUNTER — APPOINTMENT (OUTPATIENT)
Dept: PHYSICAL THERAPY | Age: 53
End: 2018-09-27
Attending: PSYCHIATRY & NEUROLOGY

## 2022-10-15 ENCOUNTER — HOSPITAL ENCOUNTER (EMERGENCY)
Dept: ULTRASOUND IMAGING | Age: 57
Discharge: HOME | DRG: 300 | End: 2022-10-18
Payer: MEDICAID

## 2022-10-15 ENCOUNTER — HOSPITAL ENCOUNTER (EMERGENCY)
Dept: CT IMAGING | Age: 57
Discharge: HOME | DRG: 300 | End: 2022-10-18
Payer: MEDICAID

## 2022-10-15 ENCOUNTER — HOSPITAL ENCOUNTER (INPATIENT)
Age: 57
LOS: 2 days | Discharge: ANOTHER HOSPITAL | DRG: 300 | End: 2022-10-17
Admitting: STUDENT IN AN ORGANIZED HEALTH CARE EDUCATION/TRAINING PROGRAM
Payer: MEDICAID

## 2022-10-15 DIAGNOSIS — I82.402 ACUTE DEEP VEIN THROMBOSIS (DVT) OF LEFT LOWER EXTREMITY, UNSPECIFIED VEIN (HCC): Primary | ICD-10-CM

## 2022-10-15 DIAGNOSIS — N39.0 URINARY TRACT INFECTION IN MALE: ICD-10-CM

## 2022-10-15 LAB
ALBUMIN SERPL-MCNC: 2.4 G/DL (ref 3.5–5)
ALBUMIN/GLOB SERPL: 0.5 {RATIO} (ref 0.4–1.6)
ALP SERPL-CCNC: 190 U/L (ref 50–136)
ALT SERPL-CCNC: 28 U/L (ref 12–65)
ANION GAP SERPL CALC-SCNC: 11 MMOL/L (ref 2–11)
APTT PPP: 122.7 SEC (ref 24.1–35.1)
APTT PPP: 34.3 SEC (ref 24.1–35.1)
AST SERPL-CCNC: 34 U/L (ref 15–37)
BACTERIA URNS QL MICRO: 0 /HPF
BILIRUB SERPL-MCNC: 1.4 MG/DL (ref 0.2–1.1)
BUN SERPL-MCNC: 8 MG/DL (ref 6–23)
CALCIUM SERPL-MCNC: 9.2 MG/DL (ref 8.3–10.4)
CASTS URNS QL MICRO: 0 /LPF
CHLORIDE SERPL-SCNC: 96 MMOL/L (ref 101–110)
CO2 SERPL-SCNC: 25 MMOL/L (ref 21–32)
CREAT SERPL-MCNC: 0.71 MG/DL (ref 0.8–1.5)
CRYSTALS URNS QL MICRO: 0 /LPF
EPI CELLS #/AREA URNS HPF: NORMAL /HPF
ERYTHROCYTE [DISTWIDTH] IN BLOOD BY AUTOMATED COUNT: 12.3 % (ref 11.9–14.6)
ERYTHROCYTE [DISTWIDTH] IN BLOOD BY AUTOMATED COUNT: 12.4 % (ref 11.9–14.6)
GLOBULIN SER CALC-MCNC: 5.1 G/DL (ref 2.8–4.5)
GLUCOSE SERPL-MCNC: 92 MG/DL (ref 65–100)
HCT VFR BLD AUTO: 33.2 % (ref 41.1–50.3)
HCT VFR BLD AUTO: 38.2 % (ref 41.1–50.3)
HGB BLD-MCNC: 11.7 G/DL (ref 13.6–17.2)
HGB BLD-MCNC: 13 G/DL (ref 13.6–17.2)
INR PPP: 1.1
MCH RBC QN AUTO: 33.6 PG (ref 26.1–32.9)
MCH RBC QN AUTO: 34.2 PG (ref 26.1–32.9)
MCHC RBC AUTO-ENTMCNC: 34 G/DL (ref 31.4–35)
MCHC RBC AUTO-ENTMCNC: 35.2 G/DL (ref 31.4–35)
MCV RBC AUTO: 97.1 FL (ref 82–102)
MCV RBC AUTO: 98.7 FL (ref 82–102)
MUCOUS THREADS URNS QL MICRO: 0 /LPF
NRBC # BLD: 0 K/UL (ref 0–0.2)
NRBC # BLD: 0 K/UL (ref 0–0.2)
NT PRO BNP: 55 PG/ML (ref 5–125)
PLATELET # BLD AUTO: 295 K/UL (ref 150–450)
PLATELET # BLD AUTO: 314 K/UL (ref 150–450)
PMV BLD AUTO: 9.4 FL (ref 9.4–12.3)
PMV BLD AUTO: 9.4 FL (ref 9.4–12.3)
POTASSIUM SERPL-SCNC: 3.7 MMOL/L (ref 3.5–5.1)
PROT SERPL-MCNC: 7.5 G/DL (ref 6.3–8.2)
PROTHROMBIN TIME: 15 SEC (ref 12.6–14.5)
RBC # BLD AUTO: 3.42 M/UL (ref 4.23–5.6)
RBC # BLD AUTO: 3.87 M/UL (ref 4.23–5.6)
RBC #/AREA URNS HPF: NORMAL /HPF
SODIUM SERPL-SCNC: 132 MMOL/L (ref 133–143)
UFH PPP CHRO-ACNC: 0.45 IU/ML (ref 0.3–0.7)
WBC # BLD AUTO: 7.9 K/UL (ref 4.3–11.1)
WBC # BLD AUTO: 8.6 K/UL (ref 4.3–11.1)
WBC URNS QL MICRO: NORMAL /HPF

## 2022-10-15 PROCEDURE — 85610 PROTHROMBIN TIME: CPT

## 2022-10-15 PROCEDURE — 2580000003 HC RX 258: Performed by: STUDENT IN AN ORGANIZED HEALTH CARE EDUCATION/TRAINING PROGRAM

## 2022-10-15 PROCEDURE — 6370000000 HC RX 637 (ALT 250 FOR IP): Performed by: FAMILY MEDICINE

## 2022-10-15 PROCEDURE — 2580000003 HC RX 258: Performed by: NURSE PRACTITIONER

## 2022-10-15 PROCEDURE — 83880 ASSAY OF NATRIURETIC PEPTIDE: CPT

## 2022-10-15 PROCEDURE — 88184 FLOWCYTOMETRY/ TC 1 MARKER: CPT

## 2022-10-15 PROCEDURE — 1100000000 HC RM PRIVATE

## 2022-10-15 PROCEDURE — 74177 CT ABD & PELVIS W/CONTRAST: CPT

## 2022-10-15 PROCEDURE — 86147 CARDIOLIPIN ANTIBODY EA IG: CPT

## 2022-10-15 PROCEDURE — 81015 MICROSCOPIC EXAM OF URINE: CPT

## 2022-10-15 PROCEDURE — 6360000004 HC RX CONTRAST MEDICATION: Performed by: NURSE PRACTITIONER

## 2022-10-15 PROCEDURE — 85520 HEPARIN ASSAY: CPT

## 2022-10-15 PROCEDURE — 93971 EXTREMITY STUDY: CPT

## 2022-10-15 PROCEDURE — 94760 N-INVAS EAR/PLS OXIMETRY 1: CPT

## 2022-10-15 PROCEDURE — 85730 THROMBOPLASTIN TIME PARTIAL: CPT

## 2022-10-15 PROCEDURE — 6370000000 HC RX 637 (ALT 250 FOR IP): Performed by: STUDENT IN AN ORGANIZED HEALTH CARE EDUCATION/TRAINING PROGRAM

## 2022-10-15 PROCEDURE — 80053 COMPREHEN METABOLIC PANEL: CPT

## 2022-10-15 PROCEDURE — 88185 FLOWCYTOMETRY/TC ADD-ON: CPT

## 2022-10-15 PROCEDURE — 6360000002 HC RX W HCPCS: Performed by: EMERGENCY MEDICINE

## 2022-10-15 PROCEDURE — 99252 IP/OBS CONSLTJ NEW/EST SF 35: CPT | Performed by: INTERNAL MEDICINE

## 2022-10-15 PROCEDURE — 85027 COMPLETE CBC AUTOMATED: CPT

## 2022-10-15 PROCEDURE — 86146 BETA-2 GLYCOPROTEIN ANTIBODY: CPT

## 2022-10-15 PROCEDURE — 36415 COLL VENOUS BLD VENIPUNCTURE: CPT

## 2022-10-15 PROCEDURE — 6360000002 HC RX W HCPCS: Performed by: STUDENT IN AN ORGANIZED HEALTH CARE EDUCATION/TRAINING PROGRAM

## 2022-10-15 PROCEDURE — 99285 EMERGENCY DEPT VISIT HI MDM: CPT

## 2022-10-15 RX ORDER — HEPARIN SODIUM 1000 [USP'U]/ML
80 INJECTION, SOLUTION INTRAVENOUS; SUBCUTANEOUS PRN
Status: DISCONTINUED | OUTPATIENT
Start: 2022-10-15 | End: 2022-10-15 | Stop reason: SDUPTHER

## 2022-10-15 RX ORDER — HEPARIN SODIUM 10000 [USP'U]/100ML
5-30 INJECTION, SOLUTION INTRAVENOUS CONTINUOUS
Status: DISCONTINUED | OUTPATIENT
Start: 2022-10-15 | End: 2022-10-17 | Stop reason: SDUPTHER

## 2022-10-15 RX ORDER — SODIUM CHLORIDE 0.9 % (FLUSH) 0.9 %
10 SYRINGE (ML) INJECTION
Status: COMPLETED | OUTPATIENT
Start: 2022-10-15 | End: 2022-10-15

## 2022-10-15 RX ORDER — MAGNESIUM SULFATE IN WATER 40 MG/ML
2000 INJECTION, SOLUTION INTRAVENOUS PRN
Status: DISCONTINUED | OUTPATIENT
Start: 2022-10-15 | End: 2022-10-17

## 2022-10-15 RX ORDER — GABAPENTIN 100 MG/1
600 CAPSULE ORAL 3 TIMES DAILY
COMMUNITY
End: 2022-10-25 | Stop reason: CLARIF

## 2022-10-15 RX ORDER — LORAZEPAM 1 MG/1
1 TABLET ORAL DAILY PRN
COMMUNITY

## 2022-10-15 RX ORDER — 0.9 % SODIUM CHLORIDE 0.9 %
100 INTRAVENOUS SOLUTION INTRAVENOUS
Status: COMPLETED | OUTPATIENT
Start: 2022-10-15 | End: 2022-10-15

## 2022-10-15 RX ORDER — LORAZEPAM 1 MG/1
1 TABLET ORAL DAILY PRN
Status: DISCONTINUED | OUTPATIENT
Start: 2022-10-15 | End: 2022-10-17

## 2022-10-15 RX ORDER — POLYETHYLENE GLYCOL 3350 17 G/17G
17 POWDER, FOR SOLUTION ORAL DAILY PRN
Status: DISCONTINUED | OUTPATIENT
Start: 2022-10-15 | End: 2022-10-17

## 2022-10-15 RX ORDER — HEPARIN SODIUM 1000 [USP'U]/ML
80 INJECTION, SOLUTION INTRAVENOUS; SUBCUTANEOUS PRN
Status: DISCONTINUED | OUTPATIENT
Start: 2022-10-15 | End: 2022-10-17 | Stop reason: SDUPTHER

## 2022-10-15 RX ORDER — POTASSIUM CHLORIDE 7.45 MG/ML
10 INJECTION INTRAVENOUS PRN
Status: DISCONTINUED | OUTPATIENT
Start: 2022-10-15 | End: 2022-10-17

## 2022-10-15 RX ORDER — POTASSIUM CHLORIDE 20 MEQ/1
40 TABLET, EXTENDED RELEASE ORAL PRN
Status: DISCONTINUED | OUTPATIENT
Start: 2022-10-15 | End: 2022-10-17

## 2022-10-15 RX ORDER — SODIUM CHLORIDE 9 MG/ML
INJECTION, SOLUTION INTRAVENOUS PRN
Status: DISCONTINUED | OUTPATIENT
Start: 2022-10-15 | End: 2022-10-17

## 2022-10-15 RX ORDER — SODIUM CHLORIDE 0.9 % (FLUSH) 0.9 %
5-40 SYRINGE (ML) INJECTION PRN
Status: DISCONTINUED | OUTPATIENT
Start: 2022-10-15 | End: 2022-10-17

## 2022-10-15 RX ORDER — GABAPENTIN 100 MG/1
100 CAPSULE ORAL 3 TIMES DAILY
Status: DISCONTINUED | OUTPATIENT
Start: 2022-10-15 | End: 2022-10-15

## 2022-10-15 RX ORDER — SODIUM CHLORIDE 0.9 % (FLUSH) 0.9 %
5-40 SYRINGE (ML) INJECTION EVERY 12 HOURS SCHEDULED
Status: DISCONTINUED | OUTPATIENT
Start: 2022-10-15 | End: 2022-10-17

## 2022-10-15 RX ORDER — HEPARIN SODIUM 1000 [USP'U]/ML
40 INJECTION, SOLUTION INTRAVENOUS; SUBCUTANEOUS PRN
Status: DISCONTINUED | OUTPATIENT
Start: 2022-10-15 | End: 2022-10-15 | Stop reason: SDUPTHER

## 2022-10-15 RX ORDER — ONDANSETRON 2 MG/ML
4 INJECTION INTRAMUSCULAR; INTRAVENOUS EVERY 6 HOURS PRN
Status: DISCONTINUED | OUTPATIENT
Start: 2022-10-15 | End: 2022-10-17

## 2022-10-15 RX ORDER — GABAPENTIN 300 MG/1
600 CAPSULE ORAL 3 TIMES DAILY
Status: DISCONTINUED | OUTPATIENT
Start: 2022-10-16 | End: 2022-10-17

## 2022-10-15 RX ORDER — ONDANSETRON 4 MG/1
4 TABLET, ORALLY DISINTEGRATING ORAL EVERY 8 HOURS PRN
Status: DISCONTINUED | OUTPATIENT
Start: 2022-10-15 | End: 2022-10-17

## 2022-10-15 RX ORDER — HEPARIN SODIUM 1000 [USP'U]/ML
80 INJECTION, SOLUTION INTRAVENOUS; SUBCUTANEOUS ONCE
Status: COMPLETED | OUTPATIENT
Start: 2022-10-15 | End: 2022-10-15

## 2022-10-15 RX ORDER — HEPARIN SODIUM 1000 [USP'U]/ML
80 INJECTION, SOLUTION INTRAVENOUS; SUBCUTANEOUS ONCE
Status: DISCONTINUED | OUTPATIENT
Start: 2022-10-15 | End: 2022-10-15 | Stop reason: SDUPTHER

## 2022-10-15 RX ORDER — ACETAMINOPHEN 325 MG/1
650 TABLET ORAL EVERY 6 HOURS PRN
Status: DISCONTINUED | OUTPATIENT
Start: 2022-10-15 | End: 2022-10-17

## 2022-10-15 RX ORDER — HEPARIN SODIUM 1000 [USP'U]/ML
40 INJECTION, SOLUTION INTRAVENOUS; SUBCUTANEOUS PRN
Status: DISCONTINUED | OUTPATIENT
Start: 2022-10-15 | End: 2022-10-17 | Stop reason: SDUPTHER

## 2022-10-15 RX ORDER — HEPARIN SODIUM 10000 [USP'U]/100ML
5-30 INJECTION, SOLUTION INTRAVENOUS CONTINUOUS
Status: DISCONTINUED | OUTPATIENT
Start: 2022-10-15 | End: 2022-10-15 | Stop reason: SDUPTHER

## 2022-10-15 RX ORDER — ACETAMINOPHEN 650 MG/1
650 SUPPOSITORY RECTAL EVERY 6 HOURS PRN
Status: DISCONTINUED | OUTPATIENT
Start: 2022-10-15 | End: 2022-10-17

## 2022-10-15 RX ADMIN — SODIUM CHLORIDE, PRESERVATIVE FREE 10 ML: 5 INJECTION INTRAVENOUS at 20:55

## 2022-10-15 RX ADMIN — GABAPENTIN 500 MG: 400 CAPSULE ORAL at 22:42

## 2022-10-15 RX ADMIN — SODIUM CHLORIDE 100 ML: 9 INJECTION, SOLUTION INTRAVENOUS at 13:04

## 2022-10-15 RX ADMIN — ACETAMINOPHEN 650 MG: 325 TABLET, FILM COATED ORAL at 20:50

## 2022-10-15 RX ADMIN — SODIUM CHLORIDE, PRESERVATIVE FREE 10 ML: 5 INJECTION INTRAVENOUS at 13:04

## 2022-10-15 RX ADMIN — IOPAMIDOL 100 ML: 755 INJECTION, SOLUTION INTRAVENOUS at 13:04

## 2022-10-15 RX ADMIN — HEPARIN SODIUM 18 UNITS/KG/HR: 10000 INJECTION, SOLUTION INTRAVENOUS at 16:38

## 2022-10-15 RX ADMIN — CEFTRIAXONE 1000 MG: 1 INJECTION, POWDER, FOR SOLUTION INTRAMUSCULAR; INTRAVENOUS at 16:04

## 2022-10-15 RX ADMIN — GABAPENTIN 100 MG: 100 CAPSULE ORAL at 20:50

## 2022-10-15 RX ADMIN — HEPARIN SODIUM 7260 UNITS: 1000 INJECTION INTRAVENOUS; SUBCUTANEOUS at 16:37

## 2022-10-15 ASSESSMENT — PAIN DESCRIPTION - ORIENTATION: ORIENTATION: LEFT

## 2022-10-15 ASSESSMENT — PAIN DESCRIPTION - DESCRIPTORS: DESCRIPTORS: SORE

## 2022-10-15 ASSESSMENT — PAIN SCALES - GENERAL: PAINLEVEL_OUTOF10: 4

## 2022-10-15 ASSESSMENT — PAIN DESCRIPTION - LOCATION: LOCATION: LEG

## 2022-10-15 NOTE — ED NOTES
TRANSFER - OUT REPORT:    Verbal report given to [facility] RN on Татьяна Gutierrez  being transferred to room 350 for routine progression of patient care       Report consisted of patient's Situation, Background, Assessment and   Recommendations(SBAR). Information from the following report(s) ED SBAR was reviewed with the receiving nurse. Lines:   Peripheral IV 10/15/22 Right Antecubital (Active)   Site Assessment Clean, dry & intact 10/15/22 1117   Line Status Normal saline locked; Blood return noted;Specimen collected; Flushed 10/15/22 1117   Phlebitis Assessment No symptoms 10/15/22 1117   Infiltration Assessment 0 10/15/22 1117       Peripheral IV 34/45/03 Left Basilic (Active)   Site Assessment Clean, dry & intact 10/15/22 1628   Line Status Blood return noted;Capped;Flushed 10/15/22 1628   Phlebitis Assessment No symptoms 10/15/22 1628   Infiltration Assessment 0 10/15/22 1628        Opportunity for questions and clarification was provided.       Patient transported with:  Registered Nurse       Jyotsna Duffy RN  10/15/22 2005

## 2022-10-15 NOTE — ED TRIAGE NOTES
Pt states that about 5 days ago he started having swelling in his upper left leg. Pt states he's also had some painful urination and some low back pain as well.

## 2022-10-15 NOTE — H&P
Hospitalist History and Physical   Admit Date:  10/15/2022 11:25 AM   Name:  Frank Chavez   Age:  56 y.o. Sex:  male  :  1965   MRN:  [id number]   Room:  [facility]/    Presenting Complaint: Leg Pain and Urinary Pain     Reason(s) for Admission: Acute deep vein thrombosis (DVT) of popliteal vein of left lower extremity (HCC) [I82.432]  Urinary tract infection in male [N39.0]  Acute deep vein thrombosis (DVT) of left lower extremity, unspecified vein (HCC) [I82.402]     History of Present Illness:   Frank Chavez is a 56 y.o. male with medical history of polycythemia vera, IVC filter placement who presented with unilateral swelling and edema of his left lower extremity and tenderness to his left thigh. Patient stated that symptoms started worsening over the past few days. Patient states that he had worsening ability to get around the house. Patient was just recently given a prescription of Bactrim for urinary tract infection but stated he still complains of similar symptoms. Patient is very poor historian. When asked about IVC filter patient was unaware he had one placed. No recollection of past medical history including polycythemia vera. CT of abdomen suspicious for cystitis. Ultrasound showed extensive DVTs of left lower extremity. Infrarenal IVC filter with suggestion of large thrombus burden distal to the IVC filter some intra device thrombus appears to extend superior/proximal to the filter. Patient denied any cardiac chest pain, shortness of breath, abdominal pain, fever/chills. Review of Systems:  10 systems reviewed and negative except as noted in HPI.   Assessment & Plan:   Acute deep vein thrombosis (DVT) of popliteal vein of left lower extremity (HCC)  -As evidenced on lower extremity Doppler  - Patient started on heparin drip  - Hematology consulted  - Continue to monitor platelet count daily  - Patient's vital stable, patient not in any respiratory distress    Polycythemia vera  - Oncology consulted, appreciate recommendations    IVC filter clot burden  - IR consulted, appreciate recommendations  - Continue heparin    Peripheral neuropathy  - Continue home gabapentin    Anxiety  - Continue home Ativan          Anticipated discharge needs:   Spoke pending clinical course    Diet: ADULT DIET; Regular; 3 carb choices (45 gm/meal); Low Fat/Low Chol/High Fiber/2 gm Na  VTE ppx: Heparin drip  Code status: Full Code    Hospital Problems:  Principal Problem:    Acute deep vein thrombosis (DVT) of popliteal vein of left lower extremity (HCC)  Resolved Problems:    * No resolved hospital problems. *       Past History:     History reviewed. No pertinent past medical history. Past Surgical History:   Procedure Laterality Date    HIP SURGERY Left     WRIST SURGERY Right         Social History     Tobacco Use    Smoking status: Not on file    Smokeless tobacco: Not on file   Substance Use Topics    Alcohol use: Not on file      Social History     Substance and Sexual Activity   Drug Use Not on file       History reviewed. No pertinent family history. There is no immunization history on file for this patient. No Known Allergies  Prior to Admit Medications:  Current Outpatient Medications   Medication Instructions    gabapentin (NEURONTIN) 600 mg, Oral, 3 TIMES DAILY    LORazepam (ATIVAN) 1 mg, Oral         Objective:   Patient Vitals for the past 24 hrs:   Temp Pulse Resp BP SpO2   10/15/22 1647 -- -- -- -- 99 %   10/15/22 1644 -- 98 17 110/70 99 %   10/15/22 1430 -- 78 17 132/82 98 %   10/15/22 1106 97.6 °F (36.4 °C) 98 18 111/71 99 %       Oxygen Therapy  SpO2: 99 %    Estimated body mass index is 24.34 kg/m² as calculated from the following:    Height as of this encounter: 6' 4\" (1.93 m). Weight as of this encounter: 200 lb (90.7 kg).   No intake or output data in the 24 hours ending 10/15/22 1728      Physical Exam:  Blood pressure 110/70, pulse 98, temperature 97.6 °F (36.4 °C), temperature source Oral, resp. rate 17, height 6' 4\" (1.93 m), weight 200 lb (90.7 kg), SpO2 99 %. General:    Well nourished. Head:  Normocephalic, atraumatic  Eyes:  Sclerae appear normal.  Pupils equally round. ENT:  Nares appear normal, no drainage. Moist oral mucosa  Neck:  No restricted ROM. Trachea midline   CV:   RRR. No m/r/g. No jugular venous distension. Lungs:   CTAB. No wheezing, rhonchi, or rales. Symmetric expansion. Abdomen: Bowel sounds present. Soft, nontender, nondistended. Extremities: No cyanosis or clubbing. Left lower extremity swelling and tenderness. Skin:     No rashes and normal coloration. Warm and dry. Neuro:  CN II-XII grossly intact. Sensation intact. A&Ox3  Psych:  Normal mood and affect.       I have personally reviewed labs and tests showing:  Recent Labs:  Recent Results (from the past 24 hour(s))   CBC    Collection Time: 10/15/22 11:17 AM   Result Value Ref Range    WBC 8.6 4.3 - 11.1 K/uL    RBC 3.87 (L) 4.23 - 5.6 M/uL    Hemoglobin 13.0 (L) 13.6 - 17.2 g/dL    Hematocrit 38.2 (L) 41.1 - 50.3 %    MCV 98.7 82.0 - 102.0 FL    MCH 33.6 (H) 26.1 - 32.9 PG    MCHC 34.0 31.4 - 35.0 g/dL    RDW 12.4 11.9 - 14.6 %    Platelets 798 650 - 462 K/uL    MPV 9.4 9.4 - 12.3 FL    nRBC 0.00 0.0 - 0.2 K/uL   CMP    Collection Time: 10/15/22 11:17 AM   Result Value Ref Range    Sodium 132 (L) 133 - 143 mmol/L    Potassium 3.7 3.5 - 5.1 mmol/L    Chloride 96 (L) 101 - 110 mmol/L    CO2 25 21 - 32 mmol/L    Anion Gap 11 2 - 11 mmol/L    Glucose 92 65 - 100 mg/dL    BUN 8 6 - 23 MG/DL    Creatinine 0.71 (L) 0.8 - 1.5 MG/DL    Est, Glom Filt Rate >60 >60 ml/min/1.73m2    Calcium 9.2 8.3 - 10.4 MG/DL    Total Bilirubin 1.4 (H) 0.2 - 1.1 MG/DL    ALT 28 12 - 65 U/L    AST 34 15 - 37 U/L    Alk Phosphatase 190 (H) 50 - 136 U/L    Total Protein 7.5 6.3 - 8.2 g/dL    Albumin 2.4 (L) 3.5 - 5.0 g/dL    Globulin 5.1 (H) 2.8 - 4.5 g/dL    Albumin/Globulin Ratio 0.5 0.4 - 1.6 Brain Natriuretic Peptide    Collection Time: 10/15/22 11:17 AM   Result Value Ref Range    NT Pro-BNP 55 5 - 125 PG/ML   APTT    Collection Time: 10/15/22  3:57 PM   Result Value Ref Range    PTT 34.3 24.1 - 35.1 SEC   Protime-INR    Collection Time: 10/15/22  3:57 PM   Result Value Ref Range    Protime 15.0 (H) 12.6 - 14.5 sec    INR 1.1         I have personally reviewed imaging studies showing:  CT ABDOMEN PELVIS W IV CONTRAST Additional Contrast? None    Result Date: 10/15/2022  EXAMINATION: CT ABDOMEN PELVIS W IV CONTRAST 10/15/2022 1:09 PM ACCESSION NUMBER: [id number] COMPARISON: CT chest/abdomen February 21, 2011 INDICATION: lower abd pain/tenderness TECHNIQUE: Contiguous axial computed tomographic images were obtained from the domes of the diaphragm to the symphysis pubis following administration 100mL Iso-brandin 370. Coronal reconstructions were also performed. Radiation dose reduction techniques were used for this study. Our CT scanners use one or all of the following: Automated exposure control, adjustment of the mA and/or kV according to patient size, iterative reconstruction. FINDINGS: LUNG BASES: No airspace consolidation within the lung bases. No sizable pleural effusion. The heart is not enlarged. LIVER: The liver contour is normal. No suspicious liver lesion. BILIARY TREE: Calcified gallstones are present. No gallbladder wall thickening or pericholecystic edema. No biliary dilation. SPLEEN: Normal. PANCREAS: No pancreatic mass or ductal dilation. ADRENALS: Normal. KIDNEYS/BLADDER: The kidneys are symmetric in size. No renal calculus or hydronephrosis. No renal mass. Diffuse bladder wall thickening irregular appearance and adjacent stranding. There is ureteral wall hyperenhancement and periureteral stranding. Uniform enhancement of the kidneys. BOWEL: The colon is unremarkable. The small bowel is normal in caliber. No bowel wall thickening.  APPENDIX: The appendix is normal. PERITONEUM/RETROPERITONEUM: No ascites or free air. No pelvic or retroperitoneal lymphadenopathy. VESSELS: No abdominal aortic aneurysm. Infrarenal IVC filter is present. There is hypoenhancement of the distal IVC and iliac veins with hyperexpansion suggesting thrombosis with particular prominence on the left and significant perivascular stranding. In addition, there is suggestion of thrombus extending superior/proximal through the IVC filter (series 601, image 51). ABDOMINAL WALL: No hernia or mass. REPRODUCTIVE: Unremarkable. BONES: No suspicious osseous lesion. Total left hip arthroplasty is present. Chronic deformity of the left inferior pubic ramus. 1.  Infrarenal IVC filter with suggestion of large thrombus burden distal to the IVC filter and prominent perivascular stranding left greater than right. In addition, some of the intra device thrombus appears to extend superior/proximal to the filter. 2.  Findings likely representing cystitis with features concerning for ascending infection. No CT evidence of pyelonephritis. Vascular duplex lower extremity venous left    Result Date: 10/15/2022  LEFT LOWER EXTREMITY DOPPLER ULTRASOUND 10/15/2022  HISTORY:    LEG SWELLING, PAIN, DVT SUSPECTED Technique: Sonographic imaging of the deep veins of the left leg was performed FINDINGS: There is an extensive left lower extremity DVT with occlusion of the left common femoral vein, profunda, and femoral vein. Nonocclusive thrombus is present in the greater saphenous vein and there is partially occlusive thrombus within the popliteal vein. One of the posterior tibial veins is occluded. Peroneal veins are patent. Extensive left lower extremity DVT. Echocardiogram:  No results found for this or any previous visit.         Orders Placed This Encounter   Medications    heparin (porcine) injection 7,260 Units    heparin (porcine) injection 7,260 Units    heparin (porcine) injection 3,630 Units    heparin 25,000 units in dextrose 5% 250 mL (premix) infusion    gabapentin (NEURONTIN) capsule 100 mg    LORazepam (ATIVAN) tablet 1 mg    sodium chloride flush 0.9 % injection 5-40 mL    sodium chloride flush 0.9 % injection 5-40 mL    0.9 % sodium chloride infusion    OR Linked Order Group     ondansetron (ZOFRAN-ODT) disintegrating tablet 4 mg     ondansetron (ZOFRAN) injection 4 mg    polyethylene glycol (GLYCOLAX) packet 17 g    OR Linked Order Group     acetaminophen (TYLENOL) tablet 650 mg     acetaminophen (TYLENOL) suppository 650 mg    magnesium sulfate 2000 mg in 50 mL IVPB premix    potassium chloride 10 mEq/100 mL IVPB (Peripheral Line)    OR Linked Order Group     potassium chloride (KLOR-CON M) extended release tablet 40 mEq     potassium bicarb-citric acid (EFFER-K) effervescent tablet 40 mEq     potassium chloride 10 mEq/100 mL IVPB (Peripheral Line)    DISCONTD: heparin (porcine) injection 7,260 Units    DISCONTD: heparin (porcine) injection 7,260 Units    DISCONTD: heparin (porcine) injection 3,630 Units    DISCONTD: heparin 25,000 units in dextrose 5% 250 mL (premix) infusion    cefTRIAXone (ROCEPHIN) 1,000 mg in sodium chloride 0.9 % 50 mL IVPB mini-bag     Order Specific Question:   Antimicrobial Indications     Answer:   Urinary Tract Infection     Order Specific Question:   UTI duration of therapy     Answer:   5 days         Signed:  Gómez Cortes MD    Part of this note may have been written by using a voice dictation software. The note has been proof read but may still contain some grammatical/other typographical errors.

## 2022-10-15 NOTE — CONSULTS
Inpatient Hematology / Oncology History and Physical    Reason for Admission:  Acute deep vein thrombosis (DVT) of popliteal vein of left lower extremity ([facility]) [I82.432]  Urinary tract infection in male [N39.0]  Acute deep vein thrombosis (DVT) of left lower extremity, unspecified vein ([facility]) [I82.402]    History of Present Illness: This is a pleasant 56years old male patient with history of secondary erythrocytosis and chronic neuropathy in his feet (uses walker to ambulate) who was admitted with complaints of lower abdominal pain and left lower extremity pain and swelling that has progressively worsened over the last week. He was recently prescribed Bactrim by his primary care physician for treatment of suspected UTI. Reports a few days of dark-colored urine. Ultrasound showed extensive DVT of left lower extremity with occlusion of the left common femoral vein, profunda and femoral vein. Nonocclusive thrombus was seen in the great saphenous vein and partially occlusive thrombus within the popliteal vein. One of the posterior tibial veins is occluded. CT abdomen showed infrarenal IVC filter with suggestion of large thrombus burden distal to the IVC filter and prominent perivascular stranding. Some of the intra device thrombus appears to extend superior/proximal to the filter. Findings likely representing cystitis were also seen. No evidence of pyelonephritis. On evaluation today, patient reports no significant improvement in left lower extremity pain since admission. He is unable to recall anything relating to an IVC filter placement. On further probing, he provides a history of having what sounds like intracranial hemorrhage many years ago for which he required evacuation of hematoma. He required ICU stay and was unconscious for many days. He thinks that might be the only time that an IVC filter may have been placed but is not sure about any other episodes of VTE in his life.   He denies any recent prolonged travel, immobilization, long bone fracture, major surgeries, abdominal distention, new/unusual sites of bone pain, swollen glands in the body, night sweats, fever, headache or unintentional weight loss. Notably was seen by Dr. Maureen [facility] oncologist) in April of this year for erythrocytosis. JAK2 negative, erythropoietin normal.    Patient has been started on heparin infusion. Review of Systems:  14 point ROS was negative except as above. No Known Allergies  History reviewed. No pertinent past medical history. Past Surgical History:   Procedure Laterality Date    HIP SURGERY Left     WRIST SURGERY Right      History reviewed. No pertinent family history.   Social History     Socioeconomic History    Marital status: Single     Spouse name: Not on file    Number of children: Not on file    Years of education: Not on file    Highest education level: Not on file   Occupational History    Not on file   Tobacco Use    Smoking status: Not on file    Smokeless tobacco: Not on file   Substance and Sexual Activity    Alcohol use: Not on file    Drug use: Not on file    Sexual activity: Not on file   Other Topics Concern    Not on file   Social History Narrative    Not on file     Social Determinants of Health     Financial Resource Strain: Not on file   Food Insecurity: Not on file   Transportation Needs: Not on file   Physical Activity: Not on file   Stress: Not on file   Social Connections: Not on file   Intimate Partner Violence: Not on file   Housing Stability: Not on file     Current Facility-Administered Medications   Medication Dose Route Frequency Provider Last Rate Last Admin    heparin (porcine) injection 7,260 Units  80 Units/kg IntraVENous PRN Louis Garrett MD        heparin (porcine) injection 3,630 Units  40 Units/kg IntraVENous PRN Louis Garrett MD        heparin 25,000 units in dextrose 5% 250 mL (premix) infusion  5-30 Units/kg/hr IntraVENous Continuous Fish Aaron MD 16.3 mL/hr at 10/15/22 1703 18 Units/kg/hr at 10/15/22 1703    gabapentin (NEURONTIN) capsule 100 mg  100 mg Oral TID Jessica Martinez MD        LORazepam (ATIVAN) tablet 1 mg  1 mg Oral Daily PRN Jessica Martinez MD        sodium chloride flush 0.9 % injection 5-40 mL  5-40 mL IntraVENous 2 times per day Jessica Martinez MD        sodium chloride flush 0.9 % injection 5-40 mL  5-40 mL IntraVENous PRN Jessica Martinez MD        0.9 % sodium chloride infusion   IntraVENous PRN Jessica Martinez MD        ondansetron (ZOFRAN-ODT) disintegrating tablet 4 mg  4 mg Oral Q8H PRN Jessica Martinez MD        Or    ondansetron [facility]) injection 4 mg  4 mg IntraVENous Q6H PRN Jessica Martinez MD        polyethylene glycol (GLYCOLAX) packet 17 g  17 g Oral Daily PRN Jessica Martinez MD        acetaminophen (TYLENOL) tablet 650 mg  650 mg Oral Q6H PRN Jessica Martinez MD        Or    acetaminophen (TYLENOL) suppository 650 mg  650 mg Rectal Q6H PRN Jessica Martinez MD        magnesium sulfate 2000 mg in 50 mL IVPB premix  2,000 mg IntraVENous PRN Jessica Martinez MD        potassium chloride 10 mEq/100 mL IVPB (Peripheral Line)  10 mEq IntraVENous PRN Jessica Martinez MD        potassium chloride (KLOR-CON M) extended release tablet 40 mEq  40 mEq Oral PRN Jessica Martinez MD        Or    potassium bicarb-citric acid (EFFER-K) effervescent tablet 40 mEq  40 mEq Oral PRN Jessica Martinez MD        Or    potassium chloride 10 mEq/100 mL IVPB (Peripheral Line)  10 mEq IntraVENous PRN Jessica Martinez MD        cefTRIAXone (ROCEPHIN) 1,000 mg in sodium chloride 0.9 % 50 mL IVPB mini-bag  1,000 mg IntraVENous Q24H Jessica Martinez MD   Stopped at 10/15/22 1635       Family History: Non contributory other than that elaborated in HPI as appropriate.      OBJECTIVE:  Patient Vitals for the past 8 hrs:   BP Temp Temp src Pulse Resp SpO2 Height Weight   10/15/22 1647 -- -- -- -- -- 99 % -- --   10/15/22 1644 110/70 -- -- 98 17 99 % -- --   10/15/22 1430 132/82 -- -- 78 17 98 % -- --   10/15/22 1106 111/71 97.6 °F (36.4 °C) Oral 98 18 99 % 6' 4\" (1.93 m) 200 lb (90.7 kg)     Temp (24hrs), Av.6 °F (36.4 °C), Min:97.6 °F (36.4 °C), Max:97.6 °F (36.4 °C)    No intake/output data recorded. Physical Exam:  PHYSICAL EXAMINATION:  Vitals were reviewed. /70   Pulse 98   Temp 97.6 °F (36.4 °C) (Oral)   Resp 17   Ht 6' 4\" (1.93 m)   Wt 200 lb (90.7 kg)   SpO2 99%   BMI 24.34 kg/m²   Patient alert and oriented x 3, in no acute distress  Integumentary: No Pallor, no icterus  HEENT: Moist mucous membranes, normal oropharynx  Lymph nodes: No cervical lymphadenopathy  Cardiovascular:RRR, S1, S2 present, no m/r/g   Lungs: Clear to auscultation, no rales or wheezing, no accessory muscle use  Abdomen: Soft, and non-tender on palpation, no organomegaly, bowel sounds audible  Extremities: Lower extremity edema is present. , left lower extremity pain swelling and tenderness is noted.   Neurological: patient can follow commands and move all extremities        Labs:    Recent Results (from the past 24 hour(s))   CBC    Collection Time: 10/15/22 11:17 AM   Result Value Ref Range    WBC 8.6 4.3 - 11.1 K/uL    RBC 3.87 (L) 4.23 - 5.6 M/uL    Hemoglobin 13.0 (L) 13.6 - 17.2 g/dL    Hematocrit 38.2 (L) 41.1 - 50.3 %    MCV 98.7 82.0 - 102.0 FL    MCH 33.6 (H) 26.1 - 32.9 PG    MCHC 34.0 31.4 - 35.0 g/dL    RDW 12.4 11.9 - 14.6 %    Platelets 086 848 - 906 K/uL    MPV 9.4 9.4 - 12.3 FL    nRBC 0.00 0.0 - 0.2 K/uL   CMP    Collection Time: 10/15/22 11:17 AM   Result Value Ref Range    Sodium 132 (L) 133 - 143 mmol/L    Potassium 3.7 3.5 - 5.1 mmol/L    Chloride 96 (L) 101 - 110 mmol/L    CO2 25 21 - 32 mmol/L    Anion Gap 11 2 - 11 mmol/L    Glucose 92 65 - 100 mg/dL    BUN 8 6 - 23 MG/DL    Creatinine 0.71 (L) 0.8 - 1.5 MG/DL    Est, Glom Filt Rate >60 >60 ml/min/1.73m2    Calcium 9.2 8.3 - 10.4 MG/DL    Total Bilirubin 1.4 (H) 0.2 - 1.1 hyperenhancement and  periureteral stranding. Uniform enhancement of the kidneys. BOWEL: The colon is unremarkable. The small bowel is normal in caliber. No bowel  wall thickening. APPENDIX: The appendix is normal.    PERITONEUM/RETROPERITONEUM: No ascites or free air. No pelvic or retroperitoneal  lymphadenopathy. VESSELS: No abdominal aortic aneurysm. Infrarenal IVC filter is present. There  is hypoenhancement of the distal IVC and iliac veins with hyperexpansion  suggesting thrombosis with particular prominence on the left and significant  perivascular stranding. In addition, there is suggestion of thrombus extending  superior/proximal through the IVC filter (series 601, image 51). ABDOMINAL WALL: No hernia or mass. REPRODUCTIVE: Unremarkable. BONES: No suspicious osseous lesion. Total left hip arthroplasty is present. Chronic deformity of the left inferior pubic ramus. Impression  1. Infrarenal IVC filter with suggestion of large thrombus burden distal to the  IVC filter and prominent perivascular stranding left greater than right. In  addition, some of the intra device thrombus appears to extend superior/proximal  to the filter. 2.  Findings likely representing cystitis with features concerning for ascending  infection. No CT evidence of pyelonephritis. ASSESSMENT:  -Extensive left lower extremity DVT  -Presumably prior history of DVT based on presence of infrarenal IVC filter-patient has no recollection of such event.  -Large thrombus burden distal to IVC filter  -Secondary erythrocytosis  -Chronic neuropathy      PLAN:  Presence of IVC filter suggests previous history of DVT. Current episode is felt to be a recurrent event. Patient will therefore require indefinite anticoagulation in absence of contraindications. This was discussed at length with the patient. Send testing for beta-2 microglobulin and anticardiolipin antibodies.   If negative, he can be transitioned to one of the DOACs (apixaban or Xarelto) at discharge. Also send peripheral blood flow cytometry for CD55 and CD59 testing (complaint of dark urine, thrombosis)  Recommend age-appropriate cancer screening as outpatient (colonoscopy, PSA testing after discussion of risk versus benefit with the patient)    Regarding further anticoagulation management and prescription, patient wishes to follow-up with his primary care physician going forward. Lab studies and imaging studies (CT/ultrasound studies) were personally reviewed. Pertinent old records were reviewed from [address]. All questions were answered to the best of my abilities. Thank you very much for the kind consultation. Please feel free to reach out with any questions.     Elias Bal MD  target organ damage  8:10 AM

## 2022-10-15 NOTE — ED PROVIDER NOTES
[facility] Emergency Department Provider Note                   PCP:                Tiffanie Walter MD               Age: 56 y.o. Sex: male       ICD-10-CM    1. Acute deep vein thrombosis (DVT) of left lower extremity, unspecified vein (HCC)  I82.402       2. Urinary tract infection in male  N39.0           DISPOSITION Decision To Admit 10/15/2022 02:14:09 PM        MDM  13year-old male in the ED with left leg pain and swelling, lower abdominal pain. As in HPI. With unilateral swelling and pitting edema of the left leg, there is tenderness of the upper leg. Intact distal pulses, normal skin color and temperature. Lungs are clear to auscultation, abdomen is soft without guarding rebound or rigidity, though there is some bilateral lower abdominal tenderness. No flank or CVA tenderness. Neurovascularly intact. Endorsing worsening mobility in the last week. Taking reported Bactrim for UTI without change in symptoms.  exam as below. Chaperoned exam.  Patient is accompanied by his brother in the ED. Denies falls or injury, fever chills, weight loss, night sweats, saddle anesthesia, rectal sphincter laxity, other. UA reassuring, labs reviewed as below. Obtained CT scan of the abdomen with findings as below, findings that are suspicious for urinary infection. Ultrasound revealed extensive DVTs at left leg. Discussed ED attending and discussed the patient with hospitalist requesting admission. I have ordered heparin infusion, patient admitted by hospitalist, thank you. I have informed patient and brother of all findings, plan of care and they are agreeable.     Orders Placed This Encounter   Procedures    CT ABDOMEN PELVIS W IV CONTRAST Additional Contrast? None    CBC    CMP    Brain Natriuretic Peptide    POCT Urine Dipstick    Insert peripheral IV STAT    Vascular duplex lower extremity venous left        Christopher Diamond is a 56 y.o. male who presents to the Emergency Department with chief complaint of    Chief Complaint   Patient presents with    Leg Pain    Urinary Pain      HPI  61-year-old male in the ED with complaints of left upper and lower leg pain, swelling, inability to ambulate; left inguinal pain, lower abdominal pain. States symptoms began last Monday and have increasing and in their severity since onset.  is being treated by his PCP with Bactrim with no improvement in symptoms. States he had some testicular pain earlier in the week, but denies testicular pain, scrotal pain or other  complaint at this time.  ambulation has difficulty at baseline due to his peripheral neuropathy, but in the last week he has been bound to wheelchair. No fall or injury, no chest pain tightness, difficulty breathing, no cough or hemoptysis. No blood thinner use. Afebrile, no difficulty urinating. Denies fever/chills, change in appetite, weight loss, decreased oral intake, blurred vision, headaches, neck pain/stiffness. Alert & oriented x 3, afebrile, hemodynamically stable, non-toxic appearing, appears in no distress. Medical/surgical/social history reviewed with the patient. Review of Systems  Constitutional: Negative for fever. Negative for appetite change, chills, diaphoresis and unexpected weight change. HENT: As in HPI     Eyes: Negative. Respiratory: As in HPI   Cardiovascular: Negative. GI/: As in HPI      Musculoskeletal: As in HPI   Skin: Negative. Allergic/Immunologic: Negative. Neurological: Negative. History reviewed. No pertinent past medical history. Past Surgical History:   Procedure Laterality Date    HIP SURGERY Left     WRIST SURGERY Right         History reviewed. No pertinent family history. Social History     Socioeconomic History    Marital status: Single     Spouse name: None    Number of children: None    Years of education: None    Highest education level: None         Patient has no known allergies.      Previous Medications    GABAPENTIN (NEURONTIN) 100 MG CAPSULE    Take 100 mg by mouth 3 times daily. LORAZEPAM (ATIVAN) 1 MG TABLET    Take 1 mg by mouth. Vitals signs and nursing note reviewed. Patient Vitals for the past 4 hrs:   Temp Pulse Resp BP SpO2   10/15/22 1106 97.6 °F (36.4 °C) 98 18 111/71 99 %          Physical Exam   Constitutional: Oriented to person, place, and time. Appears well-developed and well-nourished. No distress. HENT:    Head: Normocephalic and atraumatic. Right Ear: External ear normal.    Left Ear: External ear normal.    Nose: Nose normal.    Mouth/Throat: Mouth normal. Uvula midline, no erythema/exudates/edema. No drooling, no voice change. No pain with ROM of neck. Eyes: Conjunctivae are normal.   Neck: Supple. No tracheal deviation. Not tender, not rigid, no menningeal signs. Cardiovascular: Normal rate, intact distal pulses. Pulmonary/Chest: No respiratory distress. Abdominal: Soft. Mild bilateral lower abdominal tenderness. : Cremasteric reflex intact, no testicular or epididymal tenderness, no scrotal edema or erythema. There is left inguinal tenderness, no palpable mass. Musculoskeletal: No obvious deformity, erythema. + Left lower extremity swelling, tenderness, there is lower extremity pitting edema. Intact DP and PT pulses. Sensation intact. Neurological: Alert and oriented to person, place, and time. Skin:  No abrasion, no lesion, no petechiae and no rash noted. Not diaphoretic. No cyanosis, erythema, or pallor. Psychiatric: Normal mood and affect. Behavior is normal.    Nursing note and vitals reviewed.      Procedures      Labs Reviewed   CBC - Abnormal; Notable for the following components:       Result Value    RBC 3.87 (*)     Hemoglobin 13.0 (*)     Hematocrit 38.2 (*)     MCH 33.6 (*)     All other components within normal limits   COMPREHENSIVE METABOLIC PANEL - Abnormal; Notable for the following components:    Sodium 132 (*)     Chloride 96 (*)     Creatinine 0.71 (*)     Total Bilirubin 1.4 (*)     Alk Phosphatase 190 (*)     Albumin 2.4 (*)     Globulin 5.1 (*)     All other components within normal limits   BRAIN NATRIURETIC PEPTIDE   APTT   PROTIME-INR   ANTI-XA, UNFRACTIONATED HEPARIN   ANTI-XA, UNFRACTIONATED HEPARIN        CT ABDOMEN PELVIS W IV CONTRAST Additional Contrast? None   Final Result   1. Infrarenal IVC filter with suggestion of large thrombus burden distal to the   IVC filter and prominent perivascular stranding left greater than right. In   addition, some of the intra device thrombus appears to extend superior/proximal   to the filter. 2.  Findings likely representing cystitis with features concerning for ascending   infection. No CT evidence of pyelonephritis. Vascular duplex lower extremity venous left   Final Result   Extensive left lower extremity DVT. Voice dictation software was used during the making of this note. This software is not perfect and grammatical and other typographical errors may be present. This note has not been completely proofread for errors.          Vincenzo Galaviz, APRN - CNP  10/15/22 6945

## 2022-10-16 LAB
ALBUMIN SERPL-MCNC: 2 G/DL (ref 3.5–5)
ALBUMIN/GLOB SERPL: 0.5 {RATIO} (ref 0.4–1.6)
ALP SERPL-CCNC: 209 U/L (ref 50–136)
ALT SERPL-CCNC: 32 U/L (ref 12–65)
ANION GAP SERPL CALC-SCNC: 6 MMOL/L (ref 2–11)
AST SERPL-CCNC: 53 U/L (ref 15–37)
BASOPHILS # BLD: 0 K/UL (ref 0–0.2)
BASOPHILS NFR BLD: 1 % (ref 0–2)
BILIRUB SERPL-MCNC: 0.7 MG/DL (ref 0.2–1.1)
BUN SERPL-MCNC: 6 MG/DL (ref 6–23)
CALCIUM SERPL-MCNC: 8.7 MG/DL (ref 8.3–10.4)
CHLORIDE SERPL-SCNC: 102 MMOL/L (ref 101–110)
CO2 SERPL-SCNC: 27 MMOL/L (ref 21–32)
CREAT SERPL-MCNC: 0.53 MG/DL (ref 0.8–1.5)
DIFFERENTIAL METHOD BLD: ABNORMAL
EOSINOPHIL # BLD: 0.1 K/UL (ref 0–0.8)
EOSINOPHIL NFR BLD: 1 % (ref 0.5–7.8)
ERYTHROCYTE [DISTWIDTH] IN BLOOD BY AUTOMATED COUNT: 12.6 % (ref 11.9–14.6)
GLOBULIN SER CALC-MCNC: 4.3 G/DL (ref 2.8–4.5)
GLUCOSE SERPL-MCNC: 112 MG/DL (ref 65–100)
HCT VFR BLD AUTO: 34.2 % (ref 41.1–50.3)
HGB BLD-MCNC: 11.9 G/DL (ref 13.6–17.2)
IMM GRANULOCYTES # BLD AUTO: 0 K/UL (ref 0–0.5)
IMM GRANULOCYTES NFR BLD AUTO: 1 % (ref 0–5)
LYMPHOCYTES # BLD: 1.1 K/UL (ref 0.5–4.6)
LYMPHOCYTES NFR BLD: 15 % (ref 13–44)
MCH RBC QN AUTO: 33.7 PG (ref 26.1–32.9)
MCHC RBC AUTO-ENTMCNC: 34.8 G/DL (ref 31.4–35)
MCV RBC AUTO: 96.9 FL (ref 82–102)
MONOCYTES # BLD: 0.8 K/UL (ref 0.1–1.3)
MONOCYTES NFR BLD: 11 % (ref 4–12)
NEUTS SEG # BLD: 5.4 K/UL (ref 1.7–8.2)
NEUTS SEG NFR BLD: 72 % (ref 43–78)
NRBC # BLD: 0 K/UL (ref 0–0.2)
PLATELET # BLD AUTO: 301 K/UL (ref 150–450)
PMV BLD AUTO: 9.3 FL (ref 9.4–12.3)
POTASSIUM SERPL-SCNC: 3.6 MMOL/L (ref 3.5–5.1)
PROT SERPL-MCNC: 6.3 G/DL (ref 6.3–8.2)
RBC # BLD AUTO: 3.53 M/UL (ref 4.23–5.6)
SODIUM SERPL-SCNC: 135 MMOL/L (ref 133–143)
UFH PPP CHRO-ACNC: 0.25 IU/ML (ref 0.3–0.7)
UFH PPP CHRO-ACNC: 0.35 IU/ML (ref 0.3–0.7)
UFH PPP CHRO-ACNC: 0.53 IU/ML (ref 0.3–0.7)
WBC # BLD AUTO: 7.5 K/UL (ref 4.3–11.1)

## 2022-10-16 PROCEDURE — 1100000000 HC RM PRIVATE

## 2022-10-16 PROCEDURE — 6360000002 HC RX W HCPCS: Performed by: EMERGENCY MEDICINE

## 2022-10-16 PROCEDURE — 97535 SELF CARE MNGMENT TRAINING: CPT

## 2022-10-16 PROCEDURE — 97530 THERAPEUTIC ACTIVITIES: CPT

## 2022-10-16 PROCEDURE — 36415 COLL VENOUS BLD VENIPUNCTURE: CPT

## 2022-10-16 PROCEDURE — 85520 HEPARIN ASSAY: CPT

## 2022-10-16 PROCEDURE — 2580000003 HC RX 258: Performed by: STUDENT IN AN ORGANIZED HEALTH CARE EDUCATION/TRAINING PROGRAM

## 2022-10-16 PROCEDURE — 6360000002 HC RX W HCPCS: Performed by: STUDENT IN AN ORGANIZED HEALTH CARE EDUCATION/TRAINING PROGRAM

## 2022-10-16 PROCEDURE — 97165 OT EVAL LOW COMPLEX 30 MIN: CPT

## 2022-10-16 PROCEDURE — 97161 PT EVAL LOW COMPLEX 20 MIN: CPT

## 2022-10-16 PROCEDURE — 6370000000 HC RX 637 (ALT 250 FOR IP): Performed by: FAMILY MEDICINE

## 2022-10-16 PROCEDURE — 6370000000 HC RX 637 (ALT 250 FOR IP): Performed by: STUDENT IN AN ORGANIZED HEALTH CARE EDUCATION/TRAINING PROGRAM

## 2022-10-16 PROCEDURE — 85025 COMPLETE CBC W/AUTO DIFF WBC: CPT

## 2022-10-16 PROCEDURE — 80053 COMPREHEN METABOLIC PANEL: CPT

## 2022-10-16 RX ORDER — MORPHINE SULFATE 2 MG/ML
2 INJECTION, SOLUTION INTRAMUSCULAR; INTRAVENOUS EVERY 4 HOURS PRN
Status: DISCONTINUED | OUTPATIENT
Start: 2022-10-16 | End: 2022-10-17

## 2022-10-16 RX ADMIN — HEPARIN SODIUM 20 UNITS/KG/HR: 10000 INJECTION, SOLUTION INTRAVENOUS at 06:32

## 2022-10-16 RX ADMIN — GABAPENTIN 600 MG: 300 CAPSULE ORAL at 09:08

## 2022-10-16 RX ADMIN — ACETAMINOPHEN 650 MG: 325 TABLET, FILM COATED ORAL at 06:24

## 2022-10-16 RX ADMIN — MORPHINE SULFATE 2 MG: 2 INJECTION, SOLUTION INTRAMUSCULAR; INTRAVENOUS at 10:11

## 2022-10-16 RX ADMIN — HEPARIN SODIUM 19.96 UNITS/KG/HR: 10000 INJECTION, SOLUTION INTRAVENOUS at 20:49

## 2022-10-16 RX ADMIN — LORAZEPAM 1 MG: 1 TABLET ORAL at 17:31

## 2022-10-16 RX ADMIN — SODIUM CHLORIDE, PRESERVATIVE FREE 10 ML: 5 INJECTION INTRAVENOUS at 20:11

## 2022-10-16 RX ADMIN — GABAPENTIN 600 MG: 300 CAPSULE ORAL at 13:28

## 2022-10-16 RX ADMIN — CEFTRIAXONE 1000 MG: 1 INJECTION, POWDER, FOR SOLUTION INTRAMUSCULAR; INTRAVENOUS at 14:40

## 2022-10-16 RX ADMIN — HEPARIN SODIUM 3630 UNITS: 1000 INJECTION INTRAVENOUS; SUBCUTANEOUS at 06:16

## 2022-10-16 RX ADMIN — GABAPENTIN 600 MG: 300 CAPSULE ORAL at 20:11

## 2022-10-16 RX ADMIN — ACETAMINOPHEN 650 MG: 325 TABLET, FILM COATED ORAL at 16:40

## 2022-10-16 RX ADMIN — HEPARIN SODIUM 19.96 UNITS/KG/HR: 10000 INJECTION, SOLUTION INTRAVENOUS at 19:01

## 2022-10-16 ASSESSMENT — PAIN DESCRIPTION - ORIENTATION
ORIENTATION: LEFT
ORIENTATION: LEFT

## 2022-10-16 ASSESSMENT — PAIN DESCRIPTION - LOCATION
LOCATION: LEG
LOCATION: LEG

## 2022-10-16 ASSESSMENT — PAIN SCALES - GENERAL
PAINLEVEL_OUTOF10: 7
PAINLEVEL_OUTOF10: 2

## 2022-10-16 ASSESSMENT — PAIN DESCRIPTION - DESCRIPTORS: DESCRIPTORS: SORE

## 2022-10-16 NOTE — PROGRESS NOTES
ACUTE PHYSICAL THERAPY GOALS:   (Developed with and agreed upon by patient and/or caregiver. LTG:  (1.)Mr. Deshawn Delcid will move from supine to sit and sit to supine  in bed with INDEPENDENT within 5 treatment day(s). (2.)Mr. Dsehawn Delcid will transfer from bed to chair and chair to bed with MODIFIED INDEPENDENCE using the least restrictive device within 5 treatment day(s). (3.)Mr. Antoine will ambulate with MODIFIED INDEPENDENCE for 200 feet with the least restrictive device within 5 treatment day(s). ________________________________________________________________________________________________      PHYSICAL THERAPY Initial Assessment and AM  (Link to Caseload Tracking: PT Visit Days : 1  Acknowledge Orders  Time In/Out  PT Charge Capture  Rehab Caseload Tracker    Clearance Herminio is a 56 y.o. male   PRIMARY DIAGNOSIS: Acute deep vein thrombosis (DVT) of popliteal vein of left lower extremity (HCC)  Acute deep vein thrombosis (DVT) of popliteal vein of left lower extremity (HCC) [I82.432]  Urinary tract infection in male [N39.0]  Acute deep vein thrombosis (DVT) of left lower extremity, unspecified vein ([address] [I82.402]       Reason for Referral: Generalized Muscle Weakness (M62.81)  Difficulty in walking, Not elsewhere classified (R26.2)  Inpatient: Payor: Wilson Cruz / Plan: [facility] MEDICAID / Product Type: *No Product type* /     ASSESSMENT:     REHAB RECOMMENDATIONS:   Recommendation to date pending progress:  Setting:  No further skilled therapy after discharge from hospital    Equipment:    None     ASSESSMENT:  Mr. Deshawn Delcid admitted with above diagnoses. Patient demonstrating decreased strength, balance, and mobility, and increased pain affecting tolerance for activity. He would benefit from therapy to address these deficits prior to d/c home. No therapy or DME needs anticipated at time of d/c.   He is modified independent at baseline and expect he will return to that level as medical issues (Comments):   AROM []  Decreased but functional   PROM []    Strength []  Decreased but functional   Balance [] Sitting - Static: Good  Sitting - Dynamic: Good  Standing - Static: Fair  Standing - Dynamic: Fair   Posture [] N/A   Sensation [] Overall Sensation Status: Impaired; neuropathy B Les distal from ankles   Coordination []   Decreased but functional   Tone [x]     Edema [] L LE swelling   Activity Tolerance [] Patient limited by pain    []      COGNITION/  PERCEPTION: Intact Impaired (Comments):   Orientation [x]     Vision [x]     Hearing [x]     Cognition  [x]       MOBILITY: I Mod I S SBA CGA Min Mod Max Total  NT x2 Comments:   Bed Mobility    Rolling [] [] [] [] [] [] [] [] [] [] []    Supine to Sit [] [] [x] [] [] [] [] [] [] [] []    Scooting [] [] [x] [] [] [] [] [] [] [] []    Sit to Supine [] [] [] [] [] [] [] [] [] [] []    Transfers    Sit to Stand [] [] [] [x] [] [] [] [] [] [] []    Bed to Chair [] [] [] [] [] [] [] [] [] [] []    Stand to Sit [] [] [] [x] [] [] [] [] [] [] []     [] [] [] [] [] [] [] [] [] [] []    I=Independent, Mod I=Modified Independent, S=Supervision, SBA=Standby Assistance, CGA=Contact Guard Assistance,   Min=Minimal Assistance, Mod=Moderate Assistance, Max=Maximal Assistance, Total=Total Assistance, NT=Not Tested    GAIT: I Mod I S SBA CGA Min Mod Max Total  NT x2 Comments:   Level of Assistance [] [] [] [] [x] [] [] [] [] [] []    Distance 60 feet    DME Rolling Walker    Gait Quality Decreased tricia , Decreased step length, Trunk sway increased, and Wide base of support    Weightbearing Status      Stairs      I=Independent, Mod I=Modified Independent, S=Supervision, SBA=Standby Assistance, CGA=Contact Guard Assistance,   Min=Minimal Assistance, Mod=Moderate Assistance, Max=Maximal Assistance, Total=Total Assistance, NT=Not Tested    PLAN:   FREQUENCY AND DURATION: Daily for duration of hospital stay or until stated goals are met, whichever comes first.    THERAPY PROGNOSIS: Good    PROBLEM LIST:   (Skilled intervention is medically necessary to address:)  Decreased ADL/Functional Activities  Decreased Activity Tolerance  Decreased AROM/PROM  Decreased Balance  Decreased Gait Ability  Decreased Strength  Decreased Transfer Abilities  Increased Pain INTERVENTIONS PLANNED:   (Benefits and precautions of physical therapy have been discussed with the patient.)  Therapeutic Activity  Therapeutic Exercise/HEP  Neuromuscular Re-education  Gait Training  Education       TREATMENT:   EVALUATION: LOW COMPLEXITY: (Untimed Charge)    TREATMENT:   Therapeutic Activity (15 Minutes): Therapeutic activity included Supine to Sit, Scooting, Transfer Training, Ambulation on level ground, Sitting balance , and Standing balance to improve functional Activity tolerance, Balance, Mobility, and Strength.     TREATMENT GRID:  N/A    AFTER TREATMENT PRECAUTIONS: Bed/Chair Locked, Call light within reach, Needs within reach, and sitting edge of be    INTERDISCIPLINARY COLLABORATION:  RN/ PCT and OT/ MENJIVAR    EDUCATION: Education Given To: Patient  Education Provided: Role of Therapy;Plan of Care  Education Method: Verbal  Education Outcome: Verbalized understanding    TIME IN/OUT:  Time In: 1010  Time Out: [address],[address]  Minutes: Kamala [address] 63, PT

## 2022-10-16 NOTE — PROGRESS NOTES
ACUTE OCCUPATIONAL THERAPY GOALS:   (Developed with and agreed upon by patient and/or caregiver.)  1. Patient will perform grooming with supervision. 2. Patient will perform upper body dressing with supervision. 3. Patient will perform lower body dressing with minimal assist.  4. Patient will perform bathing with supervision. 5. Patient will perform toileting and toilet transfer with supervision. 6. Patient will perform ADL functional mobility and tranfers in room with supervision. 7. Patient/family to demonstrate knowledge of home safety and DME recommendations. Goals to be achieved in 7 days. OCCUPATIONAL THERAPY Initial Assessment and AM       OT Visit Days: 1  Acknowledge Orders  Time  OT Charge Capture  Rehab Caseload Tracker      Aman Ramsey is a 56 y.o. male   PRIMARY DIAGNOSIS: Acute deep vein thrombosis (DVT) of popliteal vein of left lower extremity (HCC)  Acute deep vein thrombosis (DVT) of popliteal vein of left lower extremity (HCC) [I82.432]  Urinary tract infection in male [N39.0]  Acute deep vein thrombosis (DVT) of left lower extremity, unspecified vein ([facility] [address] [I82.402]       Reason for Referral: Generalized Muscle Weakness (M62.81)  Other lack of cordination (R27.8)  Difficulty in walking, Not elsewhere classified (R26.2)  Inpatient: Payor: Mony Turpin / Plan: Carson Méndez [facility] MEDICAID / Product Type: *No Product type* /     ASSESSMENT:     REHAB RECOMMENDATIONS:   Recommendation to date pending progress:  Setting:  No further skilled therapy after discharge from hospital    Equipment:    None     ASSESSMENT:  Mr. Domenica Simmons admitted with above diagnosis. Pt presents with decreased self care and functional mobility. Pt would benefit from skilled OT to increase independence. No therapy needs after d/c. Pt donned right shoe at edge of bed. Therapist assist with left shoe. Pt ambulated household distance in room and hallway with CGA. Pt returned to edge of bed.  Pt left with needs in reach. Will continue therapy.       [address] [facility] [address] [id number] AM-PAC 6 Clicks Daily Activity Inpatient Short Form:    AM-PAC Daily Activity Inpatient   How much help for putting on and taking off regular lower body clothing?: A Little  How much help for Bathing?: A Little  How much help for Toileting?: None  How much help for putting on and taking off regular upper body clothing?: None  How much help for taking care of personal grooming?: None  How much help for eating meals?: None  AM-[facility] Inpatient Daily Activity Raw Score: 22  AM-PAC Inpatient ADL T-Scale Score : 47.1  ADL Inpatient CMS 0-100% Score: 25.8  ADL Inpatient CMS G-Code Modifier : CJ           SUBJECTIVE:     Mr. Juan Daniel Barraza states, \"I have neuropathy\"     Social/Functional Lives With: Alone  Type of Home:  (Extended Stay-handicap room)  Home Layout: One level  Home Access: Level entry  Bathroom Shower/Tub: Tub/Shower unit  Bathroom Toilet: Handicap height  Bathroom Equipment: Shower chair  Home Equipment: Sarina Paetl, 4 wheeled  Receives Help From: Family  ADL Assistance: Independent  Ambulation Assistance: Independent (with rollator)  Active : Yes    OBJECTIVE:     Naz Paris / Cheyenne Ram / Daysi Callas: IV and Telemetry     RESTRICTIONS/PRECAUTIONS:       PAIN: VITALS / O2:   Pre Treatment:   Pain Assessment: 0-10  Pain Level: 7  Non-Pharmaceutical Pain Intervention(s): Ambulation/Increased Activity      Post Treatment: 7/10       Vitals          Oxygen            GROSS EVALUATION: INTACT IMPAIRED   (See Comments)   UE AROM [] []Decreased but functional    UE PROM [] []   Strength []  Decreased but functional      Posture / Balance [] Sitting - Static: Good  Sitting - Dynamic: Good  Standing - Static: Fair  Standing - Dynamic: Fair   Sensation [x]  UEs   Coordination [x]       Tone [x]       Edema []    Activity Tolerance []  Limited by pain     Hand Dominance R [x] L []      COGNITION/  PERCEPTION: INTACT IMPAIRED   (See Comments)   Orientation [x]     Vision [x] Hearing [x]     Cognition  [x]     Perception [x]       MOBILITY: I Mod I S SBA CGA Min Mod Max Total  NT x2 Comments:   Bed Mobility    Rolling [] [] [] [] [] [] [] [] [] [] []    Supine to Sit [] [] [x] [] [] [] [] [] [] [] []    Scooting [] [] [x] [] [] [] [] [] [] [] []    Sit to Supine [] [] [] [] [] [] [] [] [] [] []    Transfers    Sit to Stand [] [] [] [x] [] [] [] [] [] [] []    Bed to Chair [] [] [] [] [] [] [] [] [] [] []    Stand to Sit [] [] [] [x] [] [] [] [] [] [] []    Tub/Shower [] [] [] [] [] [] [] [] [] [] []     Toilet [] [] [] [] [] [] [] [] [] [] []      [] [] [] [] [] [] [] [] [] [] []    I=Independent, Mod I=Modified Independent, S=Supervision/Setup, SBA=Standby Assistance, CGA=Contact Guard Assistance, Min=Minimal Assistance, Mod=Moderate Assistance, Max=Maximal Assistance, Total=Total Assistance, NT=Not Tested    ACTIVITIES OF DAILY LIVING: I Mod I S SBA CGA Min Mod Max Total NT Comments   BASIC ADLs:              Upper Body Bathing  [] [] [] [] [] [] [] [] [] [x]    Lower Body Bathing [] [] [] [] [] [] [] [] [] [x]    Toileting [] [] [] [] [] [] [] [] [] [x]    Upper Body Dressing [] [] [] [] [] [] [] [] [] [x]    Lower Body Dressing [] [] [] [] [] [x] [] [] [] [] Left shoe   Feeding [] [] [x] [] [] [] [] [] [] []    Grooming [] [] [x] [] [] [] [] [] [] []    Personal Device Care [] [] [] [] [] [] [] [] [] []    Functional Mobility [] [] [] [x] [] [] [] [] [] []    I=Independent, Mod I=Modified Independent, S=Supervision/Setup, SBA=Standby Assistance, CGA=Contact Guard Assistance, Min=Minimal Assistance, Mod=Moderate Assistance, Max=Maximal Assistance, Total=Total Assistance, NT=Not Tested    PLAN:   FREQUENCY/DURATION   OT Plan of Care: 3 times/week for duration of hospital stay or until stated goals are met, whichever comes first.    PROBLEM LIST:   (Skilled intervention is medically necessary to address:)  Decreased ADL/Functional Activities  Decreased Activity Tolerance  Decreased Balance  Decreased Strength  Decreased Transfer Abilities   INTERVENTIONS PLANNED:  (Benefits and precautions of occupational therapy have been discussed with the patient.)  Self Care Training  Therapeutic Activity  Therapeutic Exercise/HEP  Neuromuscular Re-education  Manual Therapy  Education         TREATMENT:     EVALUATION: LOW COMPLEXITY: (Untimed Charge)    TREATMENT:   Self Care (15 minutes): Patient participated in lower body dressing, self feeding, and grooming ADLs in unsupported sitting and standing with minimal verbal cueing to increase independence and increase activity tolerance. Patient also participated in functional mobility and functional transfer training to increase activity tolerance.      TREATMENT GRID:  N/A    AFTER TREATMENT PRECAUTIONS: Bed, Bed/Chair Locked, Call light within reach, Needs within reach, and Side rails x3    INTERDISCIPLINARY COLLABORATION:  RN/ PCT, PT/ PTA, and OT/ MENJIVAR    EDUCATION:  Education Given To: Patient  Education Provided: Role of Therapy  Education Method: Demonstration  Barriers to Learning: None  Education Outcome: Verbalized understanding;Demonstrated understanding    TOTAL TREATMENT DURATION AND TIME:  Time In: 1010  Time Out: 56  Minutes: 27[address] [facility]

## 2022-10-16 NOTE — CARE COORDINATION
56 yr-old M with DVT. Has [facility] Medicaid. Lives alone. Was independent with a walker but then started having swelling and edema of LLE which caused problems getting around the house. He plans to return home at discharge. He did not want me to arrange for [facility] at this time, preferring to wait to see if his leg gets better. SW to follow. 10/16/22 7440   Service Assessment   Patient Orientation Alert and Oriented   Cognition Alert   History Provided By Patient   Primary [address] is: Legal Next of Kin   PCP Verified by CM Yes   Last Visit to PCP Within last 3 months   Prior Functional Level   (Was independent with a walker about a week before he started having problems with his leg)   Current Functional Level Assistance with the following:;Bathing;Dressing; Toileting;Mobility   Can patient return to prior living arrangement Unknown at present   Ability to make needs known: Good   Family able to assist with home care needs: Other (comment)   Would you like for me to discuss the discharge plan with any other family members/significant others, and if so, who?  No   Financial Resources Medicare   Social/Functional History   Lives With Alone   Discharge Planning   Patient expects to be discharged to: [facility]

## 2022-10-16 NOTE — PROGRESS NOTES
Hospitalist Progress Note   Admit Date:  10/15/2022 11:25 AM   Name:  Noam Zendejas   Age:  56 y.o. Sex:  male  :  1965   MRN:  [id number]   Room:  [facility]/    Presenting Complaint: Leg Pain and Urinary Pain     Reason(s) for Admission: Acute deep vein thrombosis (DVT) of popliteal vein of left lower extremity (HCC) [I82.432]  Urinary tract infection in male [N39.0]  Acute deep vein thrombosis (DVT) of left lower extremity, unspecified vein ([address] [I82.402]     Hospital Course:   56 y.o. male with medical history of polycythemia vera, IVC filter placement who presented with unilateral swelling and edema of his left lower extremity and tenderness to his left thigh. Patient stated that symptoms started worsening over the past few days. Patient states that he had worsening ability to get around the house. Patient was just recently given a prescription of Bactrim for urinary tract infection but stated he still complains of similar symptoms. Patient is very poor historian. When asked about IVC filter patient was unaware he had one placed. No recollection of past medical history including polycythemia vera. CT of abdomen suspicious for cystitis. Ultrasound showed extensive DVTs of left lower extremity. Infrarenal IVC filter with suggestion of large thrombus burden distal to the IVC filter some intra device thrombus appears to extend superior/proximal to the filter. Patient denied any cardiac chest pain, shortness of breath, abdominal pain, fever/chills. Subjective & 24hr Events (10/16/22): Patient seen and examined at the bedside. Patient still with some pain to left leg. Patient denies any cardiac chest pain, shortness of breath, abdominal pain, fever/chills.       Assessment & Plan:   Acute deep vein thrombosis (DVT) of popliteal vein of left lower extremity (HCC)  -As evidenced on lower extremity Doppler  - Patient started on heparin drip  - Hematology consulted  - Continue to monitor platelet count daily  - Patient's vital stable, patient not in any respiratory distress  - Patient will need indefinite anticoagulation  - Per oncology if beta-2 microglobulin and anticardiolipin antibodies negative then patient can be discharged on Eliquis/Xarelto    Secondary erythrocytosis  - Oncology consulted, appreciate recommendations  - JAK2 negative    IVC filter clot burden  - IR consulted, appreciate recommendations  - Continue heparin    Peripheral neuropathy  - Continue home gabapentin    Anxiety  - Continue home Ativan      Anticipated discharge needs:    Dispo pending clinical course. Patient will need age-appropriate cancer screening as outpatient, colonoscopy, PSA    Diet:  ADULT DIET; Regular; 3 carb choices (45 gm/meal); Low Fat/Low Chol/High Fiber/2 gm Na  DVT PPx: Heparin drip  Code status: Full Code    Hospital Problems:  Principal Problem:    Acute deep vein thrombosis (DVT) of popliteal vein of left lower extremity (HCC)  Resolved Problems:    * No resolved hospital problems. *      Objective:   Patient Vitals for the past 24 hrs:   Temp Pulse Resp BP SpO2   10/16/22 1210 97.3 °F (36.3 °C) 81 16 127/88 100 %   10/16/22 0735 98.8 °F (37.1 °C) 78 17 106/71 97 %   10/16/22 0332 99 °F (37.2 °C) 79 17 (!) 147/93 100 %   10/15/22 2333 98.8 °F (37.1 °C) 70 17 115/73 94 %   10/15/22 2031 -- (!) 18 16 -- 96 %   10/15/22 2020 (!) 100.8 °F (38.2 °C) 78 18 120/74 97 %   10/15/22 1720 98.2 °F (36.8 °C) 95 16 118/84 98 %   10/15/22 1647 -- -- -- -- 99 %   10/15/22 1644 -- 98 17 110/70 99 %       Oxygen Therapy  SpO2: 100 %  Pulse Oximeter Device Mode: Intermittent  O2 Device: None (Room air)    Estimated body mass index is 24.34 kg/m² as calculated from the following:    Height as of this encounter: 6' 4\" (1.93 m). Weight as of this encounter: 200 lb (90.7 kg).     Intake/Output Summary (Last 24 hours) at 10/16/2022 1833  Last data filed at 10/16/2022 1255  Gross per 24 hour   Intake 720 ml   Output ALT 28 12 - 65 U/L    AST 34 15 - 37 U/L    Alk Phosphatase 190 (H) 50 - 136 U/L    Total Protein 7.5 6.3 - 8.2 g/dL    Albumin 2.4 (L) 3.5 - 5.0 g/dL    Globulin 5.1 (H) 2.8 - 4.5 g/dL    Albumin/Globulin Ratio 0.5 0.4 - 1.6     Brain Natriuretic Peptide    Collection Time: 10/15/22 11:17 AM   Result Value Ref Range    NT Pro-BNP 55 5 - 125 PG/ML   APTT    Collection Time: 10/15/22  3:57 PM   Result Value Ref Range    PTT 34.3 24.1 - 35.1 SEC   Protime-INR    Collection Time: 10/15/22  3:57 PM   Result Value Ref Range    Protime 15.0 (H) 12.6 - 14.5 sec    INR 1.1     Anti-Xa, Unfractionated Heparin    Collection Time: 10/15/22  9:31 PM   Result Value Ref Range    Anti-XA Unfrac Heparin 0.45 0.3 - 0.7 IU/mL   APTT    Collection Time: 10/15/22  9:31 PM   Result Value Ref Range    .7 (H) 24.1 - 35.1 SEC   CBC    Collection Time: 10/15/22  9:31 PM   Result Value Ref Range    WBC 7.9 4.3 - 11.1 K/uL    RBC 3.42 (L) 4.23 - 5.6 M/uL    Hemoglobin 11.7 (L) 13.6 - 17.2 g/dL    Hematocrit 33.2 (L) 41.1 - 50.3 %    MCV 97.1 82.0 - 102.0 FL    MCH 34.2 (H) 26.1 - 32.9 PG    MCHC 35.2 (H) 31.4 - 35.0 g/dL    RDW 12.3 11.9 - 14.6 %    Platelets 140 906 - 606 K/uL    MPV 9.4 9.4 - 12.3 FL    nRBC 0.00 0.0 - 0.2 K/uL   Urinalysis, Micro    Collection Time: 10/15/22 10:58 PM   Result Value Ref Range    WBC, UA 0-3 0 /hpf    RBC, UA 0-3 0 /hpf    Epithelial Cells UA 0-3 0 /hpf    BACTERIA, URINE 0 0 /hpf    Casts 0 0 /lpf    Crystals 0 0 /LPF    Mucus, UA 0 0 /lpf   Anti-Xa, Unfractionated Heparin    Collection Time: 10/16/22  4:51 AM   Result Value Ref Range    Anti-XA Unfrac Heparin 0.25 (L) 0.3 - 0.7 IU/mL   CBC with Auto Differential    Collection Time: 10/16/22  4:51 AM   Result Value Ref Range    WBC 7.5 4.3 - 11.1 K/uL    RBC 3.53 (L) 4.23 - 5.6 M/uL    Hemoglobin 11.9 (L) 13.6 - 17.2 g/dL    Hematocrit 34.2 (L) 41.1 - 50.3 %    MCV 96.9 82.0 - 102.0 FL    MCH 33.7 (H) 26.1 - 32.9 PG    MCHC 34.8 31.4 - 35.0 g/dL RDW 12.6 11.9 - 14.6 %    Platelets 470 811 - 246 K/uL    MPV 9.3 (L) 9.4 - 12.3 FL    nRBC 0.00 0.0 - 0.2 K/uL    Differential Type AUTOMATED      Seg Neutrophils 72 43 - 78 %    Lymphocytes 15 13 - 44 %    Monocytes 11 4.0 - 12.0 %    Eosinophils % 1 0.5 - 7.8 %    Basophils 1 0.0 - 2.0 %    Immature Granulocytes 1 0.0 - 5.0 %    Segs Absolute 5.4 1.7 - 8.2 K/UL    Absolute Lymph # 1.1 0.5 - 4.6 K/UL    Absolute Mono # 0.8 0.1 - 1.3 K/UL    Absolute Eos # 0.1 0.0 - 0.8 K/UL    Basophils Absolute 0.0 0.0 - 0.2 K/UL    Absolute Immature Granulocyte 0.0 0.0 - 0.5 K/UL   Comprehensive Metabolic Panel w/ Reflex to MG    Collection Time: 10/16/22  4:51 AM   Result Value Ref Range    Sodium 135 133 - 143 mmol/L    Potassium 3.6 3.5 - 5.1 mmol/L    Chloride 102 101 - 110 mmol/L    CO2 27 21 - 32 mmol/L    Anion Gap 6 2 - 11 mmol/L    Glucose 112 (H) 65 - 100 mg/dL    BUN 6 6 - 23 MG/DL    Creatinine 0.53 (L) 0.8 - 1.5 MG/DL    Est, Glom Filt Rate >60 >60 ml/min/1.73m2    Calcium 8.7 8.3 - 10.4 MG/DL    Total Bilirubin 0.7 0.2 - 1.1 MG/DL    ALT 32 12 - 65 U/L    AST 53 (H) 15 - 37 U/L    Alk Phosphatase 209 (H) 50 - 136 U/L    Total Protein 6.3 6.3 - 8.2 g/dL    Albumin 2.0 (L) 3.5 - 5.0 g/dL    Globulin 4.3 2.8 - 4.5 g/dL    Albumin/Globulin Ratio 0.5 0.4 - 1.6     Anti-Xa, Unfractionated Heparin    Collection Time: 10/16/22 12:33 PM   Result Value Ref Range    Anti-XA Unfrac Heparin 0.53 0.3 - 0.7 IU/mL       I have personally reviewed imaging studies showing: Other Studies:  CT ABDOMEN PELVIS W IV CONTRAST Additional Contrast? None   Final Result   1. Infrarenal IVC filter with suggestion of large thrombus burden distal to the   IVC filter and prominent perivascular stranding left greater than right. In   addition, some of the intra device thrombus appears to extend superior/proximal   to the filter. 2.  Findings likely representing cystitis with features concerning for ascending   infection.  No CT evidence of pyelonephritis. Vascular duplex lower extremity venous left   Final Result   Extensive left lower extremity DVT. Current Meds:  Current Facility-Administered Medications   Medication Dose Route Frequency    morphine injection 2 mg  2 mg IntraVENous Q4H PRN    heparin (porcine) injection 7,260 Units  80 Units/kg IntraVENous PRN    heparin (porcine) injection 3,630 Units  40 Units/kg IntraVENous PRN    heparin 25,000 units in dextrose 5% 250 mL (premix) infusion  5-30 Units/kg/hr IntraVENous Continuous    LORazepam (ATIVAN) tablet 1 mg  1 mg Oral Daily PRN    sodium chloride flush 0.9 % injection 5-40 mL  5-40 mL IntraVENous 2 times per day    sodium chloride flush 0.9 % injection 5-40 mL  5-40 mL IntraVENous PRN    0.9 % sodium chloride infusion   IntraVENous PRN    ondansetron (ZOFRAN-ODT) disintegrating tablet 4 mg  4 mg Oral Q8H PRN    Or    ondansetron (ZOFRAN) injection 4 mg  4 mg IntraVENous Q6H PRN    polyethylene glycol (GLYCOLAX) packet 17 g  17 g Oral Daily PRN    acetaminophen (TYLENOL) tablet 650 mg  650 mg Oral Q6H PRN    Or    acetaminophen (TYLENOL) suppository 650 mg  650 mg Rectal Q6H PRN    magnesium sulfate 2000 mg in 50 mL IVPB premix  2,000 mg IntraVENous PRN    potassium chloride 10 mEq/100 mL IVPB (Peripheral Line)  10 mEq IntraVENous PRN    potassium chloride (KLOR-CON M) extended release tablet 40 mEq  40 mEq Oral PRN    Or    potassium bicarb-citric acid (EFFER-K) effervescent tablet 40 mEq  40 mEq Oral PRN    Or    potassium chloride 10 mEq/100 mL IVPB (Peripheral Line)  10 mEq IntraVENous PRN    cefTRIAXone (ROCEPHIN) 1,000 mg in sodium chloride 0.9 % 50 mL IVPB mini-bag  1,000 mg IntraVENous Q24H    gabapentin (NEURONTIN) capsule 600 mg  600 mg Oral TID       Signed:  Jamari Delarosa MD    Part of this note may have been written by using a voice dictation software. The note has been proof read but may still contain some grammatical/other typographical errors.

## 2022-10-17 ENCOUNTER — HOSPITAL ENCOUNTER (INPATIENT)
Dept: INTERVENTIONAL RADIOLOGY/VASCULAR | Age: 57
LOS: 5 days | Discharge: HOME HEALTH | DRG: 272 | End: 2022-10-22
Attending: INTERNAL MEDICINE | Admitting: INTERNAL MEDICINE
Payer: MEDICAID

## 2022-10-17 VITALS
HEART RATE: 82 BPM | BODY MASS INDEX: 24.36 KG/M2 | SYSTOLIC BLOOD PRESSURE: 129 MMHG | TEMPERATURE: 99.8 F | OXYGEN SATURATION: 98 % | DIASTOLIC BLOOD PRESSURE: 87 MMHG | RESPIRATION RATE: 18 BRPM | WEIGHT: 200 LBS | HEIGHT: 76 IN

## 2022-10-17 DIAGNOSIS — I82.499 DEEP VEIN THROMBOSIS (DVT) OF OTHER VEIN OF LOWER EXTREMITY, UNSPECIFIED CHRONICITY, UNSPECIFIED LATERALITY (HCC): ICD-10-CM

## 2022-10-17 PROBLEM — I82.401 ACUTE DEEP VEIN THROMBOSIS (DVT) OF RIGHT LOWER EXTREMITY, UNSPECIFIED VEIN (HCC): Status: ACTIVE | Noted: 2022-10-17

## 2022-10-17 LAB
ERYTHROCYTE [DISTWIDTH] IN BLOOD BY AUTOMATED COUNT: 12.6 %
ERYTHROCYTE [DISTWIDTH] IN BLOOD BY AUTOMATED COUNT: 12.6 % (ref 11.9–14.6)
HCT VFR BLD AUTO: 33.9 % (ref 41.1–50.3)
HCT VFR BLD AUTO: 36.1 % (ref 41.1–50.3)
HGB BLD-MCNC: 11.7 G/DL (ref 13.6–17.2)
HGB BLD-MCNC: 12.3 G/DL (ref 13.6–17.2)
MCH RBC QN AUTO: 33.9 PG (ref 26.1–32.9)
MCH RBC QN AUTO: 33.9 PG (ref 26.1–32.9)
MCHC RBC AUTO-ENTMCNC: 34.1 G/DL (ref 31.4–35)
MCHC RBC AUTO-ENTMCNC: 34.5 G/DL (ref 31.4–35)
MCV RBC AUTO: 98.3 FL (ref 82–102)
MCV RBC AUTO: 99.4 FL (ref 82–102)
NRBC # BLD: 0 K/UL
NRBC # BLD: 0 K/UL (ref 0–0.2)
PLATELET # BLD AUTO: 366 K/UL
PLATELET # BLD AUTO: 375 K/UL (ref 150–450)
PMV BLD AUTO: 9.2 FL (ref 9.4–12.3)
PMV BLD AUTO: 9.6 FL (ref 9.4–12.3)
RBC # BLD AUTO: 3.45 M/UL (ref 4.23–5.6)
RBC # BLD AUTO: 3.63 M/UL
UFH PPP CHRO-ACNC: 0.3 IU/ML (ref 0.3–0.7)
WBC # BLD AUTO: 5.6 K/UL (ref 4.3–11.1)
WBC # BLD AUTO: 6.8 K/UL (ref 4.3–11.1)

## 2022-10-17 PROCEDURE — 6360000004 HC RX CONTRAST MEDICATION: Performed by: RADIOLOGY

## 2022-10-17 PROCEDURE — 85520 HEPARIN ASSAY: CPT

## 2022-10-17 PROCEDURE — 36415 COLL VENOUS BLD VENIPUNCTURE: CPT

## 2022-10-17 PROCEDURE — 6360000002 HC RX W HCPCS: Performed by: EMERGENCY MEDICINE

## 2022-10-17 PROCEDURE — B51C1ZZ FLUOROSCOPY OF LEFT LOWER EXTREMITY VEINS USING LOW OSMOLAR CONTRAST: ICD-10-PCS | Performed by: RADIOLOGY

## 2022-10-17 PROCEDURE — 85384 FIBRINOGEN ACTIVITY: CPT

## 2022-10-17 PROCEDURE — 6370000000 HC RX 637 (ALT 250 FOR IP): Performed by: FAMILY MEDICINE

## 2022-10-17 PROCEDURE — 6360000002 HC RX W HCPCS: Performed by: RADIOLOGY

## 2022-10-17 PROCEDURE — 85027 COMPLETE CBC AUTOMATED: CPT

## 2022-10-17 PROCEDURE — 85730 THROMBOPLASTIN TIME PARTIAL: CPT

## 2022-10-17 PROCEDURE — B51G1ZZ FLUOROSCOPY OF LEFT PELVIC (ILIAC) VEINS USING LOW OSMOLAR CONTRAST: ICD-10-PCS | Performed by: RADIOLOGY

## 2022-10-17 PROCEDURE — 2500000003 HC RX 250 WO HCPCS: Performed by: RADIOLOGY

## 2022-10-17 PROCEDURE — 2580000003 HC RX 258: Performed by: RADIOLOGY

## 2022-10-17 PROCEDURE — C1894 INTRO/SHEATH, NON-LASER: HCPCS

## 2022-10-17 PROCEDURE — 6370000000 HC RX 637 (ALT 250 FOR IP): Performed by: STUDENT IN AN ORGANIZED HEALTH CARE EDUCATION/TRAINING PROGRAM

## 2022-10-17 PROCEDURE — 6360000002 HC RX W HCPCS: Performed by: STUDENT IN AN ORGANIZED HEALTH CARE EDUCATION/TRAINING PROGRAM

## 2022-10-17 PROCEDURE — 2100000000 HC CCU R&B

## 2022-10-17 PROCEDURE — 2580000003 HC RX 258: Performed by: STUDENT IN AN ORGANIZED HEALTH CARE EDUCATION/TRAINING PROGRAM

## 2022-10-17 PROCEDURE — 3E04317 INTRODUCTION OF OTHER THROMBOLYTIC INTO CENTRAL VEIN, PERCUTANEOUS APPROACH: ICD-10-PCS | Performed by: RADIOLOGY

## 2022-10-17 PROCEDURE — B5191ZZ FLUOROSCOPY OF INFERIOR VENA CAVA USING LOW OSMOLAR CONTRAST: ICD-10-PCS | Performed by: RADIOLOGY

## 2022-10-17 RX ORDER — FENTANYL CITRATE 50 UG/ML
INJECTION, SOLUTION INTRAMUSCULAR; INTRAVENOUS
Status: COMPLETED | OUTPATIENT
Start: 2022-10-17 | End: 2022-10-17

## 2022-10-17 RX ORDER — SODIUM CHLORIDE 9 MG/ML
INJECTION, SOLUTION INTRAVENOUS PRN
Status: DISCONTINUED | OUTPATIENT
Start: 2022-10-17 | End: 2022-10-22 | Stop reason: HOSPADM

## 2022-10-17 RX ORDER — SODIUM CHLORIDE 9 MG/ML
INJECTION, SOLUTION INTRAVENOUS PRN
Status: CANCELLED | OUTPATIENT
Start: 2022-10-17

## 2022-10-17 RX ORDER — POLYETHYLENE GLYCOL 3350 17 G/17G
17 POWDER, FOR SOLUTION ORAL DAILY PRN
Status: CANCELLED | OUTPATIENT
Start: 2022-10-17

## 2022-10-17 RX ORDER — SODIUM CHLORIDE 0.9 % (FLUSH) 0.9 %
5-40 SYRINGE (ML) INJECTION PRN
Status: CANCELLED | OUTPATIENT
Start: 2022-10-17

## 2022-10-17 RX ORDER — POTASSIUM CHLORIDE 20 MEQ/1
40 TABLET, EXTENDED RELEASE ORAL PRN
Status: DISCONTINUED | OUTPATIENT
Start: 2022-10-17 | End: 2022-10-22 | Stop reason: HOSPADM

## 2022-10-17 RX ORDER — DIPHENHYDRAMINE HYDROCHLORIDE 50 MG/ML
INJECTION INTRAMUSCULAR; INTRAVENOUS
Status: COMPLETED | OUTPATIENT
Start: 2022-10-17 | End: 2022-10-17

## 2022-10-17 RX ORDER — POTASSIUM CHLORIDE 7.45 MG/ML
10 INJECTION INTRAVENOUS PRN
Status: DISCONTINUED | OUTPATIENT
Start: 2022-10-17 | End: 2022-10-22 | Stop reason: HOSPADM

## 2022-10-17 RX ORDER — GABAPENTIN 300 MG/1
600 CAPSULE ORAL 3 TIMES DAILY
Status: DISCONTINUED | OUTPATIENT
Start: 2022-10-17 | End: 2022-10-22 | Stop reason: HOSPADM

## 2022-10-17 RX ORDER — ACETAMINOPHEN 325 MG/1
650 TABLET ORAL EVERY 6 HOURS PRN
Status: CANCELLED | OUTPATIENT
Start: 2022-10-17

## 2022-10-17 RX ORDER — HEPARIN SODIUM 10000 [USP'U]/100ML
5-30 INJECTION, SOLUTION INTRAVENOUS CONTINUOUS
Status: CANCELLED | OUTPATIENT
Start: 2022-10-17 | Stop reason: SDUPTHER

## 2022-10-17 RX ORDER — MAGNESIUM SULFATE IN WATER 40 MG/ML
2000 INJECTION, SOLUTION INTRAVENOUS PRN
Status: DISCONTINUED | OUTPATIENT
Start: 2022-10-17 | End: 2022-10-22 | Stop reason: HOSPADM

## 2022-10-17 RX ORDER — SODIUM CHLORIDE 9 MG/ML
INJECTION, SOLUTION INTRAVENOUS CONTINUOUS PRN
Status: COMPLETED | OUTPATIENT
Start: 2022-10-17 | End: 2022-10-17

## 2022-10-17 RX ORDER — SODIUM CHLORIDE 0.9 % (FLUSH) 0.9 %
5-40 SYRINGE (ML) INJECTION EVERY 12 HOURS SCHEDULED
Status: CANCELLED | OUTPATIENT
Start: 2022-10-17

## 2022-10-17 RX ORDER — LORAZEPAM 1 MG/1
1 TABLET ORAL DAILY PRN
Status: CANCELLED | OUTPATIENT
Start: 2022-10-17

## 2022-10-17 RX ORDER — HEPARIN SODIUM 10000 [USP'U]/100ML
400 INJECTION, SOLUTION INTRAVENOUS CONTINUOUS
Status: DISCONTINUED | OUTPATIENT
Start: 2022-10-17 | End: 2022-10-18

## 2022-10-17 RX ORDER — ACETAMINOPHEN 650 MG/1
650 SUPPOSITORY RECTAL EVERY 6 HOURS PRN
Status: CANCELLED | OUTPATIENT
Start: 2022-10-17

## 2022-10-17 RX ORDER — MIDAZOLAM HYDROCHLORIDE 2 MG/2ML
INJECTION, SOLUTION INTRAMUSCULAR; INTRAVENOUS
Status: COMPLETED | OUTPATIENT
Start: 2022-10-17 | End: 2022-10-17

## 2022-10-17 RX ORDER — SODIUM CHLORIDE 9 MG/ML
INJECTION, SOLUTION INTRAVENOUS CONTINUOUS
Status: DISCONTINUED | OUTPATIENT
Start: 2022-10-17 | End: 2022-10-19

## 2022-10-17 RX ORDER — POLYETHYLENE GLYCOL 3350 17 G/17G
17 POWDER, FOR SOLUTION ORAL DAILY PRN
Status: DISCONTINUED | OUTPATIENT
Start: 2022-10-17 | End: 2022-10-22 | Stop reason: HOSPADM

## 2022-10-17 RX ORDER — ACETAMINOPHEN 650 MG/1
650 SUPPOSITORY RECTAL EVERY 6 HOURS PRN
Status: DISCONTINUED | OUTPATIENT
Start: 2022-10-17 | End: 2022-10-22 | Stop reason: HOSPADM

## 2022-10-17 RX ORDER — SODIUM CHLORIDE 0.9 % (FLUSH) 0.9 %
5-40 SYRINGE (ML) INJECTION PRN
Status: DISCONTINUED | OUTPATIENT
Start: 2022-10-17 | End: 2022-10-22 | Stop reason: HOSPADM

## 2022-10-17 RX ORDER — POTASSIUM CHLORIDE 20 MEQ/1
40 TABLET, EXTENDED RELEASE ORAL PRN
Status: CANCELLED | OUTPATIENT
Start: 2022-10-17

## 2022-10-17 RX ORDER — MAGNESIUM SULFATE IN WATER 40 MG/ML
2000 INJECTION, SOLUTION INTRAVENOUS PRN
Status: CANCELLED | OUTPATIENT
Start: 2022-10-17

## 2022-10-17 RX ORDER — ONDANSETRON 2 MG/ML
4 INJECTION INTRAMUSCULAR; INTRAVENOUS EVERY 6 HOURS PRN
Status: CANCELLED | OUTPATIENT
Start: 2022-10-17

## 2022-10-17 RX ORDER — POTASSIUM CHLORIDE 7.45 MG/ML
10 INJECTION INTRAVENOUS PRN
Status: CANCELLED | OUTPATIENT
Start: 2022-10-17

## 2022-10-17 RX ORDER — MORPHINE SULFATE 2 MG/ML
2 INJECTION, SOLUTION INTRAMUSCULAR; INTRAVENOUS EVERY 4 HOURS PRN
Status: DISCONTINUED | OUTPATIENT
Start: 2022-10-17 | End: 2022-10-22 | Stop reason: HOSPADM

## 2022-10-17 RX ORDER — MORPHINE SULFATE 2 MG/ML
2 INJECTION, SOLUTION INTRAMUSCULAR; INTRAVENOUS EVERY 4 HOURS PRN
Status: CANCELLED | OUTPATIENT
Start: 2022-10-17

## 2022-10-17 RX ORDER — SODIUM CHLORIDE 0.9 % (FLUSH) 0.9 %
5-40 SYRINGE (ML) INJECTION EVERY 12 HOURS SCHEDULED
Status: DISCONTINUED | OUTPATIENT
Start: 2022-10-17 | End: 2022-10-22 | Stop reason: HOSPADM

## 2022-10-17 RX ORDER — ONDANSETRON 4 MG/1
4 TABLET, ORALLY DISINTEGRATING ORAL EVERY 8 HOURS PRN
Status: CANCELLED | OUTPATIENT
Start: 2022-10-17

## 2022-10-17 RX ORDER — LORAZEPAM 1 MG/1
1 TABLET ORAL DAILY PRN
Status: DISCONTINUED | OUTPATIENT
Start: 2022-10-17 | End: 2022-10-18

## 2022-10-17 RX ORDER — ONDANSETRON 4 MG/1
4 TABLET, ORALLY DISINTEGRATING ORAL EVERY 8 HOURS PRN
Status: DISCONTINUED | OUTPATIENT
Start: 2022-10-17 | End: 2022-10-22 | Stop reason: HOSPADM

## 2022-10-17 RX ORDER — HEPARIN SODIUM 1000 [USP'U]/ML
40 INJECTION, SOLUTION INTRAVENOUS; SUBCUTANEOUS PRN
Status: CANCELLED | OUTPATIENT
Start: 2022-10-17 | Stop reason: SDUPTHER

## 2022-10-17 RX ORDER — ONDANSETRON 2 MG/ML
4 INJECTION INTRAMUSCULAR; INTRAVENOUS EVERY 6 HOURS PRN
Status: DISCONTINUED | OUTPATIENT
Start: 2022-10-17 | End: 2022-10-22 | Stop reason: HOSPADM

## 2022-10-17 RX ORDER — HEPARIN SODIUM 1000 [USP'U]/ML
80 INJECTION, SOLUTION INTRAVENOUS; SUBCUTANEOUS PRN
Status: CANCELLED | OUTPATIENT
Start: 2022-10-17 | Stop reason: SDUPTHER

## 2022-10-17 RX ORDER — ACETAMINOPHEN 325 MG/1
650 TABLET ORAL EVERY 6 HOURS PRN
Status: DISCONTINUED | OUTPATIENT
Start: 2022-10-17 | End: 2022-10-22 | Stop reason: HOSPADM

## 2022-10-17 RX ORDER — GABAPENTIN 300 MG/1
600 CAPSULE ORAL 3 TIMES DAILY
Status: CANCELLED | OUTPATIENT
Start: 2022-10-17

## 2022-10-17 RX ORDER — LIDOCAINE HYDROCHLORIDE 20 MG/ML
INJECTION, SOLUTION INFILTRATION; PERINEURAL
Status: COMPLETED | OUTPATIENT
Start: 2022-10-17 | End: 2022-10-17

## 2022-10-17 RX ADMIN — SODIUM CHLORIDE: 9 INJECTION, SOLUTION INTRAVENOUS at 17:26

## 2022-10-17 RX ADMIN — GABAPENTIN 600 MG: 300 CAPSULE ORAL at 13:30

## 2022-10-17 RX ADMIN — MIDAZOLAM HYDROCHLORIDE 1 MG: 1 INJECTION, SOLUTION INTRAMUSCULAR; INTRAVENOUS at 16:31

## 2022-10-17 RX ADMIN — LIDOCAINE HYDROCHLORIDE 4 ML: 20 INJECTION, SOLUTION INFILTRATION; PERINEURAL at 16:31

## 2022-10-17 RX ADMIN — MIDAZOLAM HYDROCHLORIDE 0.5 MG: 1 INJECTION, SOLUTION INTRAMUSCULAR; INTRAVENOUS at 17:03

## 2022-10-17 RX ADMIN — FENTANYL CITRATE 25 MCG: 50 INJECTION, SOLUTION INTRAMUSCULAR; INTRAVENOUS at 16:45

## 2022-10-17 RX ADMIN — ALTEPLASE 1 MG/HR: 2.2 INJECTION, POWDER, LYOPHILIZED, FOR SOLUTION INTRAVENOUS at 17:26

## 2022-10-17 RX ADMIN — SODIUM CHLORIDE, PRESERVATIVE FREE 10 ML: 5 INJECTION INTRAVENOUS at 21:31

## 2022-10-17 RX ADMIN — MIDAZOLAM HYDROCHLORIDE 0.5 MG: 1 INJECTION, SOLUTION INTRAMUSCULAR; INTRAVENOUS at 16:56

## 2022-10-17 RX ADMIN — MIDAZOLAM HYDROCHLORIDE 0.5 MG: 1 INJECTION, SOLUTION INTRAMUSCULAR; INTRAVENOUS at 16:46

## 2022-10-17 RX ADMIN — LORAZEPAM 1 MG: 1 TABLET ORAL at 13:29

## 2022-10-17 RX ADMIN — HEPARIN SODIUM 20 UNITS/KG/HR: 10000 INJECTION, SOLUTION INTRAVENOUS at 11:21

## 2022-10-17 RX ADMIN — GABAPENTIN 600 MG: 300 CAPSULE ORAL at 21:27

## 2022-10-17 RX ADMIN — FENTANYL CITRATE 25 MCG: 50 INJECTION, SOLUTION INTRAMUSCULAR; INTRAVENOUS at 17:03

## 2022-10-17 RX ADMIN — FENTANYL CITRATE 50 MCG: 50 INJECTION, SOLUTION INTRAMUSCULAR; INTRAVENOUS at 16:20

## 2022-10-17 RX ADMIN — MIDAZOLAM HYDROCHLORIDE 1 MG: 1 INJECTION, SOLUTION INTRAMUSCULAR; INTRAVENOUS at 16:20

## 2022-10-17 RX ADMIN — MORPHINE SULFATE 2 MG: 2 INJECTION, SOLUTION INTRAMUSCULAR; INTRAVENOUS at 18:14

## 2022-10-17 RX ADMIN — GABAPENTIN 600 MG: 300 CAPSULE ORAL at 09:48

## 2022-10-17 RX ADMIN — MORPHINE SULFATE 2 MG: 2 INJECTION, SOLUTION INTRAMUSCULAR; INTRAVENOUS at 13:34

## 2022-10-17 RX ADMIN — SODIUM CHLORIDE 125 ML/HR: 9 INJECTION, SOLUTION INTRAVENOUS at 16:00

## 2022-10-17 RX ADMIN — FENTANYL CITRATE 25 MCG: 50 INJECTION, SOLUTION INTRAMUSCULAR; INTRAVENOUS at 16:56

## 2022-10-17 RX ADMIN — SODIUM CHLORIDE: 9 INJECTION, SOLUTION INTRAVENOUS at 17:30

## 2022-10-17 RX ADMIN — FENTANYL CITRATE 50 MCG: 50 INJECTION, SOLUTION INTRAMUSCULAR; INTRAVENOUS at 16:31

## 2022-10-17 RX ADMIN — DIPHENHYDRAMINE HYDROCHLORIDE 50 MG: 50 INJECTION, SOLUTION INTRAMUSCULAR; INTRAVENOUS at 16:20

## 2022-10-17 RX ADMIN — IOPAMIDOL 30 ML: 612 INJECTION, SOLUTION INTRAVENOUS at 17:07

## 2022-10-17 ASSESSMENT — PAIN DESCRIPTION - LOCATION
LOCATION: LEG

## 2022-10-17 ASSESSMENT — PAIN SCALES - GENERAL
PAINLEVEL_OUTOF10: 10
PAINLEVEL_OUTOF10: 2
PAINLEVEL_OUTOF10: 9
PAINLEVEL_OUTOF10: 4
PAINLEVEL_OUTOF10: 2
PAINLEVEL_OUTOF10: 9
PAINLEVEL_OUTOF10: 0

## 2022-10-17 ASSESSMENT — PAIN DESCRIPTION - ORIENTATION
ORIENTATION: LEFT;UPPER
ORIENTATION: LEFT
ORIENTATION: LEFT

## 2022-10-17 ASSESSMENT — PAIN - FUNCTIONAL ASSESSMENT: PAIN_FUNCTIONAL_ASSESSMENT: 0-10

## 2022-10-17 ASSESSMENT — PAIN DESCRIPTION - DESCRIPTORS: DESCRIPTORS: SORE

## 2022-10-17 NOTE — PROGRESS NOTES
Hospitalist Progress Note   Admit Date:  10/15/2022 11:25 AM   Name:  Makeda Durham   Age:  56 y.o. Sex:  male  :  1965   MRN:  [id number]   Room:  [facility]/    Presenting Complaint: Leg Pain and Urinary Pain     Reason(s) for Admission: Acute deep vein thrombosis (DVT) of popliteal vein of left lower extremity (HCC) [I82.432]  Urinary tract infection in male [N39.0]  Acute deep vein thrombosis (DVT) of left lower extremity, unspecified vein ([address] [I82.402]     Hospital Course:   56 y.o. male with medical history of polycythemia vera, IVC filter placement who presented with unilateral swelling and edema of his left lower extremity and tenderness to his left thigh. Patient stated that symptoms started worsening over the past few days. Patient states that he had worsening ability to get around the house. Patient was just recently given a prescription of Bactrim for urinary tract infection but stated he still complains of similar symptoms. Patient is very poor historian. When asked about IVC filter patient was unaware he had one placed. No recollection of past medical history including polycythemia vera. CT of abdomen suspicious for cystitis. Ultrasound showed extensive DVTs of left lower extremity. Infrarenal IVC filter with suggestion of large thrombus burden distal to the IVC filter some intra device thrombus appears to extend superior/proximal to the filter. Patient denied any cardiac chest pain, shortness of breath, abdominal pain, fever/chills. Subjective & 24hr Events (10/17/22): Patient seen and examined at the bedside. Patient to go downtown for IR procedure's afternoon. Patient still with left leg pain. Patient denies any cardiac chest pain, shortness of breath, abdominal pain, fever/chills.       Assessment & Plan:   Acute deep vein thrombosis (DVT) of popliteal vein of left lower extremity (HCC)  -As evidenced on lower extremity Doppler  - Patient started on heparin drip  - Hematology consulted  - Continue to monitor platelet count daily  - Patient's vital stable, patient not in any respiratory distress  - Patient will need indefinite anticoagulation  - Per oncology if beta-2 microglobulin and anticardiolipin antibodies negative then patient can be discharged on Eliquis/Xarelto  - Patient undergo thrombectomy 10/18 with IR    Secondary erythrocytosis  - Oncology consulted, appreciate recommendations  - JAK2 negative    IVC filter clot burden  - IR consulted, appreciate recommendations  - Continue heparin    Peripheral neuropathy  - Continue home gabapentin    Anxiety  - Continue home Ativan      Anticipated discharge needs:    Dispo pending clinical course. Patient will need age-appropriate cancer screening as outpatient, colonoscopy, PSA    Diet:  Diet NPO  DVT PPx: Heparin drip  Code status: Full Code    Hospital Problems:  Principal Problem:    Acute deep vein thrombosis (DVT) of popliteal vein of left lower extremity (HCC)  Resolved Problems:    * No resolved hospital problems. *      Objective:   Patient Vitals for the past 24 hrs:   Temp Pulse Resp BP SpO2   10/17/22 1404 -- -- 18 -- --   10/17/22 1233 99.8 °F (37.7 °C) 82 18 129/87 98 %   10/17/22 1037 -- 87 -- -- --   10/17/22 0815 97.8 °F (36.6 °C) 81 19 (!) 139/93 97 %   10/17/22 0413 99 °F (37.2 °C) 73 17 108/70 98 %   10/16/22 2345 98.2 °F (36.8 °C) 87 18 108/76 91 %   10/16/22 2118 98.6 °F (37 °C) 75 16 107/79 96 %   10/16/22 1624 99.5 °F (37.5 °C) 83 16 128/84 99 %       Oxygen Therapy  SpO2: 98 %  Pulse Oximeter Device Mode: Intermittent  O2 Device: None (Room air)    Estimated body mass index is 24.34 kg/m² as calculated from the following:    Height as of this encounter: 6' 4\" (1.93 m). Weight as of this encounter: 200 lb (90.7 kg).     Intake/Output Summary (Last 24 hours) at 10/17/2022 1534  Last data filed at 10/17/2022 1330  Gross per 24 hour   Intake 927.79 ml   Output 2945 ml   Net -2017.21 ml         Physical Exam:   Blood pressure 129/87, pulse 82, temperature 99.8 °F (37.7 °C), temperature source Oral, resp. rate 18, height 6' 4\" (1.93 m), weight 200 lb (90.7 kg), SpO2 98 %. General:          Well nourished. Head:               Normocephalic, atraumatic  Eyes:               Sclerae appear normal.  Pupils equally round. ENT:                Nares appear normal, no drainage. Moist oral mucosa  Neck:               No restricted ROM. Trachea midline   CV:                  RRR. No m/r/g. No jugular venous distension. Lungs:             CTAB. No wheezing, rhonchi, or rales. Symmetric expansion. Abdomen: Bowel sounds present. Soft, nontender, nondistended. Extremities:     No cyanosis or clubbing. Left lower extremity swelling and tenderness. Skin:                No rashes and normal coloration. Warm and dry. Neuro:             CN II-XII grossly intact. Sensation intact. A&Ox3  Psych:             Normal mood and affect.     I have personally reviewed labs and tests showing:  Recent Labs:  Recent Results (from the past 48 hour(s))   APTT    Collection Time: 10/15/22  3:57 PM   Result Value Ref Range    PTT 34.3 24.1 - 35.1 SEC   Protime-INR    Collection Time: 10/15/22  3:57 PM   Result Value Ref Range    Protime 15.0 (H) 12.6 - 14.5 sec    INR 1.1     Anti-Xa, Unfractionated Heparin    Collection Time: 10/15/22  9:31 PM   Result Value Ref Range    Anti-XA Unfrac Heparin 0.45 0.3 - 0.7 IU/mL   APTT    Collection Time: 10/15/22  9:31 PM   Result Value Ref Range    .7 (H) 24.1 - 35.1 SEC   CBC    Collection Time: 10/15/22  9:31 PM   Result Value Ref Range    WBC 7.9 4.3 - 11.1 K/uL    RBC 3.42 (L) 4.23 - 5.6 M/uL    Hemoglobin 11.7 (L) 13.6 - 17.2 g/dL    Hematocrit 33.2 (L) 41.1 - 50.3 %    MCV 97.1 82.0 - 102.0 FL    MCH 34.2 (H) 26.1 - 32.9 PG    MCHC 35.2 (H) 31.4 - 35.0 g/dL    RDW 12.3 11.9 - 14.6 %    Platelets 179 588 - 410 K/uL    MPV 9.4 9.4 - 12.3 FL    nRBC 0.00 0.0 - 0.2 K/uL Urinalysis, Micro    Collection Time: 10/15/22 10:58 PM   Result Value Ref Range    WBC, UA 0-3 0 /hpf    RBC, UA 0-3 0 /hpf    Epithelial Cells UA 0-3 0 /hpf    BACTERIA, URINE 0 0 /hpf    Casts 0 0 /lpf    Crystals 0 0 /LPF    Mucus, UA 0 0 /lpf   Anti-Xa, Unfractionated Heparin    Collection Time: 10/16/22  4:51 AM   Result Value Ref Range    Anti-XA Unfrac Heparin 0.25 (L) 0.3 - 0.7 IU/mL   CBC with Auto Differential    Collection Time: 10/16/22  4:51 AM   Result Value Ref Range    WBC 7.5 4.3 - 11.1 K/uL    RBC 3.53 (L) 4.23 - 5.6 M/uL    Hemoglobin 11.9 (L) 13.6 - 17.2 g/dL    Hematocrit 34.2 (L) 41.1 - 50.3 %    MCV 96.9 82.0 - 102.0 FL    MCH 33.7 (H) 26.1 - 32.9 PG    MCHC 34.8 31.4 - 35.0 g/dL    RDW 12.6 11.9 - 14.6 %    Platelets 308 323 - 642 K/uL    MPV 9.3 (L) 9.4 - 12.3 FL    nRBC 0.00 0.0 - 0.2 K/uL    Differential Type AUTOMATED      Seg Neutrophils 72 43 - 78 %    Lymphocytes 15 13 - 44 %    Monocytes 11 4.0 - 12.0 %    Eosinophils % 1 0.5 - 7.8 %    Basophils 1 0.0 - 2.0 %    Immature Granulocytes 1 0.0 - 5.0 %    Segs Absolute 5.4 1.7 - 8.2 K/UL    Absolute Lymph # 1.1 0.5 - 4.6 K/UL    Absolute Mono # 0.8 0.1 - 1.3 K/UL    Absolute Eos # 0.1 0.0 - 0.8 K/UL    Basophils Absolute 0.0 0.0 - 0.2 K/UL    Absolute Immature Granulocyte 0.0 0.0 - 0.5 K/UL   Comprehensive Metabolic Panel w/ Reflex to MG    Collection Time: 10/16/22  4:51 AM   Result Value Ref Range    Sodium 135 133 - 143 mmol/L    Potassium 3.6 3.5 - 5.1 mmol/L    Chloride 102 101 - 110 mmol/L    CO2 27 21 - 32 mmol/L    Anion Gap 6 2 - 11 mmol/L    Glucose 112 (H) 65 - 100 mg/dL    BUN 6 6 - 23 MG/DL    Creatinine 0.53 (L) 0.8 - 1.5 MG/DL    Est, Glom Filt Rate >60 >60 ml/min/1.73m2    Calcium 8.7 8.3 - 10.4 MG/DL    Total Bilirubin 0.7 0.2 - 1.1 MG/DL    ALT 32 12 - 65 U/L    AST 53 (H) 15 - 37 U/L    Alk Phosphatase 209 (H) 50 - 136 U/L    Total Protein 6.3 6.3 - 8.2 g/dL    Albumin 2.0 (L) 3.5 - 5.0 g/dL    Globulin 4.3 2.8 - 4.5 g/dL    Albumin/Globulin Ratio 0.5 0.4 - 1.6     Anti-Xa, Unfractionated Heparin    Collection Time: 10/16/22 12:33 PM   Result Value Ref Range    Anti-XA Unfrac Heparin 0.53 0.3 - 0.7 IU/mL   Anti-Xa, Unfractionated Heparin    Collection Time: 10/16/22  6:26 PM   Result Value Ref Range    Anti-XA Unfrac Heparin 0.35 0.3 - 0.7 IU/mL   CBC    Collection Time: 10/17/22  5:36 AM   Result Value Ref Range    WBC 6.8 4.3 - 11.1 K/uL    RBC 3.45 (L) 4.23 - 5.6 M/uL    Hemoglobin 11.7 (L) 13.6 - 17.2 g/dL    Hematocrit 33.9 (L) 41.1 - 50.3 %    MCV 98.3 82.0 - 102.0 FL    MCH 33.9 (H) 26.1 - 32.9 PG    MCHC 34.5 31.4 - 35.0 g/dL    RDW 12.6 11.9 - 14.6 %    Platelets 517 094 - 355 K/uL    MPV 9.2 (L) 9.4 - 12.3 FL    nRBC 0.00 0.0 - 0.2 K/uL   Anti-Xa, Unfractionated Heparin    Collection Time: 10/17/22  5:36 AM   Result Value Ref Range    Anti-XA Unfrac Heparin 0.30 0.3 - 0.7 IU/mL       I have personally reviewed imaging studies showing: Other Studies:  CT ABDOMEN PELVIS W IV CONTRAST Additional Contrast? None   Final Result   1. Infrarenal IVC filter with suggestion of large thrombus burden distal to the   IVC filter and prominent perivascular stranding left greater than right. In   addition, some of the intra device thrombus appears to extend superior/proximal   to the filter. 2.  Findings likely representing cystitis with features concerning for ascending   infection. No CT evidence of pyelonephritis. Vascular duplex lower extremity venous left   Final Result   Extensive left lower extremity DVT.           Current Meds:  Current Facility-Administered Medications   Medication Dose Route Frequency    morphine injection 2 mg  2 mg IntraVENous Q4H PRN    LORazepam (ATIVAN) tablet 1 mg  1 mg Oral Daily PRN    sodium chloride flush 0.9 % injection 5-40 mL  5-40 mL IntraVENous 2 times per day    sodium chloride flush 0.9 % injection 5-40 mL  5-40 mL IntraVENous PRN    0.9 % sodium chloride infusion IntraVENous PRN    ondansetron (ZOFRAN-ODT) disintegrating tablet 4 mg  4 mg Oral Q8H PRN    Or    ondansetron (ZOFRAN) injection 4 mg  4 mg IntraVENous Q6H PRN    polyethylene glycol (GLYCOLAX) packet 17 g  17 g Oral Daily PRN    acetaminophen (TYLENOL) tablet 650 mg  650 mg Oral Q6H PRN    Or    acetaminophen (TYLENOL) suppository 650 mg  650 mg Rectal Q6H PRN    magnesium sulfate 2000 mg in 50 mL IVPB premix  2,000 mg IntraVENous PRN    potassium chloride 10 mEq/100 mL IVPB (Peripheral Line)  10 mEq IntraVENous PRN    potassium chloride (KLOR-CON M) extended release tablet 40 mEq  40 mEq Oral PRN    Or    potassium bicarb-citric acid (EFFER-K) effervescent tablet 40 mEq  40 mEq Oral PRN    Or    potassium chloride 10 mEq/100 mL IVPB (Peripheral Line)  10 mEq IntraVENous PRN    cefTRIAXone (ROCEPHIN) 1,000 mg in sodium chloride 0.9 % 50 mL IVPB mini-bag  1,000 mg IntraVENous Q24H    gabapentin (NEURONTIN) capsule 600 mg  600 mg Oral TID     Facility-Administered Medications Ordered in Other Encounters   Medication Dose Route Frequency    0.9 % sodium chloride infusion   IntraCATHeter Continuous    heparin 25,000 units in dextrose 5% 250 mL (premix) infusion  400 Units/hr IntraCATHeter Continuous    alteplase (CATHFLO) 10 mg in sodium chloride 0.9 % 500 mL infusion  1 mg/hr IntraCATHeter Continuous       Signed:  Enma Delgado MD    Part of this note may have been written by using a voice dictation software. The note has been proof read but may still contain some grammatical/other typographical errors.

## 2022-10-17 NOTE — OP NOTE
Department of Interventional Radiology  [phone]        Interventional Radiology Brief Procedure Note    Patient: Sandeep Abel MRN: [id number]  SSN: [id number]    YOB: 1965  Age: 56 y.o. Sex: male      Date of Procedure: 10/17/2022    Pre-Procedure Diagnosis: DVT     Post-Procedure Diagnosis: SAME    Procedure(s): DVT Thrombectomy    Brief Description of Procedure: as above    Performed By: Karthikeyan Luciano MD     Assistants: None    Anesthesia:Moderate Sedation    Estimated Blood Loss: None    Specimens:  None    Implants:  right IJ EKOS infusion catheter     Findings: extensive clot from IVC filter to mid thigh and possibly below. Tpa infusion catheter placed    Complications: None    Recommendations: to ICU for lysis     Follow Up: tomorrow.   Will likely require further intervention and thrombectomy    Signed By: Karthikeyan Luciano MD     October 17, 2022

## 2022-10-17 NOTE — DISCHARGE SUMMARY
Hospitalist Discharge Summary   Admit Date:  10/15/2022 11:25 AM   DC Note date: 10/17/2022  Name:  Aman Ramsey   Age:  56 y.o. Sex:  male  :  1965   MRN:  [id number]   Room:  [facility]  PCP:  Ade Sweet MD    Presenting Complaint: Leg Pain and Urinary Pain     Initial Admission Diagnosis: Acute deep vein thrombosis (DVT) of popliteal vein of left lower extremity (HCC) [I82.432]  Urinary tract infection in male [N39.0]  Acute deep vein thrombosis (DVT) of left lower extremity, unspecified vein ([address] [I82.402]     Problem List for this Hospitalization (present on admission):    Principal Problem:    Acute deep vein thrombosis (DVT) of popliteal vein of left lower extremity ([address]  Resolved Problems:    * No resolved hospital problems. *      Hospital Course:  -Patient be transferred downtown for further medical care. Patient expected to undergo thrombectomy 10/18.    56 y.o. male with medical history of polycythemia vera, IVC filter placement who presented with unilateral swelling and edema of his left lower extremity and tenderness to his left thigh. Patient stated that symptoms started worsening over the past few days. Patient states that he had worsening ability to get around the house. Patient was just recently given a prescription of Bactrim for urinary tract infection but stated he still complains of similar symptoms. Patient is very poor historian. When asked about IVC filter patient was unaware he had one placed. No recollection of past medical history including polycythemia vera. CT of abdomen suspicious for cystitis. Ultrasound showed extensive DVTs of left lower extremity. Infrarenal IVC filter with suggestion of large thrombus burden distal to the IVC filter some intra device thrombus appears to extend superior/proximal to the filter. Patient denied any cardiac chest pain, shortness of breath, abdominal pain, fever/chills.     Acute deep vein thrombosis (DVT) of popliteal vein of left lower extremity ([address]  -As evidenced on lower extremity Doppler  - Patient started on heparin drip  - Hematology consulted  - Continue to monitor platelet count daily  - Patient's vital stable, patient not in any respiratory distress  - Patient will need indefinite anticoagulation  - Per oncology if beta-2 microglobulin and anticardiolipin antibodies negative then patient can be discharged on Eliquis/Xarelto  - Patient undergo thrombectomy 10/18 with IR    Secondary erythrocytosis  - Oncology consulted, appreciate recommendations  - JAK2 negative    IVC filter clot burden  - IR consulted, appreciate recommendations  - Continue heparin    Peripheral neuropathy  - Continue home gabapentin    Anxiety  - Continue home Ativan    Disposition: Is for down[place]on[place]  Diet: Diet NPO  Code Status: Full Code    Follow Ups:      Time spent in patient discharge and coordination 40 minutes. Follow up labs/diagnostics (ultimately defer to outpatient provider):  Defer to outpatient provider    Plan was discussed with patient. All questions answered. Patient was stable at time of discharge. Instructions given to call a physician or return if any concerns. Discharge Medication List as of 10/17/2022  4:02 PM        CONTINUE these medications which have NOT CHANGED    Details   LORazepam (ATIVAN) 1 MG tablet Take 1 mg by mouth. Historical Med      gabapentin (NEURONTIN) 100 MG capsule Take 600 mg by mouth 3 times daily. Historical Med             Procedures done this admission:  * No surgery found *    Consults this admission:  IP CONSULT TO INTERVENTIONAL RADIOLOGY  IP CONSULT TO HEMATOLOGY  IP CONSULT TO INTERVENTIONAL RADIOLOGY  IP CONSULT TO INTERVENTIONAL RADIOLOGY  IP CONSULT TO INTERVENTIONAL RADIOLOGY    Echocardiogram results:  No results found for this or any previous visit. Diagnostic Imaging/Tests:   CT ABDOMEN PELVIS W IV CONTRAST Additional Contrast? None    Result Date: 10/15/2022  1.   Infrarenal IVC filter with suggestion of large thrombus burden distal to the IVC filter and prominent perivascular stranding left greater than right. In addition, some of the intra device thrombus appears to extend superior/proximal to the filter. 2.  Findings likely representing cystitis with features concerning for ascending infection. No CT evidence of pyelonephritis. Vascular duplex lower extremity venous left    Result Date: 10/15/2022  Extensive left lower extremity DVT.        Labs: Results:       BMP, Mg, Phos Recent Labs     10/15/22  1117 10/16/22  0451   * 135   K 3.7 3.6   CL 96* 102   CO2 25 27   ANIONGAP 11 6   BUN 8 6   CREATININE 0.71* 0.53*   LABGLOM >60 >60   CALCIUM 9.2 8.7   GLUCOSE 92 112*      CBC Recent Labs     10/15/22  2131 10/16/22  0451 10/17/22  0536   WBC 7.9 7.5 6.8   RBC 3.42* 3.53* 3.45*   HGB 11.7* 11.9* 11.7*   HCT 33.2* 34.2* 33.9*   MCV 97.1 96.9 98.3   MCH 34.2* 33.7* 33.9*   MCHC 35.2* 34.8 34.5   RDW 12.3 12.6 12.6    301 375   MPV 9.4 9.3* 9.2*   NRBC 0.00 0.00 0.00   SEGS  --  72  --    LYMPHOPCT  --  15  --    EOSRELPCT  --  1  --    MONOPCT  --  11  --    BASOPCT  --  1  --    IMMGRAN  --  1  --    SEGSABS  --  5.4  --    LYMPHSABS  --  1.1  --    EOSABS  --  0.1  --    MONOSABS  --  0.8  --    BASOSABS  --  0.0  --    ABSIMMGRAN  --  0.0  --       LFT Recent Labs     10/15/22  1117 10/16/22  0451   BILITOT 1.4* 0.7   ALKPHOS 190* 209*   AST 34 53*   ALT 28 32   PROT 7.5 6.3   LABALBU 2.4* 2.0*   GLOB 5.1* 4.3      Cardiac  Lab Results   Component Value Date/Time    NTPROBNP 55 10/15/2022 11:17 AM      Coags Lab Results   Component Value Date/Time    PROTIME 15.0 10/15/2022 03:57 PM    INR 1.1 10/15/2022 03:57 PM    APTT 122.7 10/15/2022 09:31 PM    APTT 34.3 10/15/2022 03:57 PM      A1c No results found for: LABA1C, EAG   Lipids No results found for: CHOL, LDLCALC, LABVLDL, HDL, CHOLHDLRATIO, TRIG   Thyroid  No results found for: TSHELE, WFV4OEE     Most Recent UA Lab Results   Component Value Date/Time    WBCUA 0-3 10/15/2022 10:58 PM    RBCUA 0-3 10/15/2022 10:58 PM    EPITHUA 0-3 10/15/2022 10:58 PM    BACTERIA 0 10/15/2022 10:58 PM    LABCAST 0 10/15/2022 10:58 PM    MUCUS 0 10/15/2022 10:58 PM        No results for input(s): CULTURE in the last 720 hours. All Labs from Last 24 Hrs:  Recent Results (from the past 24 hour(s))   Anti-Xa, Unfractionated Heparin    Collection Time: 10/16/22  6:26 PM   Result Value Ref Range    Anti-XA Unfrac Heparin 0.35 0.3 - 0.7 IU/mL   CBC    Collection Time: 10/17/22  5:36 AM   Result Value Ref Range    WBC 6.8 4.3 - 11.1 K/uL    RBC 3.45 (L) 4.23 - 5.6 M/uL    Hemoglobin 11.7 (L) 13.6 - 17.2 g/dL    Hematocrit 33.9 (L) 41.1 - 50.3 %    MCV 98.3 82.0 - 102.0 FL    MCH 33.9 (H) 26.1 - 32.9 PG    MCHC 34.5 31.4 - 35.0 g/dL    RDW 12.6 11.9 - 14.6 %    Platelets 374 726 - 230 K/uL    MPV 9.2 (L) 9.4 - 12.3 FL    nRBC 0.00 0.0 - 0.2 K/uL   Anti-Xa, Unfractionated Heparin    Collection Time: 10/17/22  5:36 AM   Result Value Ref Range    Anti-XA Unfrac Heparin 0.30 0.3 - 0.7 IU/mL       No Known Allergies    There is no immunization history on file for this patient. Recent Vital Data:  Patient Vitals for the past 24 hrs:   Temp Pulse Resp BP SpO2   10/17/22 1404 -- -- 18 -- --   10/17/22 1233 99.8 °F (37.7 °C) 82 18 129/87 98 %   10/17/22 1037 -- 87 -- -- --   10/17/22 0815 97.8 °F (36.6 °C) 81 19 (!) 139/93 97 %   10/17/22 0413 99 °F (37.2 °C) 73 17 108/70 98 %   10/16/22 2345 98.2 °F (36.8 °C) 87 18 108/76 91 %   10/16/22 2118 98.6 °F (37 °C) 75 16 107/79 96 %   10/16/22 1624 99.5 °F (37.5 °C) 83 16 128/84 99 %       Oxygen Therapy  SpO2: 98 %  Pulse Oximeter Device Mode: Intermittent  O2 Device: None (Room air)    Estimated body mass index is 24.34 kg/m² as calculated from the following:    Height as of this encounter: 6' 4\" (1.93 m). Weight as of this encounter: 200 lb (90.7 kg).     Intake/Output Summary (Last 24 hours) at 10/17/2022 7950 [facility] filed at 10/17/2022 1330  Gross per 24 hour   Intake 927.79 ml   Output 2945 ml   Net -2017.21 ml         Physical Exam:  General:          Well nourished. Head:               Normocephalic, atraumatic  Eyes:               Sclerae appear normal.  Pupils equally round. ENT:                Nares appear normal, no drainage. Moist oral mucosa  Neck:               No restricted ROM. Trachea midline   CV:                  RRR. No m/r/g. No jugular venous distension. Lungs:             CTAB. No wheezing, rhonchi, or rales. Symmetric expansion. Abdomen: Bowel sounds present. Soft, nontender, nondistended. Extremities:     No cyanosis or clubbing. Left lower extremity swelling and tenderness. Skin:                No rashes and normal coloration. Warm and dry. Neuro:             CN II-XII grossly intact. Sensation intact. A&Ox3  Psych:             Normal mood and affect. Signed:  Gómez Cortes MD    Part of this note may have been written by using a voice dictation software. The note has been proof read but may still contain some grammatical/other typographical errors.

## 2022-10-17 NOTE — OR NURSING
TRANSFER - OUT REPORT:    Verbal bedside report given to [facility] on Samaria Strong  being transferred to CCU room 3308 for routine progression of patient care       Report consisted of patient's Situation, Background, Assessment and   Recommendations(SBAR). Information from the following report(s) Nurse Handoff Report, Adult Overview, Surgery Report and Intake/Output was reviewed with the receiving nurse. Lines:   Peripheral IV 43/59/68 Left Basilic (Active)   Site Assessment Clean, dry & intact 10/17/22 1230   Line Status Infusing 10/17/22 1230   Line Care Connections checked and tightened 10/15/22 2050   Phlebitis Assessment No symptoms 10/17/22 0759   Infiltration Assessment 0 10/17/22 0759   Alcohol Cap Used No 10/17/22 0759   Dressing Status Clean, dry & intact 10/17/22 1230   Dressing Type Transparent 10/16/22 0715       Peripheral IV 10/16/22 Left; Anterior Forearm (Active)   Site Assessment Clean, dry & intact 10/17/22 1338   Line Status Flushed;Normal saline locked; Capped 10/17/22 1338   Phlebitis Assessment No symptoms 10/17/22 0759   Infiltration Assessment 0 10/17/22 0759   Alcohol Cap Used Yes 10/17/22 0759   Dressing Status Clean, dry & intact 10/17/22 1338        Opportunity for questions and clarification was provided.       Patient transported with:  Registered Nurse X2

## 2022-10-17 NOTE — PRE SEDATION
Sedation Pre-Procedure Note    Patient Name: Danish Chamberlain   YOB: 1965  Room/Bed: Room/bed info not found  Medical Record Number: [id number]  Date: 10/17/2022   Time: 3:23 PM       Indication:  DVT    Consent: I have discussed with the patient and/or the patient representative the indication, alternatives, and the possible risks and/or complications of the planned procedure and the anesthesia methods. The patient and/or patient representative appear to understand and agree to proceed. Vital Signs:   Vitals:    10/17/22 1457   BP: 139/86   Pulse: 86   Resp: 18   Temp: 97.4 °F (36.3 °C)   SpO2: 98%       Past Medical History:   has no past medical history on file. Past Surgical History:   has a past surgical history that includes hip surgery (Left) and Wrist surgery (Right). Medications:   Scheduled Meds:   Continuous Infusions:   PRN Meds:   Home Meds:   Prior to Admission medications    Medication Sig Start Date End Date Taking? Authorizing Provider   LORazepam (ATIVAN) 1 MG tablet Take 1 mg by mouth. Historical Provider, MD   gabapentin (NEURONTIN) 100 MG capsule Take 600 mg by mouth 3 times daily. Historical Provider, MD         Pre-Sedation Documentation and Exam:   I have reviewed the patient's history and review of systems.     Mallampati Airway Assessment:  Mallampati Class I - (soft palate, fauces, uvula & anterior/posterior tonsillar pillars are visible)    Prior History of Anesthesia Complications:   none    ASA Classification:  Class 3 - A patient with severe systemic disease that limits activity but is not incapacitating    Sedation/ Anesthesia Plan:   intravenous sedation    Medications Planned:   midazolam (Versed) intravenously and fentanyl intravenously    Patient is an appropriate candidate for plan of sedation: yes    Electronically signed by Alita Cowden, MD on 10/17/2022 at 3:23 PM

## 2022-10-18 ENCOUNTER — ANESTHESIA EVENT (OUTPATIENT)
Dept: INTERVENTIONAL RADIOLOGY/VASCULAR | Age: 57
DRG: 272 | End: 2022-10-18
Payer: MEDICAID

## 2022-10-18 ENCOUNTER — APPOINTMENT (OUTPATIENT)
Dept: INTERVENTIONAL RADIOLOGY/VASCULAR | Age: 57
DRG: 272 | End: 2022-10-18
Attending: INTERNAL MEDICINE
Payer: MEDICAID

## 2022-10-18 ENCOUNTER — ANESTHESIA (OUTPATIENT)
Dept: INTERVENTIONAL RADIOLOGY/VASCULAR | Age: 57
DRG: 272 | End: 2022-10-18
Payer: MEDICAID

## 2022-10-18 ENCOUNTER — APPOINTMENT (OUTPATIENT)
Dept: ULTRASOUND IMAGING | Age: 57
DRG: 272 | End: 2022-10-18
Attending: INTERNAL MEDICINE
Payer: MEDICAID

## 2022-10-18 ENCOUNTER — APPOINTMENT (OUTPATIENT)
Dept: GENERAL RADIOLOGY | Age: 57
DRG: 272 | End: 2022-10-18
Attending: INTERNAL MEDICINE
Payer: MEDICAID

## 2022-10-18 LAB
ACT BLD: 109 SECS (ref 70–128)
ACT BLD: 231 SECS (ref 70–128)
ACT BLD: 242 SECS (ref 70–128)
APPEARANCE UR: CLEAR
APTT PPP: 52.6 SEC (ref 24.1–35.1)
APTT PPP: 61.1 SEC (ref 24.1–35.1)
APTT PPP: 64.1 SEC (ref 24.1–35.1)
B PERT DNA SPEC QL NAA+PROBE: NOT DETECTED
BACTERIA URNS QL MICRO: 0 /HPF
BASOPHILS # BLD: 0.1 K/UL (ref 0–0.2)
BASOPHILS NFR BLD: 1 % (ref 0–2)
BILIRUB UR QL: NEGATIVE
BORDETELLA PARAPERTUSSIS BY PCR: NOT DETECTED
C PNEUM DNA SPEC QL NAA+PROBE: NOT DETECTED
COLOR UR: ABNORMAL
DIFFERENTIAL METHOD BLD: ABNORMAL
EOSINOPHIL # BLD: 0.1 K/UL (ref 0–0.8)
EOSINOPHIL NFR BLD: 1 % (ref 0.5–7.8)
ERYTHROCYTE [DISTWIDTH] IN BLOOD BY AUTOMATED COUNT: 12.9 %
ERYTHROCYTE [DISTWIDTH] IN BLOOD BY AUTOMATED COUNT: 12.9 % (ref 11.9–14.6)
ERYTHROCYTE [DISTWIDTH] IN BLOOD BY AUTOMATED COUNT: 12.9 % (ref 11.9–14.6)
FIBRINOGEN PPP-MCNC: 158 MG/DL (ref 190–501)
FIBRINOGEN PPP-MCNC: 314 MG/DL (ref 190–501)
FIBRINOGEN PPP-MCNC: 655 MG/DL (ref 190–501)
FLUAV H3 RNA SPEC QL NAA+PROBE: DETECTED
FLUBV RNA SPEC QL NAA+PROBE: NOT DETECTED
GLUCOSE UR STRIP.AUTO-MCNC: NEGATIVE MG/DL
HADV DNA SPEC QL NAA+PROBE: NOT DETECTED
HCOV 229E RNA SPEC QL NAA+PROBE: NOT DETECTED
HCOV HKU1 RNA SPEC QL NAA+PROBE: NOT DETECTED
HCOV NL63 RNA SPEC QL NAA+PROBE: NOT DETECTED
HCOV OC43 RNA SPEC QL NAA+PROBE: NOT DETECTED
HCT VFR BLD AUTO: 36.3 % (ref 41.1–50.3)
HCT VFR BLD AUTO: 38.2 % (ref 41.1–50.3)
HCT VFR BLD AUTO: 39 % (ref 41.1–50.3)
HGB BLD-MCNC: 12.2 G/DL (ref 13.6–17.2)
HGB BLD-MCNC: 12.7 G/DL (ref 13.6–17.2)
HGB BLD-MCNC: 13.3 G/DL (ref 13.6–17.2)
HGB UR QL STRIP: NEGATIVE
HMPV RNA SPEC QL NAA+PROBE: NOT DETECTED
HPIV1 RNA SPEC QL NAA+PROBE: NOT DETECTED
HPIV2 RNA SPEC QL NAA+PROBE: NOT DETECTED
HPIV3 RNA SPEC QL NAA+PROBE: NOT DETECTED
HPIV4 RNA SPEC QL NAA+PROBE: NOT DETECTED
IMM GRANULOCYTES # BLD AUTO: 0.1 K/UL (ref 0–0.5)
IMM GRANULOCYTES NFR BLD AUTO: 1 % (ref 0–5)
INR PPP: 1.4
KETONES UR QL STRIP.AUTO: ABNORMAL MG/DL
LEUKOCYTE ESTERASE UR QL STRIP.AUTO: NEGATIVE
LYMPHOCYTES # BLD: 1 K/UL (ref 0.5–4.6)
LYMPHOCYTES NFR BLD: 13 % (ref 13–44)
M PNEUMO DNA SPEC QL NAA+PROBE: NOT DETECTED
MCH RBC QN AUTO: 33.5 PG (ref 26.1–32.9)
MCH RBC QN AUTO: 34.2 PG (ref 26.1–32.9)
MCH RBC QN AUTO: 35.1 PG (ref 26.1–32.9)
MCHC RBC AUTO-ENTMCNC: 33.2 G/DL (ref 31.4–35)
MCHC RBC AUTO-ENTMCNC: 33.6 G/DL (ref 31.4–35)
MCHC RBC AUTO-ENTMCNC: 34.1 G/DL (ref 31.4–35)
MCV RBC AUTO: 100.8 FL (ref 82–102)
MCV RBC AUTO: 101.7 FL (ref 82–102)
MCV RBC AUTO: 102.9 FL (ref 82–102)
MONOCYTES # BLD: 0.7 K/UL (ref 0.1–1.3)
MONOCYTES NFR BLD: 9 % (ref 4–12)
NEUTS SEG # BLD: 5.8 K/UL (ref 1.7–8.2)
NEUTS SEG NFR BLD: 75 % (ref 43–78)
NITRITE UR QL STRIP.AUTO: NEGATIVE
NRBC # BLD: 0 K/UL (ref 0–0.2)
NRBC # BLD: 0 K/UL (ref 0–0.2)
PH UR STRIP: 7 [PH] (ref 5–9)
PLATELET # BLD AUTO: 280 K/UL (ref 150–450)
PLATELET # BLD AUTO: 303 K/UL (ref 150–450)
PLATELET # BLD AUTO: 323 K/UL
PMV BLD AUTO: 10.1 FL (ref 9.4–12.3)
PMV BLD AUTO: 9.8 FL (ref 9.4–12.3)
PMV BLD AUTO: 9.9 FL (ref 9.4–12.3)
PROT UR STRIP-MCNC: 100 MG/DL
PROTHROMBIN TIME: 17.4 SEC (ref 12.6–14.5)
RBC # BLD AUTO: 3.57 M/UL (ref 4.23–5.6)
RBC # BLD AUTO: 3.79 M/UL
RBC # BLD AUTO: 3.79 M/UL (ref 4.23–5.6)
RBC #/AREA URNS HPF: ABNORMAL /HPF
RSV RNA SPEC QL NAA+PROBE: NOT DETECTED
RV+EV RNA SPEC QL NAA+PROBE: NOT DETECTED
SARS-COV-2 RNA RESP QL NAA+PROBE: NOT DETECTED
SP GR UR REFRACTOMETRY: 1.01 (ref 1–1.02)
UFH PPP CHRO-ACNC: 0.48 IU/ML (ref 0.3–0.7)
UROBILINOGEN UR QL STRIP.AUTO: 1 EU/DL (ref 0.2–1)
WBC # BLD AUTO: 5.3 K/UL (ref 4.3–11.1)
WBC # BLD AUTO: 5.6 K/UL (ref 4.3–11.1)
WBC # BLD AUTO: 7.7 K/UL (ref 4.3–11.1)

## 2022-10-18 PROCEDURE — 6360000004 HC RX CONTRAST MEDICATION: Performed by: RADIOLOGY

## 2022-10-18 PROCEDURE — 6370000000 HC RX 637 (ALT 250 FOR IP): Performed by: FAMILY MEDICINE

## 2022-10-18 PROCEDURE — 6360000002 HC RX W HCPCS: Performed by: RADIOLOGY

## 2022-10-18 PROCEDURE — 76770 US EXAM ABDO BACK WALL COMP: CPT

## 2022-10-18 PROCEDURE — 2500000003 HC RX 250 WO HCPCS: Performed by: RADIOLOGY

## 2022-10-18 PROCEDURE — 87040 BLOOD CULTURE FOR BACTERIA: CPT

## 2022-10-18 PROCEDURE — 2500000003 HC RX 250 WO HCPCS: Performed by: NURSE ANESTHETIST, CERTIFIED REGISTERED

## 2022-10-18 PROCEDURE — 2580000003 HC RX 258: Performed by: STUDENT IN AN ORGANIZED HEALTH CARE EDUCATION/TRAINING PROGRAM

## 2022-10-18 PROCEDURE — 6360000002 HC RX W HCPCS: Performed by: STUDENT IN AN ORGANIZED HEALTH CARE EDUCATION/TRAINING PROGRAM

## 2022-10-18 PROCEDURE — 6370000000 HC RX 637 (ALT 250 FOR IP): Performed by: STUDENT IN AN ORGANIZED HEALTH CARE EDUCATION/TRAINING PROGRAM

## 2022-10-18 PROCEDURE — 06CD3ZZ EXTIRPATION OF MATTER FROM LEFT COMMON ILIAC VEIN, PERCUTANEOUS APPROACH: ICD-10-PCS | Performed by: RADIOLOGY

## 2022-10-18 PROCEDURE — 7100000001 HC PACU RECOVERY - ADDTL 15 MIN

## 2022-10-18 PROCEDURE — 3E04317 INTRODUCTION OF OTHER THROMBOLYTIC INTO CENTRAL VEIN, PERCUTANEOUS APPROACH: ICD-10-PCS | Performed by: RADIOLOGY

## 2022-10-18 PROCEDURE — 81001 URINALYSIS AUTO W/SCOPE: CPT

## 2022-10-18 PROCEDURE — 85384 FIBRINOGEN ACTIVITY: CPT

## 2022-10-18 PROCEDURE — 0202U NFCT DS 22 TRGT SARS-COV-2: CPT

## 2022-10-18 PROCEDURE — 85610 PROTHROMBIN TIME: CPT

## 2022-10-18 PROCEDURE — 06C03ZZ EXTIRPATION OF MATTER FROM INFERIOR VENA CAVA, PERCUTANEOUS APPROACH: ICD-10-PCS | Performed by: RADIOLOGY

## 2022-10-18 PROCEDURE — 85520 HEPARIN ASSAY: CPT

## 2022-10-18 PROCEDURE — 71045 X-RAY EXAM CHEST 1 VIEW: CPT

## 2022-10-18 PROCEDURE — 7100000000 HC PACU RECOVERY - FIRST 15 MIN

## 2022-10-18 PROCEDURE — 6360000002 HC RX W HCPCS: Performed by: INTERNAL MEDICINE

## 2022-10-18 PROCEDURE — 37252 INTRVASC US NONCORONARY 1ST: CPT

## 2022-10-18 PROCEDURE — 3700000001 HC ADD 15 MINUTES (ANESTHESIA)

## 2022-10-18 PROCEDURE — 85027 COMPLETE CBC AUTOMATED: CPT

## 2022-10-18 PROCEDURE — 6360000002 HC RX W HCPCS: Performed by: NURSE ANESTHETIST, CERTIFIED REGISTERED

## 2022-10-18 PROCEDURE — 2100000000 HC CCU R&B

## 2022-10-18 PROCEDURE — 85347 COAGULATION TIME ACTIVATED: CPT

## 2022-10-18 PROCEDURE — 36415 COLL VENOUS BLD VENIPUNCTURE: CPT

## 2022-10-18 PROCEDURE — 2580000003 HC RX 258: Performed by: RADIOLOGY

## 2022-10-18 PROCEDURE — 2580000003 HC RX 258: Performed by: NURSE ANESTHETIST, CERTIFIED REGISTERED

## 2022-10-18 PROCEDURE — 3700000000 HC ANESTHESIA ATTENDED CARE

## 2022-10-18 PROCEDURE — 6370000000 HC RX 637 (ALT 250 FOR IP): Performed by: INTERNAL MEDICINE

## 2022-10-18 PROCEDURE — 85025 COMPLETE CBC W/AUTO DIFF WBC: CPT

## 2022-10-18 PROCEDURE — 85730 THROMBOPLASTIN TIME PARTIAL: CPT

## 2022-10-18 PROCEDURE — 047D3DZ DILATION OF LEFT COMMON ILIAC ARTERY WITH INTRALUMINAL DEVICE, PERCUTANEOUS APPROACH: ICD-10-PCS | Performed by: RADIOLOGY

## 2022-10-18 RX ORDER — HEPARIN SODIUM 10000 [USP'U]/100ML
5-30 INJECTION, SOLUTION INTRAVENOUS CONTINUOUS
Status: DISCONTINUED | OUTPATIENT
Start: 2022-10-18 | End: 2022-10-18 | Stop reason: SDUPTHER

## 2022-10-18 RX ORDER — SODIUM CHLORIDE, SODIUM LACTATE, POTASSIUM CHLORIDE, CALCIUM CHLORIDE 600; 310; 30; 20 MG/100ML; MG/100ML; MG/100ML; MG/100ML
INJECTION, SOLUTION INTRAVENOUS CONTINUOUS PRN
Status: DISCONTINUED | OUTPATIENT
Start: 2022-10-18 | End: 2022-10-18 | Stop reason: SDUPTHER

## 2022-10-18 RX ORDER — HEPARIN SODIUM 1000 [USP'U]/ML
40 INJECTION, SOLUTION INTRAVENOUS; SUBCUTANEOUS PRN
Status: DISCONTINUED | OUTPATIENT
Start: 2022-10-18 | End: 2022-10-20

## 2022-10-18 RX ORDER — HEPARIN SODIUM 1000 [USP'U]/ML
INJECTION, SOLUTION INTRAVENOUS; SUBCUTANEOUS PRN
Status: DISCONTINUED | OUTPATIENT
Start: 2022-10-18 | End: 2022-10-18 | Stop reason: SDUPTHER

## 2022-10-18 RX ORDER — ROCURONIUM BROMIDE 10 MG/ML
INJECTION, SOLUTION INTRAVENOUS PRN
Status: DISCONTINUED | OUTPATIENT
Start: 2022-10-18 | End: 2022-10-18 | Stop reason: SDUPTHER

## 2022-10-18 RX ORDER — ONDANSETRON 2 MG/ML
INJECTION INTRAMUSCULAR; INTRAVENOUS PRN
Status: DISCONTINUED | OUTPATIENT
Start: 2022-10-18 | End: 2022-10-18 | Stop reason: SDUPTHER

## 2022-10-18 RX ORDER — HEPARIN SODIUM 200 [USP'U]/100ML
INJECTION, SOLUTION INTRAVENOUS CONTINUOUS PRN
Status: COMPLETED | OUTPATIENT
Start: 2022-10-18 | End: 2022-10-18

## 2022-10-18 RX ORDER — HEPARIN SODIUM 1000 [USP'U]/ML
80 INJECTION, SOLUTION INTRAVENOUS; SUBCUTANEOUS PRN
Status: DISCONTINUED | OUTPATIENT
Start: 2022-10-18 | End: 2022-10-20

## 2022-10-18 RX ORDER — GLYCOPYRROLATE 0.2 MG/ML
INJECTION INTRAMUSCULAR; INTRAVENOUS PRN
Status: DISCONTINUED | OUTPATIENT
Start: 2022-10-18 | End: 2022-10-18 | Stop reason: SDUPTHER

## 2022-10-18 RX ORDER — NEOSTIGMINE METHYLSULFATE 1 MG/ML
INJECTION, SOLUTION INTRAVENOUS PRN
Status: DISCONTINUED | OUTPATIENT
Start: 2022-10-18 | End: 2022-10-18 | Stop reason: SDUPTHER

## 2022-10-18 RX ORDER — OSELTAMIVIR PHOSPHATE 75 MG/1
75 CAPSULE ORAL 2 TIMES DAILY
Status: DISCONTINUED | OUTPATIENT
Start: 2022-10-18 | End: 2022-10-22 | Stop reason: HOSPADM

## 2022-10-18 RX ORDER — HEPARIN SODIUM 10000 [USP'U]/100ML
5-30 INJECTION, SOLUTION INTRAVENOUS CONTINUOUS
Status: DISCONTINUED | OUTPATIENT
Start: 2022-10-18 | End: 2022-10-20

## 2022-10-18 RX ORDER — EPHEDRINE SULFATE/0.9% NACL/PF 50 MG/5 ML
SYRINGE (ML) INTRAVENOUS PRN
Status: DISCONTINUED | OUTPATIENT
Start: 2022-10-18 | End: 2022-10-18 | Stop reason: SDUPTHER

## 2022-10-18 RX ORDER — LIDOCAINE HYDROCHLORIDE 20 MG/ML
INJECTION, SOLUTION EPIDURAL; INFILTRATION; INTRACAUDAL; PERINEURAL PRN
Status: DISCONTINUED | OUTPATIENT
Start: 2022-10-18 | End: 2022-10-18 | Stop reason: SDUPTHER

## 2022-10-18 RX ORDER — LORAZEPAM 1 MG/1
1 TABLET ORAL 2 TIMES DAILY PRN
Status: DISCONTINUED | OUTPATIENT
Start: 2022-10-18 | End: 2022-10-22 | Stop reason: HOSPADM

## 2022-10-18 RX ORDER — PROPOFOL 10 MG/ML
INJECTION, EMULSION INTRAVENOUS PRN
Status: DISCONTINUED | OUTPATIENT
Start: 2022-10-18 | End: 2022-10-18 | Stop reason: SDUPTHER

## 2022-10-18 RX ORDER — LIDOCAINE HYDROCHLORIDE 20 MG/ML
INJECTION, SOLUTION INFILTRATION; PERINEURAL
Status: COMPLETED | OUTPATIENT
Start: 2022-10-18 | End: 2022-10-18

## 2022-10-18 RX ORDER — DEXAMETHASONE SODIUM PHOSPHATE 4 MG/ML
INJECTION, SOLUTION INTRA-ARTICULAR; INTRALESIONAL; INTRAMUSCULAR; INTRAVENOUS; SOFT TISSUE PRN
Status: DISCONTINUED | OUTPATIENT
Start: 2022-10-18 | End: 2022-10-18 | Stop reason: SDUPTHER

## 2022-10-18 RX ADMIN — GABAPENTIN 600 MG: 300 CAPSULE ORAL at 09:35

## 2022-10-18 RX ADMIN — HEPARIN SODIUM 1000 ML/HR: 200 INJECTION, SOLUTION INTRAVENOUS at 14:00

## 2022-10-18 RX ADMIN — DEXAMETHASONE SODIUM PHOSPHATE 4 MG: 4 INJECTION, SOLUTION INTRAMUSCULAR; INTRAVENOUS at 16:36

## 2022-10-18 RX ADMIN — Medication 10 MG: at 14:18

## 2022-10-18 RX ADMIN — CEFTRIAXONE 1000 MG: 1 INJECTION, POWDER, FOR SOLUTION INTRAMUSCULAR; INTRAVENOUS at 17:39

## 2022-10-18 RX ADMIN — ROCURONIUM BROMIDE 15 MG: 50 INJECTION, SOLUTION INTRAVENOUS at 14:36

## 2022-10-18 RX ADMIN — ACETAMINOPHEN 650 MG: 325 TABLET ORAL at 09:36

## 2022-10-18 RX ADMIN — GABAPENTIN 600 MG: 300 CAPSULE ORAL at 21:56

## 2022-10-18 RX ADMIN — LIDOCAINE HYDROCHLORIDE 70 MG: 20 INJECTION, SOLUTION EPIDURAL; INFILTRATION; INTRACAUDAL; PERINEURAL at 13:37

## 2022-10-18 RX ADMIN — HEPARIN SODIUM 3000 UNITS: 1000 INJECTION, SOLUTION INTRAVENOUS; SUBCUTANEOUS at 14:49

## 2022-10-18 RX ADMIN — SODIUM CHLORIDE: 9 INJECTION, SOLUTION INTRAVENOUS at 13:24

## 2022-10-18 RX ADMIN — ACETAMINOPHEN 650 MG: 325 TABLET ORAL at 00:15

## 2022-10-18 RX ADMIN — OSELTAMIVIR PHOSPHATE 75 MG: 75 CAPSULE ORAL at 19:30

## 2022-10-18 RX ADMIN — SODIUM CHLORIDE, PRESERVATIVE FREE 10 ML: 5 INJECTION INTRAVENOUS at 21:55

## 2022-10-18 RX ADMIN — PROPOFOL 200 MG: 10 INJECTION, EMULSION INTRAVENOUS at 13:37

## 2022-10-18 RX ADMIN — GLYCOPYRROLATE 0.4 MG: 0.2 INJECTION, SOLUTION INTRAMUSCULAR; INTRAVENOUS at 16:36

## 2022-10-18 RX ADMIN — Medication 10 MG: at 14:21

## 2022-10-18 RX ADMIN — HEPARIN SODIUM 1000 ML/HR: 200 INJECTION, SOLUTION INTRAVENOUS at 16:00

## 2022-10-18 RX ADMIN — ROCURONIUM BROMIDE 20 MG: 50 INJECTION, SOLUTION INTRAVENOUS at 15:01

## 2022-10-18 RX ADMIN — ROCURONIUM BROMIDE 35 MG: 50 INJECTION, SOLUTION INTRAVENOUS at 13:37

## 2022-10-18 RX ADMIN — Medication 3 MG: at 16:36

## 2022-10-18 RX ADMIN — IOPAMIDOL 140 ML: 612 INJECTION, SOLUTION INTRAVENOUS at 16:34

## 2022-10-18 RX ADMIN — LORAZEPAM 1 MG: 1 TABLET ORAL at 04:03

## 2022-10-18 RX ADMIN — LIDOCAINE HYDROCHLORIDE 8 ML: 20 INJECTION, SOLUTION INFILTRATION; PERINEURAL at 13:59

## 2022-10-18 RX ADMIN — SODIUM CHLORIDE, PRESERVATIVE FREE 10 ML: 5 INJECTION INTRAVENOUS at 09:39

## 2022-10-18 RX ADMIN — ONDANSETRON 4 MG: 2 INJECTION INTRAMUSCULAR; INTRAVENOUS at 16:36

## 2022-10-18 RX ADMIN — HEPARIN SODIUM 18 UNITS/KG/HR: 10000 INJECTION, SOLUTION INTRAVENOUS at 17:58

## 2022-10-18 RX ADMIN — SODIUM CHLORIDE, SODIUM LACTATE, POTASSIUM CHLORIDE, AND CALCIUM CHLORIDE: 600; 310; 30; 20 INJECTION, SOLUTION INTRAVENOUS at 13:24

## 2022-10-18 RX ADMIN — ALTEPLASE 1 MG/HR: 2.2 INJECTION, POWDER, LYOPHILIZED, FOR SOLUTION INTRAVENOUS at 02:21

## 2022-10-18 RX ADMIN — HEPARIN SODIUM 1000 ML/HR: 200 INJECTION, SOLUTION INTRAVENOUS at 15:00

## 2022-10-18 RX ADMIN — ACETAMINOPHEN 650 MG: 325 TABLET ORAL at 17:40

## 2022-10-18 ASSESSMENT — PAIN SCALES - GENERAL
PAINLEVEL_OUTOF10: 0
PAINLEVEL_OUTOF10: 1
PAINLEVEL_OUTOF10: 0
PAINLEVEL_OUTOF10: 1
PAINLEVEL_OUTOF10: 0
PAINLEVEL_OUTOF10: 0

## 2022-10-18 ASSESSMENT — PAIN DESCRIPTION - LOCATION: LOCATION: LEG

## 2022-10-18 ASSESSMENT — PAIN DESCRIPTION - ORIENTATION: ORIENTATION: LEFT;RIGHT

## 2022-10-18 NOTE — PROGRESS NOTES
TRANSFER - OUT REPORT:    Verbal report given to IR RN on Tacho Ortega  being transferred to IR for ordered procedure       Report consisted of patient's Situation, Background, Assessment and   Recommendations(SBAR). Information from the following report(s) Nurse Handoff Report and Index was reviewed with the receiving nurse. Lines:   Peripheral IV 81/36/19 Left Basilic (Active)   Site Assessment Bleeding 10/18/22 1100   Line Status Blood return noted; Flushed;Normal saline locked; Capped 10/18/22 1100   Line Care Connections checked and tightened;Cap changed 10/18/22 1100   Phlebitis Assessment No symptoms 10/18/22 1100   Infiltration Assessment 0 10/18/22 1100   Alcohol Cap Used Yes 10/18/22 1100   Dressing Status Clean, dry & intact 10/18/22 1100   Dressing Type Transparent 10/18/22 1100       Peripheral IV 10/16/22 Left; Anterior Forearm (Active)   Site Assessment Bleeding 10/18/22 1100   Line Status Infusing 10/18/22 1100   Line Care Connections checked and tightened;Cap changed 10/18/22 1100   Phlebitis Assessment No symptoms 10/18/22 1100   Infiltration Assessment 0 10/18/22 1100   Alcohol Cap Used Yes 10/18/22 1100   Dressing Status Clean, dry & intact 10/18/22 1100   Dressing Type Transparent 10/18/22 1100   Dressing Intervention Dressing changed 10/18/22 3248        Opportunity for questions and clarification was provided.       Patient transported with:  Monitor and Registered Nurse

## 2022-10-18 NOTE — CARE COORDINATION
Case Management Assessment  Initial Evaluation    Date/Time of Evaluation: 10/18/2022 3:21 PM  Assessment Completed by: Stoney Burkitt, RN    If patient is discharged prior to next notation, then this note serves as note for discharge by case management. Patient Name: Yoav Condon                   YOB: 1965  Diagnosis: Acute deep vein thrombosis (DVT) of right lower extremity, unspecified vein ([facility] [address] [I82.401]                   Date / Time: 10/17/2022  6:05 PM    Patient Admission Status: Inpatient   Readmission Risk (Low < 19, Mod (19-27), High > 27): Readmission Risk Score: 11.5    Current PCP: Cecelia Briceno MD  PCP verified by CM? (P) Yes    Chart Reviewed: Yes      History Provided by: (P) Medical Record  Patient Orientation: (P) Alert and Oriented    Patient Cognition: (P) Alert    Hospitalization in the last 30 days (Readmission):  No    If yes, Readmission Assessment in  Navigator will be completed.     Advance Directives:      Code Status: Full Code   Patient's Primary Decision Maker is: (P) Legal Next of Kin      Discharge Planning:    Patient lives with: Alone Type of Home: Apartment  Primary Care Giver: (P) Self  Patient Support Systems include: (P) Parent   Current Financial resources: (P) Medicare ([facility])  Current community resources:  none  Current services prior to admission: (P) Durable Medical Equipment, None            Current DME: (P) Walker            Type of Home Care services:  None    ADLS  Prior functional level: (P) Independent in ADLs/IADLs  Current functional level: (P) Other (see comment) (ICU)    PT AM-PAC:   /24  OT AM-PAC:   /24    Family can provide assistance at DC: (P) Other (comment) (unknown)  Would you like Case Management to discuss the discharge plan with any other family members/significant others, and if so, who? (P) Yes  Plans to Return to Present Housing: (P) Unknown at present  Other Identified Issues/Barriers to RETURNING to current housing: pending d/c needs  Potential Assistance needed at discharge: N/A            Potential DME:  pending  Patient expects to discharge to: (P) Unknown  Plan for transportation at discharge:  pending    Financial    Payor: Yesenia Hilton / Plan: Kelli Terrell / Product Type: *No Product type* /     Does insurance require precert for SNF: Yes    Potential assistance Purchasing Medications:  no  Meds-to-Beds request:  n/a      [id number] [place], [address] [address]  Phone: [phone] Fax: [phone]      Notes: Additional Case Management Notes: Chart reviewed s/p tx from ES to DT CCU for IR. Currently off unit. Screen completed prior to tx DT.  CM following for d/c needs/POC when stable per MD.       The Plan for Transition of Care is related to the following treatment goals of Acute deep vein thrombosis (DVT) of right lower extremity, unspecified vein ([facility] [address] [I82.401]

## 2022-10-18 NOTE — ANESTHESIA PRE PROCEDURE
Department of Anesthesiology  Preprocedure Note       Name:  Nazia Carmona   Age:  56 y.o.  :  1965                                          MRN:  [id number]         Date:  10/18/2022      Surgeon: * No surgeons listed *    Procedure: * No procedures listed *    Medications prior to admission:   Prior to Admission medications    Medication Sig Start Date End Date Taking? Authorizing Provider   LORazepam (ATIVAN) 1 MG tablet Take 1 mg by mouth. Historical Provider, MD   gabapentin (NEURONTIN) 100 MG capsule Take 600 mg by mouth 3 times daily.     Historical Provider, MD       Current medications:    Current Facility-Administered Medications   Medication Dose Route Frequency Provider Last Rate Last Admin    LORazepam (ATIVAN) tablet 1 mg  1 mg Oral BID PRN Sivakumar Lama MD   1 mg at 10/18/22 0403    0.9 % sodium chloride infusion   IntraCATHeter Continuous Cynthia Mcallister MD 35 mL/hr at 10/17/22 1726 New Bag at 10/17/22 1726    heparin 25,000 units in dextrose 5% 250 mL (premix) infusion  400 Units/hr IntraCATHeter Continuous Cynthia Mcallister MD 4 mL/hr at 10/17/22 1727 400 Units/hr at 10/17/22 1727    alteplase (CATHFLO) 10 mg in sodium chloride 0.9 % 500 mL infusion  1 mg/hr IntraCATHeter Continuous Cynthia Mcallister MD 50 mL/hr at 10/18/22 0221 1 mg/hr at 10/18/22 0221    0.9 % sodium chloride infusion   IntraVENous Continuous Cynthia Mcallister MD 25 mL/hr at 10/17/22 1730 New Bag at 10/17/22 1730    sodium chloride flush 0.9 % injection 5-40 mL  5-40 mL IntraVENous 2 times per day Jamila Valenzuela MD   10 mL at 10/18/22 0939    sodium chloride flush 0.9 % injection 5-40 mL  5-40 mL IntraVENous PRN Jamila Valenzuela MD        0.9 % sodium chloride infusion   IntraVENous PRN Jamila Valenzuela MD        ondansetron (ZOFRAN-ODT) disintegrating tablet 4 mg  4 mg Oral Q8H PRN Jamila Valenzuela MD        Or    ondansetron [facility]) injection 4 mg  4 mg IntraVENous Q6H PRN MD Dale Jose polyethylene glycol (GLYCOLAX) packet 17 g  17 g Oral Daily PRN Matthieu Gramajo MD        acetaminophen (TYLENOL) tablet 650 mg  650 mg Oral Q6H PRN Matthieu Gramajo MD   650 mg at 10/18/22 4585    Or    acetaminophen (TYLENOL) suppository 650 mg  650 mg Rectal Q6H PRN Matthieu Gramajo MD        magnesium sulfate 2000 mg in 50 mL IVPB premix  2,000 mg IntraVENous PRN Matthieu Gramajo MD        potassium chloride 10 mEq/100 mL IVPB (Peripheral Line)  10 mEq IntraVENous PRN Matthieu Gramajo MD        potassium chloride (KLOR-CON M) extended release tablet 40 mEq  40 mEq Oral PRN Matthieu Gramajo MD        Or    potassium bicarb-citric acid (EFFER-K) effervescent tablet 40 mEq  40 mEq Oral PRN Matthieu Gramajo MD        Or    potassium chloride 10 mEq/100 mL IVPB (Peripheral Line)  10 mEq IntraVENous PRN Matthieu Gramajo MD        cefTRIAXone (ROCEPHIN) 1,000 mg in sodium chloride 0.9 % 50 mL IVPB mini-bag  1,000 mg IntraVENous Q24H Matthieu Gramajo MD        gabapentin (NEURONTIN) capsule 600 mg  600 mg Oral TID Matthieu Gramajo MD   600 mg at 10/18/22 0935    morphine injection 2 mg  2 mg IntraVENous Q4H PRN Matthieu Gramajo MD   2 mg at 10/17/22 1814       Allergies:  No Known Allergies    Problem List:    Patient Active Problem List   Diagnosis Code    Acute deep vein thrombosis (DVT) of popliteal vein of left lower extremity ([facility]) I82.432    Acute deep vein thrombosis (DVT) of right lower extremity, unspecified vein ([facility]) I82.401       Past Medical History:  History reviewed. No pertinent past medical history.     Past Surgical History:        Procedure Laterality Date    HIP SURGERY Left     WRIST SURGERY Right        Social History:    Social History     Tobacco Use    Smoking status: Not on file    Smokeless tobacco: Not on file   Substance Use Topics    Alcohol use: Not on file                                Counseling given: Not Answered      Vital Signs (Current):   Vitals:    10/18/22 1000 10/18/22 1030 10/18/22 1100 10/18/22 1148   BP: 130/67 113/67 117/71 113/68   Pulse: 57 67 62 58   Resp: 14 (!) 8 11 11   Temp:   (!) 101.3 °F (38.5 °C) 99.5 °F (37.5 °C)   TempSrc:   Oral Oral   SpO2: 96% 92% 93%    Weight:       Height:                                                  BP Readings from Last 3 Encounters:   10/18/22 113/68   10/17/22 129/87       NPO Status: Time of last liquid consumption: 0730                        Time of last solid consumption: 0730                        Date of last liquid consumption: 10/17/22                        Date of last solid food consumption: 10/17/22    BMI:   Wt Readings from Last 3 Encounters:   10/18/22 204 lb 5.9 oz (92.7 kg)   10/15/22 200 lb (90.7 kg)     Body mass index is 24.88 kg/m². CBC:   Lab Results   Component Value Date/Time    WBC 5.3 10/18/2022 09:33 AM    RBC 3.57 10/18/2022 09:33 AM    HGB 12.2 10/18/2022 09:33 AM    HCT 36.3 10/18/2022 09:33 AM    .7 10/18/2022 09:33 AM    RDW 12.9 10/18/2022 09:33 AM     10/18/2022 09:33 AM       CMP:   Lab Results   Component Value Date/Time     10/16/2022 04:51 AM    K 3.6 10/16/2022 04:51 AM     10/16/2022 04:51 AM    CO2 27 10/16/2022 04:51 AM    BUN 6 10/16/2022 04:51 AM    CREATININE 0.53 10/16/2022 04:51 AM    LABGLOM >60 10/16/2022 04:51 AM    GLUCOSE 112 10/16/2022 04:51 AM    PROT 6.3 10/16/2022 04:51 AM    CALCIUM 8.7 10/16/2022 04:51 AM    BILITOT 0.7 10/16/2022 04:51 AM    ALKPHOS 209 10/16/2022 04:51 AM    AST 53 10/16/2022 04:51 AM    ALT 32 10/16/2022 04:51 AM       POC Tests: No results for input(s): POCGLU, POCNA, POCK, POCCL, POCBUN, POCHEMO, POCHCT in the last 72 hours.     Coags:   Lab Results   Component Value Date/Time    PROTIME 17.4 10/18/2022 03:55 AM    INR 1.4 10/18/2022 03:55 AM    APTT 64.1 10/18/2022 09:33 AM       HCG (If Applicable): No results found for: PREGTESTUR, PREGSERUM, HCG, HCGQUANT     ABGs: No results found for: PHART, PO2ART, UWW2RUP, DON8XRL,

## 2022-10-18 NOTE — OR NURSING
TRANSFER - OUT REPORT:    Verbal report given to KRISH Mcnair on Magaly Newton  being transferred to CCU room 3308 for routine progression of patient care after recovered by PACU nurses. Report consisted of patient's Situation, Background, Assessment and   Recommendations(SBAR). Information from the following report(s) Nurse Handoff Report, Surgery Report, Intake/Output and MAR was reviewed with the receiving nurse. Lines:   Peripheral IV 70/14/27 Left Basilic (Active)   Site Assessment Bleeding 10/18/22 1100   Line Status Blood return noted; Flushed;Normal saline locked; Capped 10/18/22 1100   Line Care Connections checked and tightened;Cap changed 10/18/22 1100   Phlebitis Assessment No symptoms 10/18/22 1100   Infiltration Assessment 0 10/18/22 1100   Alcohol Cap Used Yes 10/18/22 1100   Dressing Status Clean, dry & intact 10/18/22 1100   Dressing Type Transparent 10/18/22 1100       Peripheral IV 10/16/22 Left; Anterior Forearm (Active)   Site Assessment Bleeding 10/18/22 1100   Line Status Infusing 10/18/22 1100   Line Care Connections checked and tightened;Cap changed 10/18/22 1100   Phlebitis Assessment No symptoms 10/18/22 1100   Infiltration Assessment 0 10/18/22 1100   Alcohol Cap Used Yes 10/18/22 1100   Dressing Status Clean, dry & intact 10/18/22 1100   Dressing Type Transparent 10/18/22 1100   Dressing Intervention Dressing changed 10/18/22 0680        Opportunity for questions and clarification was provided.       Patient transported with:  PACU/Transport

## 2022-10-18 NOTE — PROGRESS NOTES
Initial visit made to patient and a prayer was provided. The patient appeared to be resting.         Marcos Walker, [address] [facility], [facility]

## 2022-10-18 NOTE — ANESTHESIA PROCEDURE NOTES
Airway  Date/Time: 10/18/2022 2:39 PM  Urgency: elective      General Information and Staff    Patient location during procedure: procedure area  Resident/CRNA: FLOR Rutherford CRNA  Performed: resident/CRNA     Indications and Patient Condition  Indications for airway management: anesthesia  Spontaneous Ventilation: absent  Sedation level: deep  Preoxygenated: yes  Patient position: sniffing  MILS maintained throughout  Mask difficulty assessment: vent by bag mask + OA or adjuvant +/- NMBA    Final Airway Details  Final airway type: endotracheal airway      Successful airway: ETT  Cuffed: yes   Successful intubation technique: direct laryngoscopy  Facilitating devices/methods: intubating stylet  Endotracheal tube insertion site: oral  Blade: Nga  Blade size: #4  ETT size (mm): 7.5  Cormack-Lehane Classification: grade IIa - partial view of glottis  Placement verified by: chest auscultation and capnometry   Measured from: teeth  ETT to teeth (cm): 25  Number of attempts at approach: 1  Ventilation between attempts: bag mask  Number of other approaches attempted: 0

## 2022-10-18 NOTE — ANESTHESIA POSTPROCEDURE EVALUATION
Department of Anesthesiology  Postprocedure Note    Patient: Alexandr Alfredo  MRN: [id number]  [place]: 1965  Date of evaluation: 10/18/2022      Procedure Summary     Date: 10/18/22 Room / Location: [address] [place] [address]    Anesthesia Start: 8582 Anesthesia Stop: 1657    Procedure: IR THROMB [address] [facility] ART SUBS VESSEL Diagnosis: (Lower extremity DVT)    Scheduled Providers: Deidra Fernandez MD; FLOR Childress - CRNA Responsible Provider: Sue Lopez MD    Anesthesia Type: general ASA Status: 2          Anesthesia Type: No value filed.     Thania Phase I: Thania Score: 10    Thania Phase II:        Anesthesia Post Evaluation    Patient location during evaluation: PACU  Patient participation: complete - patient participated  Level of consciousness: awake and alert  Airway patency: patent  Nausea & Vomiting: no nausea and no vomiting  Complications: no  Cardiovascular status: hemodynamically stable  Respiratory status: acceptable, nonlabored ventilation and spontaneous ventilation  Hydration status: euvolemic  Comments: BP (!) 153/73   Pulse 51   Temp 98.6 °F (37 °C) (Oral)   Resp 14   Ht 6' 4\" (1.93 m)   Wt 204 lb 5.9 oz (92.7 kg)   SpO2 100%   BMI 24.88 kg/m²     Multimodal analgesia pain management approach

## 2022-10-18 NOTE — PROGRESS NOTES
Occupational Therapy Note:    Attempted to see patient this AM for physical therapy evaluation session. Per RN pt with plan for procedure today so will hold. Will follow and re-attempt as schedule permits/patient available.  Thank you,    Ced Fernando OT    Rehab Caseload Tracker

## 2022-10-18 NOTE — H&P
Patient transferred from [facility] for IR thrombectomy in AM.  See initial H&P 10/15 for pmxh, psx, family hx - no changes.

## 2022-10-18 NOTE — OR NURSING
TRANSFER - OUT REPORT:   PACU nurses  Verbal report given to [address] and Jagruti Stade on Татьяна Gutierrez  being transferred to PACU nurses care for routine post-op       Report consisted of patient's Situation, Background, Assessment and   Recommendations(SBAR). Information from the following report(s) Nurse Handoff Report, Surgery Report, Intake/Output and MAR was reviewed with the receiving nurse. Lines:   Peripheral IV 34/25/96 Left Basilic (Active)   Site Assessment Bleeding 10/18/22 1100   Line Status Blood return noted; Flushed;Normal saline locked; Capped 10/18/22 1100   Line Care Connections checked and tightened;Cap changed 10/18/22 1100   Phlebitis Assessment No symptoms 10/18/22 1100   Infiltration Assessment 0 10/18/22 1100   Alcohol Cap Used Yes 10/18/22 1100   Dressing Status Clean, dry & intact 10/18/22 1100   Dressing Type Transparent 10/18/22 1100       Peripheral IV 10/16/22 Left; Anterior Forearm (Active)   Site Assessment Bleeding 10/18/22 1100   Line Status Infusing 10/18/22 1100   Line Care Connections checked and tightened;Cap changed 10/18/22 1100   Phlebitis Assessment No symptoms 10/18/22 1100   Infiltration Assessment 0 10/18/22 1100   Alcohol Cap Used Yes 10/18/22 1100   Dressing Status Clean, dry & intact 10/18/22 1100   Dressing Type Transparent 10/18/22 1100   Dressing Intervention Dressing changed 10/18/22 1273        Opportunity for questions and clarification was provided. Patient transported with:  NO transport-they came to Siemens room.

## 2022-10-18 NOTE — PROGRESS NOTES
TRANSFER - IN REPORT:    Verbal report received from PACU ([facility]) and IR RN on [facility]  being received from PACU for routine progression of patient care      Report consisted of patient's Situation, Background, Assessment and   Recommendations(SBAR). Information from the following report(s) Nurse Handoff Report, Index, MAR, and Cardiac Rhythm SB  was reviewed with the receiving nurse. Opportunity for questions and clarification was provided. Assessment completed upon patient's arrival to unit and care assumed.

## 2022-10-18 NOTE — INTERDISCIPLINARY ROUNDS
Multi-D Rounds/Checklist (leapfrog):  Lines: can any be removed?: None      Venous Sheath 10/17/22 Right (Active)     DVT Prophylaxis: Contraindicated  Vent: N/A  Nutrition Ordered/appropriate: Ordered  Can antibiotics or other drugs be stopped? Is Nozin performed: Yes/End Date set  Consults needed: None  A: Is pain control adequate? (has PRNs? Stop drip?) Yes  B: Sedation break and SBT? N/A  C: Is sedation choice appropriate? N/A  D: Delirium/CAM-ICU? No  E: Mobility goals/appropriateness? Yes  F: Family update and plan? Mother is primary contact and is being updated daily by primary attending and nursing staff.     Kelley Marcelino, APRN - CNP

## 2022-10-18 NOTE — PROGRESS NOTES
Bedside and verbal shift change report received from  [place] (offgoing nurse). Report included the following information SBAR, Kardex, Intake/Output, MAR, Recent Results, Med Rec Status, Cardiac Rhythm SR/SB, Alarm Parameters , Quality Measures, and Dual Neuro Assessment.      Dual skin assessment completed at bedside: skin intact, left leg post IR c/d/i (list pertinent skin assessment findings)    Dual verification of gtts completed (name of gtts verified):   Heparin gtt @ 18

## 2022-10-18 NOTE — PROGRESS NOTES
Pt arrived to 3308 from IR. Sandoval in place and urine bloody, no clots noticed. HR decreasing as low as 45. Dr. Faustino Gardner notified. Dr. Pamela Lancaster at bedside and to place new orders.

## 2022-10-18 NOTE — PROGRESS NOTES
Hospitalist Progress Note   Admit Date:  10/17/2022  6:05 PM   Name:  Magaly Blue   Age:  56 y.o. Sex:  male  :  1965   MRN:  [id number]   Room:  [facility]8/01    Presenting Complaint: No chief complaint on file. Reason(s) for Admission: Acute deep vein thrombosis (DVT) of right lower extremity, unspecified vein [facility]) [I82.401]     Hospital Course:       Mr. Rosie Kamara is a 55 yo male with PMH of polycythemia vera, s/p remote IVC filter placed and retained, recent UTI on bactrim, admitted with LLE edema, UTI. LLE duplex shows extensive LLE DVT. CTAP \"     Impression   1. Infrarenal IVC filter with suggestion of large thrombus burden distal to the   IVC filter and prominent perivascular stranding left greater than right. In   addition, some of the intra device thrombus appears to extend superior/proximal   to the filter. 2.  Findings likely representing cystitis with features concerning for ascending   infection. No CT evidence of pyelonephritis. \"    Started on heparin. He has been seen by IR /hematology. Pending beta 2 microglobulin and anticardiolipin AB- if negative then can change to eliquis or xarelto at discharge for life long anticoagulation. MALIK 2 negative. Erythropoietin negative. S/p 10-17-21 IR EKOS. Plans for 10-18-22 thrombectomy. He has been febrile. Recent UTI on bactrim. Placed on rocephin, UA negative. No urine culture. Discharge plans pending to home.        Subjective & 24hr Events (10/18/22):       NPO for procedure, had some \"kidney pain\", no dysuria, no dyspnea, has cough,       Assessment & Plan:     LLE DVT and thrombosed IVC filter:  IR following on EKOS  Plans for thrombectomy 10-18-22   Hematology following   On IV heparin and will need oral agent at discharge   Followup beta 2 microglobulin and anticardiolipin AB      Cystitis:  On rocephin  UA negative  Renal US ordered   Check blood cultures as still febrile         Fever:  RVP- flu a positive  Droplet plus isolation  CXR negative  Check blood cultures             Anticipated discharge needs:      Pending     Diet:  Diet NPO  DVT PPx: IV heparin   Code status: Full Code    Hospital Problems:  Active Problems:    * No active hospital problems. *  Resolved Problems:    * No resolved hospital problems.  *      Objective:   Patient Vitals for the past 24 hrs:   Temp Pulse Resp BP SpO2   10/18/22 1148 99.5 °F (37.5 °C) 58 11 113/68 --   10/18/22 1100 (!) 101.3 °F (38.5 °C) 62 11 117/71 93 %   10/18/22 1030 -- 67 (!) 8 113/67 92 %   10/18/22 1000 -- 57 14 130/67 96 %   10/18/22 0936 (!) 102.4 °F (39.1 °C) -- -- -- --   10/18/22 0930 -- 56 22 134/78 96 %   10/18/22 0900 -- 55 11 (!) 146/76 96 %   10/18/22 0830 -- 73 21 138/78 95 %   10/18/22 0800 (!) 100.9 °F (38.3 °C) 63 28 (!) 141/81 96 %   10/18/22 0730 -- 62 27 (!) 140/76 97 %   10/18/22 0700 (!) 100.7 °F (38.2 °C) 74 28 128/69 94 %   10/18/22 0400 98.8 °F (37.1 °C) 73 21 128/77 98 %   10/18/22 0330 -- 65 21 125/74 97 %   10/18/22 0300 -- 72 12 117/76 95 %   10/18/22 0230 -- 76 (!) 31 120/69 97 %   10/18/22 0200 99.5 °F (37.5 °C) 77 24 111/66 (!) 89 %   10/18/22 0130 -- 79 21 112/62 (!) 89 %   10/18/22 0100 -- 88 10 (!) 116/56 93 %   10/18/22 0030 -- 96 16 (!) 119/59 90 %   10/18/22 0000 (!) 102.9 °F (39.4 °C) 84 15 133/69 94 %   10/17/22 2330 -- 88 22 126/67 92 %   10/17/22 2300 -- 94 28 133/68 (!) 88 %   10/17/22 2230 -- 88 27 132/64 90 %   10/17/22 2200 -- 82 27 (!) 143/75 92 %   10/17/22 2130 -- 89 21 (!) 148/91 97 %   10/17/22 2100 -- 86 26 (!) 149/76 94 %   10/17/22 2030 99.7 °F (37.6 °C) 81 25 (!) 144/84 96 %   10/17/22 1920 -- 90 16 (!) 163/92 97 %   10/17/22 1905 -- 78 -- (!) 174/97 94 %   10/17/22 1850 -- 81 -- (!) 147/82 94 %   10/17/22 1835 -- 81 -- (!) 149/88 94 %   10/17/22 1820 -- 82 20 (!) 147/83 93 %   10/17/22 1805 98.2 °F (36.8 °C) 76 20 (!) 140/86 95 %   10/17/22 1717 -- -- -- (!) 162/99 --   10/17/22 1715 -- 80 -- -- 99 % 10/17/22 1712 -- -- -- (!) 150/87 --   10/17/22 1710 -- 68 -- -- 100 %   10/17/22 1706 -- -- -- (!) 145/91 --   10/17/22 1705 -- 77 -- -- 100 %   10/17/22 1701 -- 64 15 (!) 144/100 100 %   10/17/22 1700 -- 69 -- -- 100 %   10/17/22 1656 -- 75 16 (!) 151/95 100 %   10/17/22 1655 -- 76 -- -- 100 %   10/17/22 1651 -- 70 16 (!) 153/91 100 %   10/17/22 1649 -- 76 -- -- 100 %   10/17/22 1646 -- 75 15 (!) 140/89 100 %   10/17/22 1644 -- 79 -- -- 100 %   10/17/22 1642 -- 77 17 (!) 142/90 100 %   10/17/22 1639 -- 69 -- -- 100 %   10/17/22 1637 -- 60 16 131/89 100 %   10/17/22 1634 -- 78 -- -- 100 %   10/17/22 1632 -- 83 16 139/87 100 %   10/17/22 1629 -- 70 -- -- 100 %   10/17/22 1627 -- 80 18 134/85 100 %   10/17/22 1624 -- 81 -- -- 99 %   10/17/22 1622 -- 74 20 (!) 143/91 99 %   10/17/22 1620 -- 75 -- -- 100 %   10/17/22 1617 -- 79 22 (!) 163/97 100 %   10/17/22 1616 -- -- -- -- 99 %   10/17/22 1607 -- 91 -- -- 94 %   10/17/22 1605 -- 88 -- -- 94 %   10/17/22 1600 -- 79 -- -- 96 %   10/17/22 1555 -- 85 -- -- 95 %   10/17/22 1549 -- 81 -- -- 92 %   10/17/22 1544 -- 82 -- -- 93 %   10/17/22 1539 -- 80 -- -- 93 %   10/17/22 1534 -- 80 -- -- 94 %   10/17/22 1529 -- 81 -- -- 95 %   10/17/22 1524 -- 79 -- -- 96 %   10/17/22 1520 -- 86 -- -- 99 %   10/17/22 1515 -- 94 -- -- 95 %   10/17/22 1510 -- 83 -- -- 98 %       Oxygen Therapy  SpO2: 93 %  Pulse Oximetry Type: Continuous  Pulse via Oximetry: 62 beats per minute  Pulse Oximeter Device Mode: Continuous  Pulse Oximeter Device Location: Left, Finger  O2 Device: None (Room air)  Oximetry Probe Site Changed: Yes  Skin Assessment: Clean, dry, & intact  Skin Protection for O2 Device: N/A  O2 Flow Rate (L/min): 2 L/min  End Tidal CO2: 44 (%)  Oxygen Therapy: None (Room air)    Estimated body mass index is 24.88 kg/m² as calculated from the following:    Height as of this encounter: 6' 4\" (1.93 m). Weight as of this encounter: 204 lb 5.9 oz (92.7 kg).     Intake/Output Summary (Last 24 hours) at 10/18/2022 1501  Last data filed at 10/18/2022 1130  Gross per 24 hour   Intake 1326.86 ml   Output 1775 ml   Net -448.14 ml         Physical Exam:     Blood pressure 113/68, pulse 58, temperature 99.5 °F (37.5 °C), temperature source Oral, resp. rate 11, height 6' 4\" (1.93 m), weight 204 lb 5.9 oz (92.7 kg), SpO2 93 %. General:    Well nourished. Alert, no distress   CV:   RRR. No m/r/g. No jugular venous distension. 1+ LLE edema  Lungs:   CTAB. No wheezing, rhonchi, or rales. Symmetric expansion. Abdomen: Bowel sounds present. Soft, nontender, nondistended. Extremities: No cyanosis or clubbing. Skin:     No rashes and normal coloration. Warm and dry. Neuro:  grossly intact. Psych:  Normal mood and affect.       I have personally reviewed labs and tests showing:  Recent Labs:  Recent Results (from the past 48 hour(s))   Anti-Xa, Unfractionated Heparin    Collection Time: 10/16/22  6:26 PM   Result Value Ref Range    Anti-XA Unfrac Heparin 0.35 0.3 - 0.7 IU/mL   CBC    Collection Time: 10/17/22  5:36 AM   Result Value Ref Range    WBC 6.8 4.3 - 11.1 K/uL    RBC 3.45 (L) 4.23 - 5.6 M/uL    Hemoglobin 11.7 (L) 13.6 - 17.2 g/dL    Hematocrit 33.9 (L) 41.1 - 50.3 %    MCV 98.3 82.0 - 102.0 FL    MCH 33.9 (H) 26.1 - 32.9 PG    MCHC 34.5 31.4 - 35.0 g/dL    RDW 12.6 11.9 - 14.6 %    Platelets 018 045 - 458 K/uL    MPV 9.2 (L) 9.4 - 12.3 FL    nRBC 0.00 0.0 - 0.2 K/uL   Anti-Xa, Unfractionated Heparin    Collection Time: 10/17/22  5:36 AM   Result Value Ref Range    Anti-XA Unfrac Heparin 0.30 0.3 - 0.7 IU/mL   APTT    Collection Time: 10/17/22 10:22 PM   Result Value Ref Range    PTT 52.6 (H) 24.1 - 35.1 SEC   Fibrinogen    Collection Time: 10/17/22 10:22 PM   Result Value Ref Range    Fibrinogen 655 (H) 190 - 501 mg/dL   CBC    Collection Time: 10/17/22 10:22 PM   Result Value Ref Range    WBC 5.6 4.3 - 11.1 K/uL    RBC 3.63 M/uL    Hemoglobin 12.3 (L) 13.6 - 17.2 g/dL    Hematocrit 36.1 (L) 41.1 - 50.3 %    MCV 99.4 82.0 - 102.0 FL    MCH 33.9 (H) 26.1 - 32.9 PG    MCHC 34.1 31.4 - 35.0 g/dL    RDW 12.6 %    Platelets 879 K/uL    MPV 9.6 9.4 - 12.3 FL    nRBC 0.00 K/uL   APTT    Collection Time: 10/18/22  3:55 AM   Result Value Ref Range    PTT 61.1 (H) 24.1 - 35.1 SEC   CBC    Collection Time: 10/18/22  3:55 AM   Result Value Ref Range    WBC 5.6 4.3 - 11.1 K/uL    RBC 3.79 M/uL    Hemoglobin 12.7 (L) 13.6 - 17.2 g/dL    Hematocrit 38.2 (L) 41.1 - 50.3 %    .8 82.0 - 102.0 FL    MCH 33.5 (H) 26.1 - 32.9 PG    MCHC 33.2 31.4 - 35.0 g/dL    RDW 12.9 %    Platelets 491 K/uL    MPV 10.1 9.4 - 12.3 FL   Fibrinogen    Collection Time: 10/18/22  3:55 AM   Result Value Ref Range    Fibrinogen 314 190 - 501 mg/dL   Protime-INR    Collection Time: 10/18/22  3:55 AM   Result Value Ref Range    Protime 17.4 (H) 12.6 - 14.5 sec    INR 1.4     APTT    Collection Time: 10/18/22  9:33 AM   Result Value Ref Range    PTT 64.1 (H) 24.1 - 35.1 SEC   CBC    Collection Time: 10/18/22  9:33 AM   Result Value Ref Range    WBC 5.3 4.3 - 11.1 K/uL    RBC 3.57 (L) 4.23 - 5.6 M/uL    Hemoglobin 12.2 (L) 13.6 - 17.2 g/dL    Hematocrit 36.3 (L) 41.1 - 50.3 %    .7 82 - 102 FL    MCH 34.2 (H) 26.1 - 32.9 PG    MCHC 33.6 31.4 - 35.0 g/dL    RDW 12.9 11.9 - 14.6 %    Platelets 131 881 - 127 K/uL    MPV 9.9 9.4 - 12.3 FL    nRBC 0.00 0.0 - 0.2 K/uL   Fibrinogen    Collection Time: 10/18/22  9:33 AM   Result Value Ref Range    Fibrinogen 158 (L) 190 - 501 mg/dL   Urinalysis w rflx microscopic    Collection Time: 10/18/22 11:22 AM   Result Value Ref Range    Color, UA YELLOW/STRAW      Appearance CLEAR      Specific Gravity, UA 1.015 1.001 - 1.023      pH, Urine 7.0 5.0 - 9.0      Protein,  (A) NEG mg/dL    Glucose, UA Negative mg/dL    Ketones, Urine TRACE (A) NEG mg/dL    Bilirubin Urine Negative NEG      Blood, Urine Negative NEG      Urobilinogen, Urine 1.0 0.2 - 1.0 EU/dL    Nitrite, Urine Negative NEG Leukocyte Esterase, Urine Negative NEG      RBC, UA 0-3 0 /hpf    BACTERIA, URINE 0 0 /hpf   Respiratory Panel, Molecular, with COVID-19 (Restricted: peds pts or suitable admitted adults)    Collection Time: 10/18/22 11:22 AM    Specimen: Nasopharyngeal   Result Value Ref Range    Adenovirus by PCR NOT DETECTED NOTDET      Coronavirus 229E by PCR NOT DETECTED NOTDET      Coronavirus HKU1 by PCR NOT DETECTED NOTDET      Coronavirus NL63 by PCR NOT DETECTED NOTDET      Coronavirus OC43 by PCR NOT DETECTED NOTDET      SARS-CoV-2, PCR NOT DETECTED NOTDET      Human Metapneumovirus by PCR NOT DETECTED NOTDET      Rhinovirus Enterovirus PCR NOT DETECTED NOTDET      Influenza A H3 by PCR Detected (A) NOTDET      Influenza B PCR NOT DETECTED NOTDET      Parainfluenza 1 PCR NOT DETECTED NOTDET      Parainfluenza 2 PCR NOT DETECTED NOTDET      Parainfluenza 3 PCR NOT DETECTED NOTDET      Parainfluenza 4 PCR NOT DETECTED NOTDET      Respiratory Syncytial Virus by PCR NOT DETECTED NOTDET      Bordetella parapertussis by PCR NOT DETECTED NOTDET      Bordetella pertussis by PCR NOT DETECTED NOTDET      Chlamydophila Pneumonia PCR NOT DETECTED NOTDET      Mycoplasma pneumo by PCR NOT DETECTED NOTDET     POCT activated clotting time    Collection Time: 10/18/22  2:46 PM   Result Value Ref Range    Activated Clotting Time 109 70 - 128 SECS       I have personally reviewed imaging studies showing: Other Studies:  US RETROPERITONEAL COMPLETE   Final Result   1. No hydronephrosis of the kidneys to indicate acute obstruction. XR CHEST PORTABLE   Final Result   The lungs are clear. The heart is normal in size. No pneumothorax. No pleural effusions. Proximal left humeral fixation hardware is partially   imaged. Posttraumatic changes distal left clavicle. Old healed posterior lateral   left fourth rib fracture deformity also noted. IR GUIDED THROMB DAUTERIVE [facility] VEIN   Final Result   1.  Extensive DGI of the left lower extremity, pelvis and IVC to the level of the   IVC filter. 2. After placement of an ultrasound assisted thrombolysis infusion catheter,   pharmacologic thrombolysis will be initiated. .      Plan: ICU observation. The patient will return the following day for repeat   evaluation and probable further intervention to any residual thrombus and/or   stenoses.       IR GUIDED THROMB [facility] NONCOR ART SBSQ VESSEL    (Results Pending)       Current Meds:  Current Facility-Administered Medications   Medication Dose Route Frequency    LORazepam (ATIVAN) tablet 1 mg  1 mg Oral BID PRN    0.9 % sodium chloride infusion   IntraCATHeter Continuous    heparin 25,000 units in dextrose 5% 250 mL (premix) infusion  400 Units/hr IntraCATHeter Continuous    alteplase (CATHFLO) 10 mg in sodium chloride 0.9 % 500 mL infusion  1 mg/hr IntraCATHeter Continuous    0.9 % sodium chloride infusion   IntraVENous Continuous    sodium chloride flush 0.9 % injection 5-40 mL  5-40 mL IntraVENous 2 times per day    sodium chloride flush 0.9 % injection 5-40 mL  5-40 mL IntraVENous PRN    0.9 % sodium chloride infusion   IntraVENous PRN    ondansetron (ZOFRAN-ODT) disintegrating tablet 4 mg  4 mg Oral Q8H PRN    Or    ondansetron (ZOFRAN) injection 4 mg  4 mg IntraVENous Q6H PRN    polyethylene glycol (GLYCOLAX) packet 17 g  17 g Oral Daily PRN    acetaminophen (TYLENOL) tablet 650 mg  650 mg Oral Q6H PRN    Or    acetaminophen (TYLENOL) suppository 650 mg  650 mg Rectal Q6H PRN    magnesium sulfate 2000 mg in 50 mL IVPB premix  2,000 mg IntraVENous PRN    potassium chloride 10 mEq/100 mL IVPB (Peripheral Line)  10 mEq IntraVENous PRN    potassium chloride (KLOR-CON M) extended release tablet 40 mEq  40 mEq Oral PRN    Or    potassium bicarb-citric acid (EFFER-K) effervescent tablet 40 mEq  40 mEq Oral PRN    Or    potassium chloride 10 mEq/100 mL IVPB (Peripheral Line)  10 mEq IntraVENous PRN    cefTRIAXone (ROCEPHIN) 1,000 mg in sodium chloride 0.9 % 50 mL IVPB mini-bag  1,000 mg IntraVENous Q24H    gabapentin (NEURONTIN) capsule 600 mg  600 mg Oral TID    morphine injection 2 mg  2 mg IntraVENous Q4H PRN     Facility-Administered Medications Ordered in Other Encounters   Medication Dose Route Frequency    lactated ringers infusion   IntraVENous Continuous PRN    propofol injection   IntraVENous PRN    lidocaine PF 2 % injection   IntraVENous PRN    rocuronium (ZEMURON) injection   IntraVENous PRN    ePHEDrine injection   IntraVENous PRN    heparin (porcine) injection   IntraVENous PRN       Signed:  Liz Marquez MD    Part of this note may have been written by using a voice dictation software. The note has been proof read but may still contain some grammatical/other typographical errors.

## 2022-10-18 NOTE — PROGRESS NOTES
Physical Therapy Note:    Attempted to see patient this AM for physical therapy evaluation session. Per RN pt with plan for procedure today so will hold. Will follow and re-attempt as schedule permits/patient available. Thank you,    Indu Crenshaw.  Trinity Dave     Rehab Caseload Tracker

## 2022-10-19 LAB
ANION GAP SERPL CALC-SCNC: 8 MMOL/L (ref 2–11)
B2 GLYCOPROT1 IGG SER-ACNC: <9 GPI IGG UNITS (ref 0–20)
B2 GLYCOPROT1 IGM SER-ACNC: <9 GPI IGM UNITS (ref 0–32)
BUN SERPL-MCNC: 7 MG/DL (ref 6–23)
CALCIUM SERPL-MCNC: 8.8 MG/DL (ref 8.3–10.4)
CHLORIDE SERPL-SCNC: 106 MMOL/L (ref 101–110)
CO2 SERPL-SCNC: 26 MMOL/L (ref 21–32)
CREAT SERPL-MCNC: 0.7 MG/DL (ref 0.8–1.5)
ERYTHROCYTE [DISTWIDTH] IN BLOOD BY AUTOMATED COUNT: 12.6 % (ref 11.9–14.6)
GLUCOSE SERPL-MCNC: 105 MG/DL (ref 65–100)
HCT VFR BLD AUTO: 41.3 % (ref 41.1–50.3)
HGB BLD-MCNC: 13.5 G/DL (ref 13.6–17.2)
MCH RBC QN AUTO: 33.3 PG (ref 26.1–32.9)
MCHC RBC AUTO-ENTMCNC: 32.7 G/DL (ref 31.4–35)
MCV RBC AUTO: 102 FL (ref 82–102)
NRBC # BLD: 0 K/UL (ref 0–0.2)
PLATELET # BLD AUTO: 308 K/UL (ref 150–450)
PMV BLD AUTO: 10.2 FL (ref 9.4–12.3)
POTASSIUM SERPL-SCNC: 4.1 MMOL/L (ref 3.5–5.1)
RBC # BLD AUTO: 4.05 M/UL (ref 4.23–5.6)
SODIUM SERPL-SCNC: 140 MMOL/L (ref 133–143)
UFH PPP CHRO-ACNC: 0.33 IU/ML (ref 0.3–0.7)
WBC # BLD AUTO: 6 K/UL (ref 4.3–11.1)

## 2022-10-19 PROCEDURE — 6370000000 HC RX 637 (ALT 250 FOR IP): Performed by: STUDENT IN AN ORGANIZED HEALTH CARE EDUCATION/TRAINING PROGRAM

## 2022-10-19 PROCEDURE — 97530 THERAPEUTIC ACTIVITIES: CPT

## 2022-10-19 PROCEDURE — 6370000000 HC RX 637 (ALT 250 FOR IP): Performed by: INTERNAL MEDICINE

## 2022-10-19 PROCEDURE — 36415 COLL VENOUS BLD VENIPUNCTURE: CPT

## 2022-10-19 PROCEDURE — 2500000003 HC RX 250 WO HCPCS: Performed by: INTERNAL MEDICINE

## 2022-10-19 PROCEDURE — 6360000002 HC RX W HCPCS: Performed by: INTERNAL MEDICINE

## 2022-10-19 PROCEDURE — 85027 COMPLETE CBC AUTOMATED: CPT

## 2022-10-19 PROCEDURE — 6370000000 HC RX 637 (ALT 250 FOR IP): Performed by: FAMILY MEDICINE

## 2022-10-19 PROCEDURE — 2580000003 HC RX 258: Performed by: STUDENT IN AN ORGANIZED HEALTH CARE EDUCATION/TRAINING PROGRAM

## 2022-10-19 PROCEDURE — 97112 NEUROMUSCULAR REEDUCATION: CPT

## 2022-10-19 PROCEDURE — 97161 PT EVAL LOW COMPLEX 20 MIN: CPT

## 2022-10-19 PROCEDURE — 6360000002 HC RX W HCPCS: Performed by: STUDENT IN AN ORGANIZED HEALTH CARE EDUCATION/TRAINING PROGRAM

## 2022-10-19 PROCEDURE — 80048 BASIC METABOLIC PNL TOTAL CA: CPT

## 2022-10-19 PROCEDURE — 85520 HEPARIN ASSAY: CPT

## 2022-10-19 PROCEDURE — 2580000003 HC RX 258: Performed by: RADIOLOGY

## 2022-10-19 PROCEDURE — 97165 OT EVAL LOW COMPLEX 30 MIN: CPT

## 2022-10-19 PROCEDURE — 97164 PT RE-EVAL EST PLAN CARE: CPT

## 2022-10-19 PROCEDURE — 1100000000 HC RM PRIVATE

## 2022-10-19 RX ADMIN — SODIUM CHLORIDE, PRESERVATIVE FREE 10 ML: 5 INJECTION INTRAVENOUS at 09:31

## 2022-10-19 RX ADMIN — TUBERCULIN PURIFIED PROTEIN DERIVATIVE 5 UNITS: 5 INJECTION, SOLUTION INTRADERMAL at 16:25

## 2022-10-19 RX ADMIN — GABAPENTIN 600 MG: 300 CAPSULE ORAL at 09:30

## 2022-10-19 RX ADMIN — GABAPENTIN 600 MG: 300 CAPSULE ORAL at 21:12

## 2022-10-19 RX ADMIN — SODIUM CHLORIDE: 9 INJECTION, SOLUTION INTRAVENOUS at 04:39

## 2022-10-19 RX ADMIN — OSELTAMIVIR PHOSPHATE 75 MG: 75 CAPSULE ORAL at 21:12

## 2022-10-19 RX ADMIN — GABAPENTIN 600 MG: 300 CAPSULE ORAL at 14:53

## 2022-10-19 RX ADMIN — SODIUM CHLORIDE, PRESERVATIVE FREE 10 ML: 5 INJECTION INTRAVENOUS at 21:12

## 2022-10-19 RX ADMIN — CEFTRIAXONE 1000 MG: 1 INJECTION, POWDER, FOR SOLUTION INTRAMUSCULAR; INTRAVENOUS at 16:49

## 2022-10-19 RX ADMIN — HEPARIN SODIUM 18 UNITS/KG/HR: 10000 INJECTION, SOLUTION INTRAVENOUS at 10:28

## 2022-10-19 RX ADMIN — OSELTAMIVIR PHOSPHATE 75 MG: 75 CAPSULE ORAL at 09:30

## 2022-10-19 RX ADMIN — PHENOL 1 SPRAY: 1.5 LIQUID ORAL at 04:41

## 2022-10-19 ASSESSMENT — PAIN SCALES - GENERAL
PAINLEVEL_OUTOF10: 0
PAINLEVEL_OUTOF10: 0
PAINLEVEL_OUTOF10: 8
PAINLEVEL_OUTOF10: 0

## 2022-10-19 ASSESSMENT — PAIN DESCRIPTION - LOCATION: LOCATION: GENERALIZED

## 2022-10-19 NOTE — PROGRESS NOTES
Called and spoke with RN. We were consulted for gross hematuria/cystitis. Pt is flu+. UA negative for infection. Cr 0.70, GFR >60. Coleman was placed yesterday prior to procedure in IR. He was not in retention per nurse. Urine then became blood tinged with no clots. Pt on hep drip. Overnight, urine has cleared nicely per nurse, still with no clots. If urine continues to clear, would remove coleman once pt out of ICU. CT AP with IV contrast mentions:  KIDNEYS/BLADDER: The kidneys are symmetric in size. No renal calculus or   hydronephrosis. No renal mass. Diffuse bladder wall thickening irregular   appearance and adjacent stranding. There is ureteral wall hyperenhancement and   periureteral stranding. Uniform enhancement of the kidneys. He will need an outpt cystoscopy in the next few weeks once he is over current illness to complete hematuria workup, we will get that arranged. I have reviewed the above note. I agree with the HPI, exam, assessment and plan as outlined by the nurse practitioner. Tony Junior M.D.     [facility]  [place] [address] [address]  Phone: [phone]  Fax: [phone]

## 2022-10-19 NOTE — PROGRESS NOTES
TRANSFER - OUT REPORT:    Verbal report given to Jeni Ingram RN on Татьяна Gutierrez  being transferred to [address] [place] [address] for routine progression of patient care       Report consisted of patient's Situation, Background, Assessment and   Recommendations(SBAR). Information from the following report(s) Nurse Handoff Report, Index, MAR, Recent Results, and Cardiac Rhythm SB  was reviewed with the receiving nurse. Lines:   Peripheral IV 10/16/22 Left; Anterior Forearm (Active)   Site Assessment Bleeding 10/19/22 1100   Line Status Infusing 10/19/22 1100   Line Care Connections checked and tightened;Cap changed 10/19/22 1100   Phlebitis Assessment No symptoms 10/19/22 1100   Infiltration Assessment 0 10/19/22 1100   Alcohol Cap Used Yes 10/19/22 1100   Dressing Status Clean, dry & intact 10/19/22 1100   Dressing Type Transparent 10/19/22 1100   Dressing Intervention Dressing changed 10/18/22 0307       Peripheral IV 10/18/22 Right Hand (Active)   Site Assessment Clean, dry & intact 10/19/22 1100   Line Status Capped;Flushed;Normal saline locked 10/19/22 1100   Line Care Connections checked and tightened;Cap changed;Ports disinfected 10/19/22 1100   Phlebitis Assessment No symptoms 10/19/22 1100   Infiltration Assessment 0 10/19/22 1100   Alcohol Cap Used Yes 10/19/22 1100   Dressing Status Clean, dry & intact 10/19/22 1100   Dressing Type Transparent 10/19/22 1100        Opportunity for questions and clarification was provided. Patient transported with:  Registered Nurse    Pt notified of transfer, to inform family. Transferred with belongings including shoes, pants, hat, shirt, socks, cell phone, glasses and .

## 2022-10-19 NOTE — PROGRESS NOTES
ACUTE PHYSICAL THERAPY GOALS:   (Developed with and agreed upon by patient and/or caregiver. )  LTG:  (1.)Mr. Olvin Unger will move from supine to sit and sit to supine , scoot up and down, and roll side to side in bed with MODIFIED INDEPENDENCE within 7 treatment day(s). (2.)Mr. Olvin Unger will transfer from bed to chair and chair to bed with STAND BY ASSIST using the least restrictive device within 7 treatment day(s). (3.)Mr. Antoine will ambulate with CONTACT GUARD ASSIST for 100 feet with the least restrictive device within 7 treatment day(s). (4.)Mr. Olvin Unger will participate in therapeutic activity/exercises x 25 minutes for increased strength within 7 treatment days. (5.)Mr. Antoine will perform standing static and dynamic balance activities x 15 minutes with STAND BY ASSIST to improve safety within 7 day(s). ________________________________________________________________________________________________        PHYSICAL THERAPY Re-evaluation and AM  (Link to Caseload Tracking: PT Visit Days : 1  Acknowledge Orders  Time In/Out  PT Charge Capture  Rehab Caseload Tracker    Vahe Gutierrez is a 56 y.o. male   PRIMARY DIAGNOSIS: <principal problem not specified>  Acute deep vein thrombosis (DVT) of right lower extremity, unspecified vein (HCC) [I82.401]    2 Days Post-Op  Reason for Referral: Generalized Muscle Weakness (M62.81)  Difficulty in walking, Not elsewhere classified (R26.2)  Inpatient: Payor: Kecia Malloy / Plan: Rony Angel / Product Type: *No Product type* /     ASSESSMENT:     REHAB RECOMMENDATIONS:   Recommendation to date pending progress:  Setting:  Short-term Rehab    Equipment:    To Be Determined     ASSESSMENT:  Mr. Olvin Unger presents to PT with WFL AROM and decreased strength in B LEs. He reported a history of debilitating neuropathy that required him to use rollator all the time PTA. Pt required some motivation for participation today.   He required min-modA x 2/RW for STS and fair standing balance. Pt was able to take side steps at EOB but refused to attempt pivoting to chair due to weakness. Mr. Rosie Kamara could benefit from skilled PT as he is currently functioning below his baseline. [facility] [address] [address] AM-PAC 6 Clicks Basic Mobility Inpatient Short Form  AM-PAC Mobility Inpatient   How much difficulty turning over in bed?: A Little  How much difficulty sitting down on / standing up from a chair with arms?: A Little  How much difficulty moving from lying on back to sitting on side of bed?: A Little  How much help from another person moving to and from a bed to a chair?: A Lot  How much help from another person needed to walk in hospital room?: A Lot  How much help from another person for climbing 3-5 steps with a railing?: Total  AM-PAC Inpatient Mobility Raw Score : 14  AM-PAC Inpatient T-Scale Score : 38.1  Mobility Inpatient CMS 0-100% Score: 61.29  Mobility Inpatient CMS G-Code Modifier : CL    SUBJECTIVE:   Mr. Rosie Kamara states, \"I'm too weak to do this right now! \"     Social/Functional Lives With: Alone  ADL Assistance: Independent  Homemaking Assistance: Independent  Homemaking Responsibilities: Yes  Ambulation Assistance: Independent  Active :  (unknown)  Lives alone and uses rollator for ambulation  OBJECTIVE:     PAIN: VITALS / O2: Dwyane Coral / Jolena Jewels / DRAINS:   Pre Treatment:   Pain Assessment: 0-10  Pain Level: 8  Pain Location: Generalized      Post Treatment: 8 Vitals        Oxygen      Continuous Pulse Oximetry, Sandoval Catheter, IV, and Telemetry     RESTRICTIONS/PRECAUTIONS:  Restrictions/Precautions: Fall Risk                 GROSS EVALUATION: Intact Impaired (Comments):   AROM [x]     PROM []    Strength []  Generalized weakness observed   Balance []  Fair sitting and fair-poor standing   Posture [] N/A   Sensation []     Coordination []      Tone []     Edema []    Activity Tolerance []  Fatigued quickly     []      COGNITION/  PERCEPTION: Intact Impaired (Comments):   Orientation [x]     Vision []     Hearing [x]     Cognition  [x]       MOBILITY: I Mod I S SBA CGA Min Mod Max Total  NT x2 Comments:   Bed Mobility    Rolling [] [] [] [] [] [] [] [] [] [] []    Supine to Sit [] [] [] [] [] [x] [] [] [] [] [x]    Scooting [] [] [] [] [] [] [] [] [] [] []    Sit to Supine [] [] [] [] [] [] [] [] [] [] []    Transfers    Sit to Stand [] [] [] [] [] [x] [] [] [] [] [x]    Bed to Chair [] [] [] [] [] [] [] [] [] [] []    Stand to Sit [] [] [] [] [] [x] [] [] [] [] [x]     [] [] [] [] [] [] [] [] [] [] []    I=Independent, Mod I=Modified Independent, S=Supervision, SBA=Standby Assistance, CGA=Contact Guard Assistance,   Min=Minimal Assistance, Mod=Moderate Assistance, Max=Maximal Assistance, Total=Total Assistance, NT=Not Tested    GAIT: I Mod I S SBA CGA Min Mod Max Total  NT x2 Comments:   Level of Assistance [] [] [] [] [] [] [] [] [] [x] []    Distance   feet    DME N/A    Gait Quality N/A    Weightbearing Status      Stairs      I=Independent, Mod I=Modified Independent, S=Supervision, SBA=Standby Assistance, CGA=Contact Guard Assistance,   Min=Minimal Assistance, Mod=Moderate Assistance, Max=Maximal Assistance, Total=Total Assistance, NT=Not Tested    PLAN:   FREQUENCY AND DURATION: 3 times/week for duration of hospital stay or until stated goals are met, whichever comes first.    THERAPY PROGNOSIS: Good    PROBLEM LIST:   (Skilled intervention is medically necessary to address:)  Decreased ADL/Functional Activities  Decreased Activity Tolerance  Decreased Balance  Decreased Gait Ability  Decreased Safety Awareness  Decreased Strength  Decreased Transfer Abilities  Increased Pain INTERVENTIONS PLANNED:   (Benefits and precautions of physical therapy have been discussed with the patient.)  Self Care Training  Therapeutic Activity  Therapeutic Exercise/HEP  Neuromuscular Re-education  Gait Training  Education       TREATMENT:   EVALUATION: LOW COMPLEXITY: (Untimed Charge)    TREATMENT:   Co-Treatment PT/OT necessary due to patient's decreased overall endurance/tolerance levels, as well as need for high level skilled assistance to complete functional transfers/mobility and functional tasks  Therapeutic Activity (12 Minutes): Therapeutic activity included Supine to Sit, Scooting, Transfer Training, Sitting balance , Standing balance, and side steps at EOB to improve functional Activity tolerance, Balance, Mobility, and Strength. TREATMENT GRID:  N/A    AFTER TREATMENT PRECAUTIONS: Bed, Bed/Chair Locked, Call light within reach, Needs within reach, RN notified, and Side rails x3    INTERDISCIPLINARY COLLABORATION:  RN/ PCT, PT/ PTA, and OT/ MENJIVAR    EDUCATION: Education Given To: Patient  Education Provided: Role of Therapy    TIME IN/OUT:  Time In: 1110  Time Out: [address]  Minutes: Jermaine 60.  Paulo White

## 2022-10-19 NOTE — INTERDISCIPLINARY ROUNDS
Multi-D Rounds/Checklist (leapfrog):  Lines: can any be removed?: None     Urinary Catheter 10/18/22 Sandoval (Active)     Venous Sheath 10/17/22 Right (Active)     DVT Prophylaxis: Contraindicated  Vent: N/A  Nutrition Ordered/appropriate: Ordered  Can antibiotics or other drugs be stopped? Is Nozin performed: Yes/End Date set  Consults needed: None  A: Is pain control adequate? (has PRNs? Stop drip?) Yes  B: Sedation break and SBT? N/A  C: Is sedation choice appropriate? N/A  D: Delirium/CAM-ICU? No  E: Mobility goals/appropriateness? Yes  F: Family update and plan? Mother is primary contact and is being updated daily by primary attending and nursing staff.     Jannette Robbins, PA

## 2022-10-19 NOTE — CARE COORDINATION
Consult for [place], but PT/OT recommending STR at SNF. List given for pt to decide on 3 facilities. PPD for rehab needs. CM to follow.

## 2022-10-19 NOTE — PROGRESS NOTES
This was a visit to a patient with the flu. I had prayer for the patient outside of the patient's room and I did not enter the room. I received updated information from the patient's nurse concerning this patient's needs. I will continue to follow-up with this patient and offer assistance and prayer.     Lauren Middleton

## 2022-10-19 NOTE — PROGRESS NOTES
Hospitalist Progress Note   Admit Date:  10/17/2022  6:05 PM   Name:  Tish Diego   Age:  56 y.o. Sex:  male  :  1965   MRN:  [id number]   Room:  [facility]/    Presenting Complaint: No chief complaint on file. Reason(s) for Admission: Acute deep vein thrombosis (DVT) of right lower extremity, unspecified vein [facility]) [I82.401]     Hospital Course:       Mr. Matt Roberts is a 55 yo male with PMH of polycythemia vera, s/p remote IVC filter placed and retained, recent UTI on bactrim, admitted with LLE edema, UTI. LLE duplex shows extensive LLE DVT. CTAP \"     Impression   1. Infrarenal IVC filter with suggestion of large thrombus burden distal to the   IVC filter and prominent perivascular stranding left greater than right. In   addition, some of the intra device thrombus appears to extend superior/proximal   to the filter. 2.  Findings likely representing cystitis with features concerning for ascending   infection. No CT evidence of pyelonephritis. \"    Started on heparin. He has been seen by IR /hematology. Pending beta 2 microglobulin and anticardiolipin AB- if negative then can change to eliquis or xarelto at discharge for life long anticoagulation. MALIK 2 negative. Erythropoietin negative. FLOW cytometry pending. S/p 10-17-21 IR EKOS. S/p 10-18-22 thrombectomy and stenting. He has been febrile. Recent UTI on bactrim. Placed on rocephin, UA negative. No urine culture. Negative renal US. Blood cultures negative. CXR clear. Found to have FLU A on RVP. Started tamiflu 10-18-22. Discharge plans pending to home. Subjective & 24hr Events (10/19/22):        Some hematuria, feels poorly in general, has neuropathy and uses walker, no dyspnea, eating       Assessment & Plan:     LLE DVT and thrombosed IVC filter:  S/p EKOS 10-17-22  S/p thrombectomy with stent 10-18-22   Hematology following - will ask for further recommendations 10-19-22   On IV heparin and will need oral agent at discharge   Followup beta 2 microglobulin and anticardiolipin AB  FLOW cytometry pending       Cystitis:  On rocephin, EOT 10-20-22  UA negative  Renal US  negative  Blood cultures negative         FLU A:  RVP- flu a positive  Droplet plus isolation  CXR negative  Tamiflu started 10-18-22 for 5 days           Hematuria:  Urology consulted- recommends monitoring and outpatient cystoscopy      Anemia:  Trend HGB      Anticipated discharge needs:      Pending     Diet:  ADULT DIET; Regular  DVT PPx: IV heparin   Code status: Full Code    Hospital Problems:  Active Problems:    Acute deep vein thrombosis (DVT) of popliteal vein of left lower extremity (HCC)    Cystitis    Influenza A    Hematuria  Resolved Problems:    * No resolved hospital problems.  *      Objective:   Patient Vitals for the past 24 hrs:   Temp Pulse Resp BP SpO2   10/19/22 0900 -- 73 -- 106/68 98 %   10/19/22 0800 -- 66 -- 137/75 100 %   10/19/22 0700 98.4 °F (36.9 °C) 51 16 102/68 90 %   10/19/22 0645 -- 53 19 96/61 93 %   10/19/22 0630 -- 54 19 109/67 96 %   10/19/22 0615 -- (!) 47 17 107/68 95 %   10/19/22 0600 -- 50 19 113/62 96 %   10/19/22 0545 -- 79 22 135/66 99 %   10/19/22 0530 -- (!) 47 13 128/84 100 %   10/19/22 0515 -- 50 14 136/67 100 %   10/19/22 0500 -- 56 -- (!) 149/72 99 %   10/19/22 0445 -- 58 17 132/69 100 %   10/19/22 0430 -- 58 11 138/67 99 %   10/19/22 0415 -- 75 17 114/61 92 %   10/19/22 0400 -- 55 15 (!) 101/58 94 %   10/19/22 0345 -- (!) 49 -- 137/66 100 %   10/19/22 0330 -- 52 18 (!) 109/58 94 %   10/19/22 0315 98.3 °F (36.8 °C) 52 19 (!) 100/57 93 %   10/19/22 0300 -- 57 18 (!) 97/53 92 %   10/19/22 0245 -- 60 18 (!) 98/53 90 %   10/19/22 0230 -- 59 20 (!) 100/57 90 %   10/19/22 0215 -- 64 20 (!) 92/53 91 %   10/19/22 0200 -- 63 19 (!) 109/57 92 %   10/19/22 0145 -- 54 19 (!) 106/56 93 %   10/19/22 0130 -- 51 18 (!) 126/57 99 %   10/19/22 0115 -- 77 23 124/65 96 %   10/19/22 0100 -- 64 16 113/63 96 % 10/19/22 0045 -- 56 13 117/71 100 %   10/19/22 0030 -- 61 11 114/69 99 %   10/19/22 0015 -- 66 23 113/69 96 %   10/19/22 0000 -- 54 19 106/60 98 %   10/18/22 2345 -- 54 13 112/63 99 %   10/18/22 2330 -- 64 16 124/64 100 %   10/18/22 2315 97.9 °F (36.6 °C) (!) 47 18 (!) 150/66 100 %   10/18/22 2300 -- 60 17 (!) 140/77 100 %   10/18/22 2245 -- 63 22 (!) 103/59 95 %   10/18/22 2230 -- 64 20 (!) 110/59 95 %   10/18/22 2215 -- 61 22 (!) 103/58 94 %   10/18/22 2200 -- 85 -- 119/69 95 %   10/18/22 2145 -- 64 19 (!) 97/55 95 %   10/18/22 2130 -- 67 21 (!) 108/59 95 %   10/18/22 2115 -- 70 20 (!) 101/57 95 %   10/18/22 2100 -- 70 19 (!) 107/57 93 %   10/18/22 2045 -- 75 17 (!) 94/55 92 %   10/18/22 2030 -- 79 22 (!) 108/59 (!) 88 %   10/18/22 2015 -- 71 21 (!) 101/58 (!) 88 %   10/18/22 2000 -- 65 21 118/68 92 %   10/18/22 1945 -- 82 13 111/63 91 %   10/18/22 1930 -- 66 15 (!) 108/58 93 %   10/18/22 1915 -- 65 18 -- (!) 88 %   10/18/22 1902 99.4 °F (37.4 °C) 60 11 (!) 145/63 94 %   10/18/22 1900 -- 59 14 -- 94 %   10/18/22 1845 -- 67 12 128/63 95 %   10/18/22 1830 -- 65 15 132/72 96 %   10/18/22 1815 -- (!) 45 10 (!) 145/70 97 %   10/18/22 1800 -- (!) 46 11 (!) 147/72 100 %   10/18/22 1745 -- (!) 46 10 -- 99 %   10/18/22 1730 (!) 101.4 °F (38.6 °C) 50 23 (!) 157/76 100 %   10/18/22 1711 -- (!) 47 15 (!) 172/76 100 %   10/18/22 1657 98.6 °F (37 °C) 51 14 (!) 153/73 100 %   10/18/22 1148 99.5 °F (37.5 °C) 58 11 113/68 --   10/18/22 1100 (!) 101.3 °F (38.5 °C) 62 11 117/71 93 %   10/18/22 1030 -- 67 (!) 8 113/67 92 %   10/18/22 1000 -- 57 14 130/67 96 %   10/18/22 0936 (!) 102.4 °F (39.1 °C) -- -- -- --   10/18/22 0930 -- 56 22 134/78 96 %       Oxygen Therapy  SpO2: 98 %  Pulse Oximetry Type: Continuous  Pulse via Oximetry: 74 beats per minute  Pulse Oximeter Device Mode: Continuous  Pulse Oximeter Device Location: Left, Finger  O2 Device: Nasal cannula  Oximetry Probe Site Changed: Yes  Skin Assessment: Clean, dry, & intact  Skin Protection for O2 Device: N/A  O2 Flow Rate (L/min): 2 L/min  End Tidal CO2: 44 (%)  Oxygen Therapy: None (Room air)    Estimated body mass index is 24.74 kg/m² as calculated from the following:    Height as of this encounter: 6' 4\" (1.93 m). Weight as of this encounter: 203 lb 4.2 oz (92.2 kg). Intake/Output Summary (Last 24 hours) at 10/19/2022 0915  Last data filed at 10/19/2022 0700  Gross per 24 hour   Intake 1171.75 ml   Output 6650 ml   Net -5478.25 ml         Physical Exam:     Blood pressure 106/68, pulse 73, temperature 98.4 °F (36.9 °C), temperature source Oral, resp. rate 16, height 6' 4\" (1.93 m), weight 203 lb 4.2 oz (92.2 kg), SpO2 98 %. General:    Well nourished. Alert, no distress   CV:   RRR. No m/r/g. No jugular venous distension. trace+ LLE edema  Lungs:   CTAB. No wheezing, rhonchi, or rales. Symmetric expansion. Anterior   Abdomen: Bowel sounds present. Soft, nontender, nondistended. Extremities: No cyanosis or clubbing. Skin:     No rashes and normal coloration. Warm and dry. Neuro:  grossly intact. Psych:  Normal mood and affect.       I have personally reviewed labs and tests showing:  Recent Labs:  Recent Results (from the past 48 hour(s))   APTT    Collection Time: 10/17/22 10:22 PM   Result Value Ref Range    PTT 52.6 (H) 24.1 - 35.1 SEC   Fibrinogen    Collection Time: 10/17/22 10:22 PM   Result Value Ref Range    Fibrinogen 655 (H) 190 - 501 mg/dL   CBC    Collection Time: 10/17/22 10:22 PM   Result Value Ref Range    WBC 5.6 4.3 - 11.1 K/uL    RBC 3.63 M/uL    Hemoglobin 12.3 (L) 13.6 - 17.2 g/dL    Hematocrit 36.1 (L) 41.1 - 50.3 %    MCV 99.4 82.0 - 102.0 FL    MCH 33.9 (H) 26.1 - 32.9 PG    MCHC 34.1 31.4 - 35.0 g/dL    RDW 12.6 %    Platelets 824 K/uL    MPV 9.6 9.4 - 12.3 FL    nRBC 0.00 K/uL   APTT    Collection Time: 10/18/22  3:55 AM   Result Value Ref Range    PTT 61.1 (H) 24.1 - 35.1 SEC   CBC    Collection Time: 10/18/22  3:55 AM   Result Value Ref Range    WBC 5.6 4.3 - 11.1 K/uL    RBC 3.79 M/uL    Hemoglobin 12.7 (L) 13.6 - 17.2 g/dL    Hematocrit 38.2 (L) 41.1 - 50.3 %    .8 82.0 - 102.0 FL    MCH 33.5 (H) 26.1 - 32.9 PG    MCHC 33.2 31.4 - 35.0 g/dL    RDW 12.9 %    Platelets 875 K/uL    MPV 10.1 9.4 - 12.3 FL   Fibrinogen    Collection Time: 10/18/22  3:55 AM   Result Value Ref Range    Fibrinogen 314 190 - 501 mg/dL   Protime-INR    Collection Time: 10/18/22  3:55 AM   Result Value Ref Range    Protime 17.4 (H) 12.6 - 14.5 sec    INR 1.4     APTT    Collection Time: 10/18/22  9:33 AM   Result Value Ref Range    PTT 64.1 (H) 24.1 - 35.1 SEC   CBC    Collection Time: 10/18/22  9:33 AM   Result Value Ref Range    WBC 5.3 4.3 - 11.1 K/uL    RBC 3.57 (L) 4.23 - 5.6 M/uL    Hemoglobin 12.2 (L) 13.6 - 17.2 g/dL    Hematocrit 36.3 (L) 41.1 - 50.3 %    .7 82 - 102 FL    MCH 34.2 (H) 26.1 - 32.9 PG    MCHC 33.6 31.4 - 35.0 g/dL    RDW 12.9 11.9 - 14.6 %    Platelets 494 660 - 094 K/uL    MPV 9.9 9.4 - 12.3 FL    nRBC 0.00 0.0 - 0.2 K/uL   Fibrinogen    Collection Time: 10/18/22  9:33 AM   Result Value Ref Range    Fibrinogen 158 (L) 190 - 501 mg/dL   Culture, Blood 1    Collection Time: 10/18/22 10:24 AM    Specimen: Blood   Result Value Ref Range    Special Requests RIGHT  Antecubital        Culture NO GROWTH AFTER 20 HOURS     Culture, Blood 1    Collection Time: 10/18/22 10:24 AM    Specimen: Blood   Result Value Ref Range    Special Requests LEFT  HAND        Culture NO GROWTH AFTER 20 HOURS     Urinalysis w rflx microscopic    Collection Time: 10/18/22 11:22 AM   Result Value Ref Range    Color, UA YELLOW/STRAW      Appearance CLEAR      Specific Gravity, UA 1.015 1.001 - 1.023      pH, Urine 7.0 5.0 - 9.0      Protein,  (A) NEG mg/dL    Glucose, UA Negative mg/dL    Ketones, Urine TRACE (A) NEG mg/dL    Bilirubin Urine Negative NEG      Blood, Urine Negative NEG      Urobilinogen, Urine 1.0 0.2 - 1.0 EU/dL    Nitrite, Urine Negative NEG      Leukocyte Esterase, Urine Negative NEG      RBC, UA 0-3 0 /hpf    BACTERIA, URINE 0 0 /hpf   Respiratory Panel, Molecular, with COVID-19 (Restricted: peds pts or suitable admitted adults)    Collection Time: 10/18/22 11:22 AM    Specimen: Nasopharyngeal   Result Value Ref Range    Adenovirus by PCR NOT DETECTED NOTDET      Coronavirus 229E by PCR NOT DETECTED NOTDET      Coronavirus HKU1 by PCR NOT DETECTED NOTDET      Coronavirus NL63 by PCR NOT DETECTED NOTDET      Coronavirus OC43 by PCR NOT DETECTED NOTDET      SARS-CoV-2, PCR NOT DETECTED NOTDET      Human Metapneumovirus by PCR NOT DETECTED NOTDET      Rhinovirus Enterovirus PCR NOT DETECTED NOTDET      Influenza A H3 by PCR Detected (A) NOTDET      Influenza B PCR NOT DETECTED NOTDET      Parainfluenza 1 PCR NOT DETECTED NOTDET      Parainfluenza 2 PCR NOT DETECTED NOTDET      Parainfluenza 3 PCR NOT DETECTED NOTDET      Parainfluenza 4 PCR NOT DETECTED NOTDET      Respiratory Syncytial Virus by PCR NOT DETECTED NOTDET      Bordetella parapertussis by PCR NOT DETECTED NOTDET      Bordetella pertussis by PCR NOT DETECTED NOTDET      Chlamydophila Pneumonia PCR NOT DETECTED NOTDET      Mycoplasma pneumo by PCR NOT DETECTED NOTDET     POCT activated clotting time    Collection Time: 10/18/22  2:46 PM   Result Value Ref Range    Activated Clotting Time 109 70 - 128 SECS   POCT activated clotting time    Collection Time: 10/18/22  3:23 PM   Result Value Ref Range    Activated Clotting Time 242 (H) 70 - 128 SECS   POCT activated clotting time    Collection Time: 10/18/22  3:59 PM   Result Value Ref Range    Activated Clotting Time 231 (H) 70 - 128 SECS   CBC with Auto Differential    Collection Time: 10/18/22  6:04 PM   Result Value Ref Range    WBC 7.7 4.3 - 11.1 K/uL    RBC 3.79 (L) 4.23 - 5.6 M/uL    Hemoglobin 13.3 (L) 13.6 - 17.2 g/dL    Hematocrit 39.0 (L) 41.1 - 50.3 %    .9 (H) 82 - 102 FL    MCH 35.1 (H) 26.1 - 32.9 PG    MCHC 34.1 31.4 - 35.0 g/dL    RDW 12.9 11.9 - 14.6 %    Platelets 107 455 - 106 K/uL    MPV 9.8 9.4 - 12.3 FL    nRBC 0.00 0.0 - 0.2 K/uL    Differential Type AUTOMATED      Seg Neutrophils 75 43 - 78 %    Lymphocytes 13 13 - 44 %    Monocytes 9 4.0 - 12.0 %    Eosinophils % 1 0.5 - 7.8 %    Basophils 1 0.0 - 2.0 %    Immature Granulocytes 1 0.0 - 5.0 %    Segs Absolute 5.8 1.7 - 8.2 K/UL    Absolute Lymph # 1.0 0.5 - 4.6 K/UL    Absolute Mono # 0.7 0.1 - 1.3 K/UL    Absolute Eos # 0.1 0.0 - 0.8 K/UL    Basophils Absolute 0.1 0.0 - 0.2 K/UL    Absolute Immature Granulocyte 0.1 0.0 - 0.5 K/UL   Anti-Xa, Unfractionated Heparin    Collection Time: 10/18/22 11:07 PM   Result Value Ref Range    Anti-XA Unfrac Heparin 0.48 0.3 - 0.7 IU/mL   CBC    Collection Time: 10/19/22  4:53 AM   Result Value Ref Range    WBC 6.0 4.3 - 11.1 K/uL    RBC 4.05 (L) 4.23 - 5.6 M/uL    Hemoglobin 13.5 (L) 13.6 - 17.2 g/dL    Hematocrit 41.3 41.1 - 50.3 %    .0 82 - 102 FL    MCH 33.3 (H) 26.1 - 32.9 PG    MCHC 32.7 31.4 - 35.0 g/dL    RDW 12.6 11.9 - 14.6 %    Platelets 509 217 - 821 K/uL    MPV 10.2 9.4 - 12.3 FL    nRBC 0.00 0.0 - 0.2 K/uL   Basic Metabolic Panel    Collection Time: 10/19/22  4:53 AM   Result Value Ref Range    Sodium 140 133 - 143 mmol/L    Potassium 4.1 3.5 - 5.1 mmol/L    Chloride 106 101 - 110 mmol/L    CO2 26 21 - 32 mmol/L    Anion Gap 8 2 - 11 mmol/L    Glucose 105 (H) 65 - 100 mg/dL    BUN 7 6 - 23 MG/DL    Creatinine 0.70 (L) 0.8 - 1.5 MG/DL    Est, Glom Filt Rate >60 >60 ml/min/1.73m2    Calcium 8.8 8.3 - 10.4 MG/DL   Anti-Xa, Unfractionated Heparin    Collection Time: 10/19/22  4:53 AM   Result Value Ref Range    Anti-XA Unfrac Heparin 0.33 0.3 - 0.7 IU/mL       I have personally reviewed imaging studies showing: Other Studies:  US RETROPERITONEAL COMPLETE   Final Result   1. No hydronephrosis of the kidneys to indicate acute obstruction. XR CHEST PORTABLE   Final Result   The lungs are clear.   The heart is normal in size. No pneumothorax. No pleural effusions. Proximal left humeral fixation hardware is partially   imaged. Posttraumatic changes distal left clavicle. Old healed posterior lateral   left fourth rib fracture deformity also noted. IR GUIDED THROMB [facility] VEIN   Final Result   1. Extensive DGI of the left lower extremity, pelvis and IVC to the level of the   IVC filter. 2. After placement of an ultrasound assisted thrombolysis infusion catheter,   pharmacologic thrombolysis will be initiated. .      Plan: ICU observation. The patient will return the following day for repeat   evaluation and probable further intervention to any residual thrombus and/or   stenoses.       IR GUIDED THROMB [facility] NONCOR ART SBSQ VESSEL    (Results Pending)       Current Meds:  Current Facility-Administered Medications   Medication Dose Route Frequency    phenol 1.4 % mouth spray 1 spray  1 spray Mouth/Throat Q2H PRN    LORazepam (ATIVAN) tablet 1 mg  1 mg Oral BID PRN    heparin (porcine) injection 7,420 Units  80 Units/kg IntraVENous PRN    heparin (porcine) injection 3,710 Units  40 Units/kg IntraVENous PRN    heparin 25,000 units in dextrose 5% 250 mL (premix) infusion  5-30 Units/kg/hr IntraVENous Continuous    oseltamivir (TAMIFLU) capsule 75 mg  75 mg Oral BID    sodium chloride flush 0.9 % injection 5-40 mL  5-40 mL IntraVENous 2 times per day    sodium chloride flush 0.9 % injection 5-40 mL  5-40 mL IntraVENous PRN    0.9 % sodium chloride infusion   IntraVENous PRN    ondansetron (ZOFRAN-ODT) disintegrating tablet 4 mg  4 mg Oral Q8H PRN    Or    ondansetron (ZOFRAN) injection 4 mg  4 mg IntraVENous Q6H PRN    polyethylene glycol (GLYCOLAX) packet 17 g  17 g Oral Daily PRN    acetaminophen (TYLENOL) tablet 650 mg  650 mg Oral Q6H PRN    Or    acetaminophen (TYLENOL) suppository 650 mg  650 mg Rectal Q6H PRN    magnesium sulfate 2000 mg in 50 mL IVPB premix  2,000 mg IntraVENous PRN    potassium chloride 10 mEq/100 mL IVPB (Peripheral Line)  10 mEq IntraVENous PRN    potassium chloride (KLOR-CON M) extended release tablet 40 mEq  40 mEq Oral PRN    Or    potassium bicarb-citric acid (EFFER-K) effervescent tablet 40 mEq  40 mEq Oral PRN    Or    potassium chloride 10 mEq/100 mL IVPB (Peripheral Line)  10 mEq IntraVENous PRN    cefTRIAXone (ROCEPHIN) 1,000 mg in sodium chloride 0.9 % 50 mL IVPB mini-bag  1,000 mg IntraVENous Q24H    gabapentin (NEURONTIN) capsule 600 mg  600 mg Oral TID    morphine injection 2 mg  2 mg IntraVENous Q4H PRN       Signed:  Maria M Schulz MD    Part of this note may have been written by using a voice dictation software. The note has been proof read but may still contain some grammatical/other typographical errors.

## 2022-10-19 NOTE — PROGRESS NOTES
ACUTE OCCUPATIONAL THERAPY GOALS:   (Developed with and agreed upon by patient and/or caregiver.)  1. Patient will complete lower body bathing and dressing with set-up/supervision and adaptive equipment as needed. 2. Patient will complete toileting with supervision. 3. Patient will tolerate 30 minutes of OT treatment with 1-2 rest breaks to increase activity tolerance for ADLs. 4. Patient will complete functional transfers with supervision and adaptive equipment as needed. 5. Patient will complete functional mobility for household distances with supervision and adaptive equipment as needed. 6. Patient will complete self-grooming while standing edge of sink with supervision and adaptive equipment as needed. Timeframe: 7 visits       OCCUPATIONAL THERAPY Initial Assessment, Daily Note, and AM       OT Visit Days: 1   Acknowledge Orders  Time  OT Charge Capture  Rehab Caseload Tracker      Magaly Blue is a 56 y.o. male   PRIMARY DIAGNOSIS: <principal problem not specified>  Acute deep vein thrombosis (DVT) of right lower extremity, unspecified vein (HCC) [I82.401]    2 Days Post-Op  Reason for Referral: Generalized Muscle Weakness (M62.81)  Difficulty in walking, Not elsewhere classified (R26.2)  Inpatient: Payor: Nat Lima / Plan: Portillo Chappell / Product Type: *No Product type* /     ASSESSMENT:     REHAB RECOMMENDATIONS:   Recommendation to date pending progress:  Setting:  Short-term Rehab    Equipment:    To Be Determined     ASSESSMENT:  Mr. Rosie Kamara presents with deficits in overall strength, activity tolerance, ADL performance, and functional mobility. Presents in ICU for DVT LLE; underwent thrombectomy, 1 day post op. BUE (4/5) are generally decreased but WFL. Min A for functional bed mobility/supine <> sit transfer to EOB; fair+ EOB unsupported sitting balance. C/o of mild lightheadedness in upright sitting however BP WFL. Mod A to sharron shoe on LLE.  Min A x 2 for sit <> stand. Fair + static standing balance with use of RW; attempted for SPT however pt refusing to take any steps and not wanting to try anything further during session. Returned to supine in bed with needs met. At this time, Nazia Carmona is functioning below baseline for ADL performance and functional mobility. Patient would benefit from skilled OT services to address goals and plan of care. [address] [facility] [address] [id number] AM-PAC 6 Clicks Daily Activity Inpatient Short Form:    AM-PAC Daily Activity Inpatient   How much help for putting on and taking off regular lower body clothing?: A Lot  How much help for Bathing?: A Lot  How much help for Toileting?: A Lot  How much help for putting on and taking off regular upper body clothing?: A Little  How much help for taking care of personal grooming?: A Little  How much help for eating meals?: A Little  AM-PAC Inpatient Daily Activity Raw Score: 15  AM-PAC Inpatient ADL T-Scale Score : 34.69  ADL Inpatient CMS 0-100% Score: 56.46  ADL Inpatient CMS G-Code Modifier : CK           SUBJECTIVE:     Mr. Scout Garcia states, \"I'm too weak. I don't know why we are doing this now. \"     Social/Functional Lives With: Alone  ADL Assistance: Independent  Homemaking Assistance: Independent  Homemaking Responsibilities: Yes  Ambulation Assistance: Independent  Active :  (unknown)  Lives alone; independent at baseline for ADLs; uses rollator for fx mobility.    OBJECTIVE:     Rosario Crenshaw / Rene Butter / AIRWAY: Sandoval Catheter and IV    RESTRICTIONS/PRECAUTIONS:  Restrictions/Precautions: Fall Risk    PAIN: VITALS / O2:   Pre Treatment:       Numeric 0-10  8/10    Post Treatment:   8/10 (RN notified)       Vitals        WFL    Oxygen     Stable on RA       GROSS EVALUATION: INTACT IMPAIRED   (See Comments)   UE AROM [x] []   UE PROM [x] []   Strength []  Generally weak BUE WFL     Posture / Balance []  Intact sitting  Fair standing balance w/ use of RW   Sensation [x]     Coordination [x]       Tone [x] Edema [] B/l edema   Activity Tolerance []  Generally decreased     Hand Dominance R [] L []      COGNITION/  PERCEPTION: INTACT IMPAIRED   (See Comments)   Orientation [x]     Vision [x]     Hearing [x]     Cognition  [x]     Perception [x]       MOBILITY: I Mod I S SBA CGA Min Mod Max Total  NT x2 Comments:   Bed Mobility    Rolling [] [] [] [] [] [] [] [] [] [] []    Supine to Sit [] [] [] [] [] [x] [] [] [] [] []    Scooting [] [] [] [] [] [x] [] [] [] [] []    Sit to Supine [] [] [] [] [] [x] [] [] [] [] [x]    Transfers    Sit to Stand [] [] [] [] [] [x] [x] [] [] [] [x]    Bed to Chair [] [] [] [] [] [] [] [] [] [x] []    Stand to Sit [] [] [] [] [] [x] [] [] [] [] [x]    Tub/Shower [] [] [] [] [] [] [] [] [] [] []     Toilet [] [] [] [] [] [] [] [] [] [] []      [] [] [] [] [] [] [] [] [] [] []    I=Independent, Mod I=Modified Independent, S=Supervision/Setup, SBA=Standby Assistance, CGA=Contact Guard Assistance, Min=Minimal Assistance, Mod=Moderate Assistance, Max=Maximal Assistance, Total=Total Assistance, NT=Not Tested    ACTIVITIES OF DAILY LIVING: I Mod I S SBA CGA Min Mod Max Total NT Comments   BASIC ADLs:              Upper Body Bathing  [] [] [] [] [] [] [] [] [] [x]    Lower Body Bathing [] [] [] [] [] [] [] [] [] [x]    Toileting [] [] [] [] [] [] [] [] [] [x]    Upper Body Dressing [] [] [] [] [] [] [] [] [] [x]    Lower Body Dressing [] [] [] [] [] [] [] [x] [] [] Donning LLE shoe   Feeding [] [] [] [] [] [] [] [] [] [x]    Grooming [] [] [] [] [] [] [] [] [] [x]    Personal Device Care [] [] [] [] [] [] [] [] [] [x]    Functional Mobility [] [] [] [] [] [x] [] [] [] [] X 2 for sit <> stand    I=Independent, Mod I=Modified Independent, S=Supervision/Setup, SBA=Standby Assistance, CGA=Contact Guard Assistance, Min=Minimal Assistance, Mod=Moderate Assistance, Max=Maximal Assistance, Total=Total Assistance, NT=Not Tested    PLAN:     FREQUENCY/DURATION   OT Plan of Care: 3 times/week for duration of hospital stay or until stated goals are met, whichever comes first.    PROBLEM LIST:   (Skilled intervention is medically necessary to address:)  Decreased ADL/Functional Activities  Decreased Activity Tolerance  Decreased AROM/PROM  Decreased Balance  Decreased Gait Ability  Decreased Safety Awareness  Decreased Strength  Decreased Transfer Abilities   INTERVENTIONS PLANNED:  (Benefits and precautions of occupational therapy have been discussed with the patient.)  Self Care Training  Therapeutic Activity  Therapeutic Exercise/HEP  Neuromuscular Re-education  Manual Therapy  Education         TREATMENT:     EVALUATION: LOW COMPLEXITY: (Untimed Charge)    TREATMENT:   Neuromuscular Re-education (10 Minutes): Neuromuscular Re-education included Balance Training, Coordination training, Postural training, Sitting balance training, and Standing balance training to improve Balance, Coordination, and Postural Control.     TREATMENT GRID:  N/A    AFTER TREATMENT PRECAUTIONS: Bed, Call light within reach, Needs within reach, and RN notified    INTERDISCIPLINARY COLLABORATION:  RN/ PCT, PT/ PTA, and OT/ MENJIVAR    EDUCATION:  Education Given To: Patient  Education Provided: Role of Therapy;Plan of Care  Barriers to Learning: None  Education Outcome: Verbalized understanding;Demonstrated understanding      TOTAL TREATMENT DURATION AND TIME:  Time In: 1110  Time Out: [address]  Minutes: [address], OT

## 2022-10-20 LAB
ANION GAP SERPL CALC-SCNC: 5 MMOL/L (ref 2–11)
BUN SERPL-MCNC: 6 MG/DL (ref 6–23)
CALCIUM SERPL-MCNC: 9 MG/DL (ref 8.3–10.4)
CARDIOLIPIN IGA SER IA-ACNC: <9 APL U/ML (ref 0–11)
CARDIOLIPIN IGG SER IA-ACNC: <9 GPL U/ML (ref 0–14)
CARDIOLIPIN IGM SER IA-ACNC: 16 MPL U/ML (ref 0–12)
CHLORIDE SERPL-SCNC: 106 MMOL/L (ref 101–110)
CO2 SERPL-SCNC: 27 MMOL/L (ref 21–32)
CREAT SERPL-MCNC: 0.7 MG/DL (ref 0.8–1.5)
ERYTHROCYTE [DISTWIDTH] IN BLOOD BY AUTOMATED COUNT: 12.6 % (ref 11.9–14.6)
GLUCOSE SERPL-MCNC: 94 MG/DL (ref 65–100)
HCT VFR BLD AUTO: 37.8 % (ref 41.1–50.3)
HGB BLD-MCNC: 12.3 G/DL (ref 13.6–17.2)
MCH RBC QN AUTO: 33.6 PG (ref 26.1–32.9)
MCHC RBC AUTO-ENTMCNC: 32.5 G/DL (ref 31.4–35)
MCV RBC AUTO: 103.3 FL (ref 82–102)
MM INDURATION, POC: 0 MM (ref 0–5)
NRBC # BLD: 0 K/UL (ref 0–0.2)
PLATELET # BLD AUTO: 350 K/UL (ref 150–450)
PMV BLD AUTO: 9.9 FL (ref 9.4–12.3)
POTASSIUM SERPL-SCNC: 3.9 MMOL/L (ref 3.5–5.1)
PPD, POC: NEGATIVE
RBC # BLD AUTO: 3.66 M/UL (ref 4.23–5.6)
SODIUM SERPL-SCNC: 138 MMOL/L (ref 133–143)
UFH PPP CHRO-ACNC: 0.34 IU/ML (ref 0.3–0.7)
WBC # BLD AUTO: 5.4 K/UL (ref 4.3–11.1)

## 2022-10-20 PROCEDURE — 6370000000 HC RX 637 (ALT 250 FOR IP): Performed by: INTERNAL MEDICINE

## 2022-10-20 PROCEDURE — 97116 GAIT TRAINING THERAPY: CPT

## 2022-10-20 PROCEDURE — 1100000000 HC RM PRIVATE

## 2022-10-20 PROCEDURE — 97530 THERAPEUTIC ACTIVITIES: CPT

## 2022-10-20 PROCEDURE — 99231 SBSQ HOSP IP/OBS SF/LOW 25: CPT | Performed by: INTERNAL MEDICINE

## 2022-10-20 PROCEDURE — 2580000003 HC RX 258: Performed by: STUDENT IN AN ORGANIZED HEALTH CARE EDUCATION/TRAINING PROGRAM

## 2022-10-20 PROCEDURE — APPSS30 APP SPLIT SHARED TIME 16-30 MINUTES: Performed by: NURSE PRACTITIONER

## 2022-10-20 PROCEDURE — 6360000002 HC RX W HCPCS: Performed by: STUDENT IN AN ORGANIZED HEALTH CARE EDUCATION/TRAINING PROGRAM

## 2022-10-20 PROCEDURE — 6360000002 HC RX W HCPCS: Performed by: INTERNAL MEDICINE

## 2022-10-20 PROCEDURE — 6370000000 HC RX 637 (ALT 250 FOR IP): Performed by: FAMILY MEDICINE

## 2022-10-20 PROCEDURE — 85027 COMPLETE CBC AUTOMATED: CPT

## 2022-10-20 PROCEDURE — 85520 HEPARIN ASSAY: CPT

## 2022-10-20 PROCEDURE — 6370000000 HC RX 637 (ALT 250 FOR IP): Performed by: STUDENT IN AN ORGANIZED HEALTH CARE EDUCATION/TRAINING PROGRAM

## 2022-10-20 PROCEDURE — 36415 COLL VENOUS BLD VENIPUNCTURE: CPT

## 2022-10-20 PROCEDURE — 97112 NEUROMUSCULAR REEDUCATION: CPT

## 2022-10-20 PROCEDURE — 97535 SELF CARE MNGMENT TRAINING: CPT

## 2022-10-20 PROCEDURE — 80048 BASIC METABOLIC PNL TOTAL CA: CPT

## 2022-10-20 RX ORDER — ASPIRIN 81 MG/1
81 TABLET ORAL DAILY
Status: DISCONTINUED | OUTPATIENT
Start: 2022-10-20 | End: 2022-10-22 | Stop reason: HOSPADM

## 2022-10-20 RX ORDER — CLOPIDOGREL BISULFATE 75 MG/1
75 TABLET ORAL DAILY
Status: DISCONTINUED | OUTPATIENT
Start: 2022-10-21 | End: 2022-10-22 | Stop reason: HOSPADM

## 2022-10-20 RX ADMIN — OSELTAMIVIR PHOSPHATE 75 MG: 75 CAPSULE ORAL at 21:30

## 2022-10-20 RX ADMIN — GABAPENTIN 600 MG: 300 CAPSULE ORAL at 08:26

## 2022-10-20 RX ADMIN — GABAPENTIN 600 MG: 300 CAPSULE ORAL at 21:30

## 2022-10-20 RX ADMIN — LORAZEPAM 1 MG: 1 TABLET ORAL at 08:51

## 2022-10-20 RX ADMIN — GABAPENTIN 600 MG: 300 CAPSULE ORAL at 13:37

## 2022-10-20 RX ADMIN — SODIUM CHLORIDE, PRESERVATIVE FREE 5 ML: 5 INJECTION INTRAVENOUS at 21:30

## 2022-10-20 RX ADMIN — SODIUM CHLORIDE, PRESERVATIVE FREE 10 ML: 5 INJECTION INTRAVENOUS at 08:27

## 2022-10-20 RX ADMIN — HEPARIN SODIUM 18 UNITS/KG/HR: 10000 INJECTION, SOLUTION INTRAVENOUS at 03:25

## 2022-10-20 RX ADMIN — APIXABAN 10 MG: 5 TABLET, FILM COATED ORAL at 21:30

## 2022-10-20 RX ADMIN — ASPIRIN 81 MG: 81 TABLET ORAL at 14:53

## 2022-10-20 RX ADMIN — CEFTRIAXONE 1000 MG: 1 INJECTION, POWDER, FOR SOLUTION INTRAMUSCULAR; INTRAVENOUS at 16:53

## 2022-10-20 RX ADMIN — OSELTAMIVIR PHOSPHATE 75 MG: 75 CAPSULE ORAL at 08:26

## 2022-10-20 NOTE — PROGRESS NOTES
Progress Note    Patient: Justino Mendieta MRN: [id number]  SSN: [id number]    YOB: 1965  Age: 56 y.o. Sex: male      Admit Date: 10/17/2022    LOS: 3 days     Assessment and Plan:   57 yo male with PMH of polycythemia vera, s/p remote IVC filter placed and retained, recent UTI on bactrim, admitted with LLE edema, UTI. 1.  Extensive left lower extremity DVT and thrombosed IVC filter  -Status post EKOS 10/17/2022  -Status post thrombectomy with stent 10/18/2022  -Stop heparin drip  -Start Eliquis  -Start aspirin  -IR also recommended Plavix. Will consult hematology about this  -Follow hypercoagulable studies  -Symptomatic management  -IR and hematology recommendations appreciated    2. Urinary tract infection  -Continue ceftriaxone  -Renal ultrasound negative  -Blood cultures negative    3. Influenza A infection  -Continue Tamiflu  -Chest x-ray without pneumonia  -Symptomatic management    4. Hematuria  -Monitor for now  -Urology recommendations appreciated, outpatient cystoscopy    5. Macrocytic anemia  -Obtain B12 levels  -Monitor H&H  Transfuse if hemoglobin lower than 7    DVT prophylaxis with Eliquis    Subjective:   57 yo male with PMH of polycythemia vera, s/p remote IVC filter placed and retained, recent UTI on bactrim, admitted with LLE edema, UTI. Patient seen and examined at bedside. This morning the patient feeling tired. Still having some left lower extremity pain. Denies any chest pain, no abdominal pain, no nausea or vomiting.     Objective:     Vitals:    10/20/22 0010 10/20/22 0316 10/20/22 0827 10/20/22 1217   BP: 120/80 109/63 123/84 125/82   Pulse: 77 59 65 70   Resp: 20 20 18    Temp: 98 °F (36.7 °C) 97.5 °F (36.4 °C) 98 °F (36.7 °C) 98.2 °F (36.8 °C)   TempSrc: Axillary Oral Oral Oral   SpO2: 98% 99% 96% 96%   Weight:       Height:            Intake and Output:  Current Shift: 10/20 0701 - 10/20 1900  In: 120 [P.O.:120]  Out: -   Last three shifts: 10/18 1901 - 10/20 0700  In: 2007.8 [P.O.:1080; I.V.:927.8]  Out: 55791 [Urine:87442]    ROS  10 ROS negative except from stated on subjective    Physical Exam:   General: Alert, oriented, NAD  HEENT: NC/AT, EOM are intact  Neck: supple, no JVD  Cardiovascular: RRR, S1, S2, no murmurs  Respiratory: Lungs are clear, no wheezes or rales  Abdomen: Soft, NT, ND  Back: No CVA tenderness, no paraspinal tenderness  Extremities: LE without pedal edema, no erythema  Neuro: A&O, CN are intact, no focal deficits  Skin: no rash or ulcers  Psych: good mood and affect    Lab/Data Review:  I have personally reviewed patients laboratory data showing  Recent Results (from the past 24 hour(s))   CBC    Collection Time: 10/20/22  4:03 AM   Result Value Ref Range    WBC 5.4 4.3 - 11.1 K/uL    RBC 3.66 (L) 4.23 - 5.6 M/uL    Hemoglobin 12.3 (L) 13.6 - 17.2 g/dL    Hematocrit 37.8 (L) 41.1 - 50.3 %    .3 (H) 82 - 102 FL    MCH 33.6 (H) 26.1 - 32.9 PG    MCHC 32.5 31.4 - 35.0 g/dL    RDW 12.6 11.9 - 14.6 %    Platelets 893 965 - 012 K/uL    MPV 9.9 9.4 - 12.3 FL    nRBC 0.00 0.0 - 0.2 K/uL   Basic Metabolic Panel    Collection Time: 10/20/22  4:03 AM   Result Value Ref Range    Sodium 138 133 - 143 mmol/L    Potassium 3.9 3.5 - 5.1 mmol/L    Chloride 106 101 - 110 mmol/L    CO2 27 21 - 32 mmol/L    Anion Gap 5 2 - 11 mmol/L    Glucose 94 65 - 100 mg/dL    BUN 6 6 - 23 MG/DL    Creatinine 0.70 (L) 0.8 - 1.5 MG/DL    Est, Glom Filt Rate >60 >60 ml/min/1.73m2    Calcium 9.0 8.3 - 10.4 MG/DL   Anti-Xa, Unfractionated Heparin    Collection Time: 10/20/22  4:03 AM   Result Value Ref Range    Anti-XA Unfrac Heparin 0.34 0.3 - 0.7 IU/mL      [unfilled]     Image:  I have personally reviewed patients imaging showing  IR GUIDED THROMB MECH NONCOR ART SBSQ VESSEL   Final Result   1. Residual DVT in the bilateral pelvic veins, right femoral vein and IVC. 2. Successful thrombectomy of the bilateral lower extremities in the IVC.    3. Residual stenosis of the left common iliac vein, greater than 70%, compatible   with May Thurner syndrome. 4. Successful stenting of the stenosis with marked angiographic improvement. Plan: Continue oral anticoagulation for 6 months. Plavix for 6 months. Aspirin   for life. US RETROPERITONEAL COMPLETE   Final Result   1. No hydronephrosis of the kidneys to indicate acute obstruction. XR CHEST PORTABLE   Final Result   The lungs are clear. The heart is normal in size. No pneumothorax. No pleural effusions. Proximal left humeral fixation hardware is partially   imaged. Posttraumatic changes distal left clavicle. Old healed posterior lateral   left fourth rib fracture deformity also noted. IR GUIDED THROMB [facility] VEIN   Final Result   1. Extensive DGI of the left lower extremity, pelvis and IVC to the level of the   IVC filter. 2. After placement of an ultrasound assisted thrombolysis infusion catheter,   pharmacologic thrombolysis will be initiated. .      Plan: ICU observation. The patient will return the following day for repeat   evaluation and probable further intervention to any residual thrombus and/or   stenoses.            Current Facility-Administered Medications   Medication Dose Route Frequency Provider Last Rate Last Admin    phenol 1.4 % mouth spray 1 spray  1 spray Mouth/Throat Q2H PRN Frank Joseph MD   1 spray at 10/19/22 0441    tuberculin injection 5 Units  5 Units IntraDERmal Once Drake Abraham MD   5 Units at 10/19/22 1625    LORazepam (ATIVAN) tablet 1 mg  1 mg Oral BID PRN Frank Joseph MD   1 mg at 10/20/22 0851    heparin (porcine) injection 7,420 Units  80 Units/kg IntraVENous PRN Drake Abraham MD        heparin (porcine) injection 3,710 Units  40 Units/kg IntraVENous PRN Drake Abraham MD        heparin 25,000 units in dextrose 5% 250 mL (premix) infusion  5-30 Units/kg/hr IntraVENous Continuous Drake Abraham MD 16.7 mL/hr at 10/20/22 0610 18 Units/kg/hr at 10/20/22 0610    oseltamivir (TAMIFLU) capsule 75 mg  75 mg Oral BID Mu Meeks MD   75 mg at 10/20/22 9698    sodium chloride flush 0.9 % injection 5-40 mL  5-40 mL IntraVENous 2 times per day Kiera Norman MD   10 mL at 10/20/22 0827    sodium chloride flush 0.9 % injection 5-40 mL  5-40 mL IntraVENous PRN Kiera Norman MD        0.9 % sodium chloride infusion   IntraVENous PRN Kiera Norman MD 25 mL/hr at 10/18/22 1324 New Bag at 10/18/22 1324    ondansetron (ZOFRAN-ODT) disintegrating tablet 4 mg  4 mg Oral Q8H PRN Kiera Norman MD        Or    ondansetron [facility]) injection 4 mg  4 mg IntraVENous Q6H PRN Kiera Norman MD        polyethylene glycol (GLYCOLAX) packet 17 g  17 g Oral Daily PRN Kiera Norman MD        acetaminophen (TYLENOL) tablet 650 mg  650 mg Oral Q6H PRN Kiera Norman MD   650 mg at 10/18/22 1740    Or    acetaminophen (TYLENOL) suppository 650 mg  650 mg Rectal Q6H PRN Kiera Norman MD        magnesium sulfate 2000 mg in 50 mL IVPB premix  2,000 mg IntraVENous PRN Kiera Norman MD        potassium chloride 10 mEq/100 mL IVPB (Peripheral Line)  10 mEq IntraVENous PRN Kiera Norman MD        potassium chloride (KLOR-CON M) extended release tablet 40 mEq  40 mEq Oral PRN Kiera Norman MD        Or    potassium bicarb-citric acid (EFFER-K) effervescent tablet 40 mEq  40 mEq Oral PRN Kiera Norman MD        Or    potassium chloride 10 mEq/100 mL IVPB (Peripheral Line)  10 mEq IntraVENous PRN Kiera Norman MD        cefTRIAXone (ROCEPHIN) 1,000 mg in sodium chloride 0.9 % 50 mL IVPB mini-bag  1,000 mg IntraVENous Q24H Kiera Norman MD   Stopped at 10/19/22 1719    gabapentin (NEURONTIN) capsule 600 mg  600 mg Oral TID Kiera Norman MD   600 mg at 10/20/22 1337    morphine injection 2 mg  2 mg IntraVENous Q4H PRN Kiera Norman MD   2 mg at 10/17/[address] problems     Patient Active Problem List   Diagnosis    Acute deep vein thrombosis (DVT) of popliteal vein of left lower extremity (HCC)    Acute deep vein thrombosis (DVT) of right lower extremity, unspecified vein (HCC)    Cystitis    Influenza A    Hematuria        I have reviewed, updated, and verified this note's content and spent 38 minutes of my 42 minutes visit performing counseling and coordination of care regarding medical management.        Signed By: Penny Payton MD     October 20, 2022

## 2022-10-20 NOTE — PROGRESS NOTES
Pt asleep in bed at this time. Respirations are even and unlabored on RA, no signs or symptoms of distress noted. Call light in reach, safety measures in place. Will continue to monitor.

## 2022-10-20 NOTE — PROGRESS NOTES
ACUTE OCCUPATIONAL THERAPY GOALS:   (Developed with and agreed upon by patient and/or caregiver.)  1. Patient will complete lower body bathing and dressing with set-up/supervision and adaptive equipment as needed. 2. Patient will complete toileting with supervision. 3. Patient will tolerate 30 minutes of OT treatment with 1-2 rest breaks to increase activity tolerance for ADLs. 4. Patient will complete functional transfers with supervision and adaptive equipment as needed. 5. Patient will complete functional mobility for household distances with supervision and adaptive equipment as needed. 6. Patient will complete self-grooming while standing edge of sink with supervision and adaptive equipment as needed. Timeframe: 7 visits     OCCUPATIONAL THERAPY: Daily Note    OT Visit Days: 2   Time  OT Charge Capture  Rehab Caseload Tracker  OT Orders    Mirella Mann is a 56 y.o. male   PRIMARY DIAGNOSIS: <principal problem not specified>  Acute deep vein thrombosis (DVT) of right lower extremity, unspecified vein (HCC) [I82.401]    3 Days Post-Op  Inpatient: Payor: Jos Morales / Plan: Roderick Be [facility] MEDICAID / Product Type: *No Product type* /     ASSESSMENT:     REHAB RECOMMENDATIONS: CURRENT LEVEL OF FUNCTION:  (Most Recently Demonstrated)   Recommendation to date pending progress:  Setting:  Short-term Rehab    Equipment:    To Be Determined Bathing:  Not Tested  Dressing:  Minimal Assist  Feeding/Grooming:  Not Tested  Toileting:  Minimal Assist  Functional Mobility:  Minimal Assist     ASSESSMENT:  Mr. Kennedy Heck is progressing well towards OT goals. Today, pt was received supine in bed. Completed bed mobility, LB dressing, functional transfers, ambulation with rollator, and toileting with overall Richie. Pt with a very wide base of support and shuffled steps for his gait--reported this is how he always walks though noted to be unsteady. Recommend rehab at discharge.  Mr. Kennedy Heck continues to demonstrate overall deficits in strength, balance, activity tolerance, and ADL performance. Continue OT efforts and POC in order to address functional deficits and OT goals stated above. SUBJECTIVE:     Mr. Prashanth Hoffman states, \"I always walk this way. \"     Social/Functional Lives With: Alone  ADL Assistance: Independent  Homemaking Assistance: Independent  Homemaking Responsibilities: Yes  Ambulation Assistance: Independent  Active :  (unknown)    OBJECTIVE:     Merced Verdugo / Kevin Shoemaker / AIRWAY: IV    RESTRICTIONS/PRECAUTIONS:  Restrictions/Precautions  Restrictions/Precautions: Fall Risk        PAIN: VITALS / O2:   Pre Treatment:            Post Treatment: no change  Vitals          Oxygen        MOBILITY: I Mod I S SBA CGA Min Mod Max Total  NT x2 Comments:   Bed Mobility    Rolling [] [] [] [] [] [] [] [] [] [x] []    Supine to Sit [] [] [] [] [x] [] [] [] [] [] []    Scooting [] [] [] [] [x] [] [] [] [] [] []    Sit to Supine [] [] [] [] [x] [] [] [] [] [] []    Transfers    Sit to Stand [] [] [] [] [] [x] [] [] [] [] []    Bed to Chair [] [] [] [] [] [] [] [] [] [x] []    Stand to Sit [] [] [] [] [] [x] [] [] [] [] []    Tub/Shower [] [] [] [] [] [] [] [] [] [x] []     Toilet [] [] [] [] [] [x] [] [] [] [] []      [] [] [] [] [] [] [] [] [] [] []    I=Independent, Mod I=Modified Independent, S=Supervision/Setup, SBA=Standby Assistance, CGA=Contact Guard Assistance, Min=Minimal Assistance, Mod=Moderate Assistance, Max=Maximal Assistance, Total=Total Assistance, NT=Not Tested    ACTIVITIES OF DAILY LIVING: I Mod I S SBA CGA Min Mod Max Total NT Comments   BASIC ADLs:              Upper Body   Bathing [] [] [] [] [] [] [] [] [] [x]    Lower Body Bathing [] [] [] [] [] [] [] [] [] [x]    Toileting [] [] [] [] [] [x] [] [] [] []    Upper Body Dressing [] [] [] [] [x] [] [] [] [] [] Gown change    Lower Body Dressing [] [] [] [] [] [x] [] [] [] [] shoes   Feeding [] [] [] [] [] [] [] [] [] [x]    Grooming [] [] [] [] [] [] [] [] [] [x]    Personal Device Care [] [] [] [] [] [] [] [] [] [x]    Functional Mobility [] [] [] [] [] [x] [] [] [] [] RW   I=Independent, Mod I=Modified Independent, S=Supervision/Setup, SBA=Standby Assistance, CGA=Contact Guard Assistance, Min=Minimal Assistance, Mod=Moderate Assistance, Max=Maximal Assistance, Total=Total Assistance, NT=Not Tested    BALANCE: Good Fair+ Fair Fair- Poor NT Comments   Sitting Static [] [x] [] [] [] []    Sitting Dynamic [] [x] [] [] [] []              Standing Static [] [x] [] [] [] []    Standing Dynamic [] [] [x] [] [] []        PLAN:     FREQUENCY/DURATION   OT Plan of Care: 3 times/week for duration of hospital stay or until stated goals are met, whichever comes first.    TREATMENT:     TREATMENT:   Co-Treatment PT/OT necessary due to patient's decreased overall endurance/tolerance levels, as well as need for high level skilled assistance to complete functional transfers/mobility and functional tasks  Neuromuscular Re-education (16 Minutes): Neuromuscular Re-education included Balance Training, Coordination training, Postural training, Sitting balance training, and Standing balance training to improve Balance, Coordination, Functional Mobility, and Postural Control. Self Care (10 minutes): Patient participated in toileting and lower body dressing ADLs in unsupported sitting with minimal verbal cueing to increase independence, decrease assistance required, increase activity tolerance, and increase safety awareness. Patient also participated in functional mobility and functional transfer training to increase independence, decrease assistance required, increase activity tolerance, and increase safety awareness.      TREATMENT GRID:  N/A    AFTER TREATMENT PRECAUTIONS: Bed, Call light within reach, Needs within reach, and RN notified    INTERDISCIPLINARY COLLABORATION:  RN/ PCT, PT/ PTA, and OT/ MENJIVAR    EDUCATION:       TOTAL TREATMENT DURATION AND TIME:  Time In: 7679  Time Out: [address] [address]  Minutes: 130 Isa Self

## 2022-10-20 NOTE — PROGRESS NOTES
ACUTE PHYSICAL THERAPY GOALS:   (Developed with and agreed upon by patient and/or caregiver. )  LTG:  (1.)Mr. Sanjay Mendez will move from supine to sit and sit to supine , scoot up and down, and roll side to side in bed with MODIFIED INDEPENDENCE within 7 treatment day(s). (2.)Mr. Sanjay Mendez will transfer from bed to chair and chair to bed with STAND BY ASSIST using the least restrictive device within 7 treatment day(s). (3.)Mr. Antoine will ambulate with CONTACT GUARD ASSIST for 100 feet with the least restrictive device within 7 treatment day(s). (4.)Mr. Sanjay Mendez will participate in therapeutic activity/exercises x 25 minutes for increased strength within 7 treatment days. (5.)Mr. Antoine will perform standing static and dynamic balance activities x 15 minutes with STAND BY ASSIST to improve safety within 7 day(s). PHYSICAL THERAPY: Daily Note PM   (Link to Caseload Tracking: PT Visit Days : 2  Time In/Out PT Charge Capture  Rehab Caseload Tracker  Orders    Chaz Albright is a 56 y.o. male   PRIMARY DIAGNOSIS: <principal problem not specified>  Acute deep vein thrombosis (DVT) of right lower extremity, unspecified vein (HCC) [I82.401]    3 Days Post-Op  Inpatient: Payor: Norma Landeros / Plan: Coleen Sow OF [place] MEDICAID / Product Type: *No Product type* /     ASSESSMENT:     REHAB RECOMMENDATIONS:   Recommendation to date pending progress:  Setting:  Short-term Rehab    Equipment:    To Be Determined     ASSESSMENT:  Mr. Sanjay Mendez is agreeable to therapy treatment today with some encouragement needed. He performs bed mobility and transfer to sitting with SBA. Worked on seated mobility and functional activities, sit-stand transfers to RW, gait training in room 30 ft x 2 trials with cues for walker management, body mechanics, and foot placement for safety. Pt with very wide base of support which he reports is baseline and decreased bilateral knee flexion during gait.  Walks with small, quick steps and some lateral trunk sway, increased trunk flexion. Verbal and visual cues for safety. Practiced functional transfers and additional gait training to/from bathroom. Pt declined to sit in recliner today, states he will move hospital bed into chair position. Mr. Rosie Kamara is functioning well below baseline at this time and would benefit from continued therapy during hospital stay to maximize safety/independence with mobility. Recommending STR at dc- pt in agreement.      SUBJECTIVE:   Mr. Rosie Kamara states, \"I've done everything you've asked\"     Social/Functional Lives With: Alone  ADL Assistance: [address]: Independent  Homemaking Responsibilities: Yes  Ambulation Assistance: Independent  Active :  (unknown)  OBJECTIVE:     PAIN: Jarod Michelle / O2: Virginia Robertson / Epifanio Villegas / Cecelia Burtonks:   Pre Treatment:          Post Treatment: 0/10 Vitals        Oxygen on RA    Sandoval Catheter and IV    RESTRICTIONS/PRECAUTIONS:        MOBILITY: I Mod I S SBA CGA Min Mod Max Total  NT x2 Comments:   Bed Mobility    Rolling [] [] [] [x] [] [] [] [] [] [] []    Supine to Sit [] [] [] [x] [] [] [] [] [] [] []    Scooting [] [] [] [x] [] [] [] [] [] [] []    Sit to Supine [] [] [] [x] [] [] [] [] [] [] []    Transfers    Sit to Stand [] [] [] [] [x] [] [] [] [] [] []    Bed to Chair [] [] [] [] [x] [x] [] [] [] [] []    Stand to Sit [] [] [] [] [x] [] [] [] [] [] []     [] [] [] [] [] [] [] [] [] [] []    I=Independent, Mod I=Modified Independent, S=Supervision, SBA=Standby Assistance, CGA=Contact Guard Assistance,   Min=Minimal Assistance, Mod=Moderate Assistance, Max=Maximal Assistance, Total=Total Assistance, NT=Not Tested    BALANCE: Good Fair+ Fair Fair- Poor NT Comments   Sitting Static [x] [] [] [] [] []    Sitting Dynamic [x] [] [] [] [] []              Standing Static [] [] [x] [] [] []    Standing Dynamic [] [] [x] [] [] []      GAIT: I Mod I S SBA CGA Min Mod Max Total  NT x2 Comments:   Level of Assistance [] [] [] [] [x] [x] [] [] [] [] [] Pt with very wide HARRY, has one foot constantly outside base of walker and reports this is his baseline. Does have wider rollator that he uses and states both feet fit inside rollator. Noted decreased knee flexion during gait with forward flexed posture and small, quick steps. Distance 30 ft x 2      DME Rolling Walker    Gait Quality Trunk sway increased and Wide base of support    Weightbearing Status      Stairs      I=Independent, Mod I=Modified Independent, S=Supervision, SBA=Standby Assistance, CGA=Contact Guard Assistance,   Min=Minimal Assistance, Mod=Moderate Assistance, Max=Maximal Assistance, Total=Total Assistance, NT=Not Tested    PLAN:   FREQUENCY AND DURATION: 3 times/week for duration of hospital stay or until stated goals are met, whichever comes first.    TREATMENT:   TREATMENT:   Co-Treatment PT/OT necessary due to patient's decreased overall endurance/tolerance levels, as well as need for high level skilled assistance to complete functional transfers/mobility and functional tasks  Therapeutic Activity (11 Minutes): Therapeutic activity included Rolling, Supine to Sit, Sit to Supine, Scooting, Transfer Training, Ambulation on level ground, Sitting balance , and Standing balance to improve functional Activity tolerance, Balance, Coordination, Mobility, and Strength. Gait Training (15 Minutes): Gait training for 30 x 2 feet utilizing 5 [facility]. Patient required Manual, Tactile, Verbal, and Visual cueing to improve Activity Pacing, Assistive Device Utilization, Dynamic Standing Balance, and Gait Mechanics.      TREATMENT GRID:  N/A    AFTER TREATMENT PRECAUTIONS: Bed, Bed/Chair Locked, Call light within reach, and Needs within reach    INTERDISCIPLINARY COLLABORATION:  RN/ PCT, PT/ PTA, and OT/ MENJIVAR    EDUCATION:      TIME IN/OUT:  Time In: 1410  Time Out: [address] [place]  Minutes: [address] Narendra Somers, FRED

## 2022-10-20 NOTE — PROGRESS NOTES
Patient is agreeable to short term rehab at d/c. Patient provided 3 choices: [facility]     Referrals sent -  will follow up.

## 2022-10-20 NOTE — FLOWSHEET NOTE
Patient on room air. Heparin drip in progress. Safety measures noted. Will continue to monitor per policy.      10/20/22 2395   Handoff   Communication Given Shift Handoff   Handoff Received From Quynh Miller, RN   Handoff Communication Face to Face   Time Handoff Given 5169

## 2022-10-20 NOTE — PROGRESS NOTES
Date of Outreach Update:  Tacho Ortega was seen and assessed. Vitals:    10/19/22 1518 10/19/22 1917 10/20/22 0010 10/20/22 0316   BP: 128/83 126/76 120/80 109/63   Pulse: 73 71 77 59   Resp: 18 20 20 20   Temp: 97.7 °F (36.5 °C) 99.5 °F (37.5 °C) 98 °F (36.7 °C) 97.5 °F (36.4 °C)   TempSrc:  Oral Axillary Oral   SpO2: 98% 99% 98% 99%   Weight:       Height:            Previous Outreach assessment has been reviewed. There have been no significant clinical changes since the completion of the last dated Outreach assessment. Patient is asleep, respirations even and unlabored. Spoke with primary RN, no concerns at this time. VS reviewed. Will continue to follow up per outreach protocol.     Signed By:   Klarissa Mejia RN    [phone]

## 2022-10-20 NOTE — PROGRESS NOTES
Pt resting comfortably in bed at this time. Respirations are even and unlabored on RA, no signs or symptoms of distress noted at this time. No complaints of pain at this time. Heparin ggt @ 18, handoff completed with Onesimo Hammans. Pt is alert and oriented x4. Call light in place, safety measures in place, will continue to monitor.

## 2022-10-20 NOTE — PROGRESS NOTES
Patient has been accepted at [facility] pending medicaid LOC. LOC submitted and supporting documents faxed 10.20.2022. CM following.

## 2022-10-20 NOTE — PROGRESS NOTES
[facility] Hematology & Oncology        Inpatient Hematology / Oncology Progress Note    Reason for Admission:  Acute deep vein thrombosis (DVT) of right lower extremity, unspecified vein (HCC) [I82.401]    24 Hour Events:  Afebrile, VSS  Beta 2 glycoprotein neg  Anticardiolipin Ab pending  On Heparin gtt  Continues on Tamiflu for Influenza A        ROS:  Constitutional: +malaise. Negative for fever, chills. CV: Negative for chest pain, palpitations, edema. Respiratory: Negative for dyspnea, cough, wheezing. GI: Negative for nausea, abdominal pain, diarrhea. : +hematuria    10 point review of systems is otherwise negative with the exception of the elements mentioned above in the HPI. No Known Allergies  History reviewed. No pertinent past medical history. Past Surgical History:   Procedure Laterality Date    HIP SURGERY Left     IR THROMB [facility] VEIN  10/17/2022    IR THROMB [id number] 10/17/2022 Mindy Ewing MD [facility] RADIOLOGY SPECIALS    WRIST SURGERY Right      History reviewed. No pertinent family history.   Social History     Socioeconomic History    Marital status: Single     Spouse name: Not on file    Number of children: Not on file    Years of education: Not on file    Highest education level: Not on file   Occupational History    Not on file   Tobacco Use    Smoking status: Never    Smokeless tobacco: Never   Substance and Sexual Activity    Alcohol use: Not Currently    Drug use: Not Currently    Sexual activity: Not on file   Other Topics Concern    Not on file   Social History Narrative    Not on file     Social Determinants of Health     Financial Resource Strain: Not on file   Food Insecurity: Not on file   Transportation Needs: Not on file   Physical Activity: Not on file   Stress: Not on file   Social Connections: Not on file   Intimate Partner Violence: Not on file   Housing Stability: Not on file     Current Facility-Administered Medications   Medication Dose Route Frequency Provider Last Rate Last Admin    phenol 1.4 % mouth spray 1 spray  1 spray Mouth/Throat Q2H PRN Rabia Alegria MD   1 spray at 10/19/22 0441    tuberculin injection 5 Units  5 Units IntraDERmal Once Sanket Oconnor MD   5 Units at 10/19/22 1625    LORazepam (ATIVAN) tablet 1 mg  1 mg Oral BID PRN Rabia Alegria MD   1 mg at 10/20/22 0851    heparin (porcine) injection 7,420 Units  80 Units/kg IntraVENous PRN Sanket Oconnor MD        heparin (porcine) injection 3,710 Units  40 Units/kg IntraVENous PRN Sanket Oconnor MD        heparin 25,000 units in dextrose 5% 250 mL (premix) infusion  5-30 Units/kg/hr IntraVENous Continuous Sanket Oconnor MD 16.7 mL/hr at 10/20/22 0610 18 Units/kg/hr at 10/20/22 0610    oseltamivir (TAMIFLU) capsule 75 mg  75 mg Oral BID Sanket Oconnor MD   75 mg at 10/20/22 7150    sodium chloride flush 0.9 % injection 5-40 mL  5-40 mL IntraVENous 2 times per day Brandy Cardenas MD   10 mL at 10/20/22 0827    sodium chloride flush 0.9 % injection 5-40 mL  5-40 mL IntraVENous PRN Brandy Cardenas MD        0.9 % sodium chloride infusion   IntraVENous PRN Brandy Cardenas MD 25 mL/hr at 10/18/22 1324 New Bag at 10/18/22 1324    ondansetron (ZOFRAN-ODT) disintegrating tablet 4 mg  4 mg Oral Q8H PRN Brandy Cardenas MD        Or    ondansetron [facility]) injection 4 mg  4 mg IntraVENous Q6H PRN Brandy Cardenas MD        polyethylene glycol (GLYCOLAX) packet 17 g  17 g Oral Daily PRN Brandy Cardenas MD        acetaminophen (TYLENOL) tablet 650 mg  650 mg Oral Q6H PRN Brandy Cardenas MD   650 mg at 10/18/22 1740    Or    acetaminophen (TYLENOL) suppository 650 mg  650 mg Rectal Q6H PRN Brandy Cardenas MD        magnesium sulfate 2000 mg in 50 mL IVPB premix  2,000 mg IntraVENous PRN Brandy Cardenas MD        potassium chloride 10 mEq/100 mL IVPB (Peripheral Line)  10 mEq IntraVENous PRN Brandy Cardenas MD        potassium chloride (KLOR-CON M) extended release tablet 40 mEq  40 mEq Oral PRN Wil Coughlin MD        Or    potassium bicarb-citric acid (EFFER-K) effervescent tablet 40 mEq  40 mEq Oral PRN Wil Coughlin MD        Or    potassium chloride 10 mEq/100 mL IVPB (Peripheral Line)  10 mEq IntraVENous PRN Wil Coughlin MD        cefTRIAXone (ROCEPHIN) 1,000 mg in sodium chloride 0.9 % 50 mL IVPB mini-bag  1,000 mg IntraVENous Q24H Wil Coughlin MD   Stopped at 10/19/22 1719    gabapentin (NEURONTIN) capsule 600 mg  600 mg Oral TID Wil Coughlin MD   600 mg at 10/20/22 8238    morphine injection 2 mg  2 mg IntraVENous Q4H PRN Wil Coughlin MD   2 mg at 10/17/22 1814       OBJECTIVE:  Patient Vitals for the past 8 hrs:   BP Temp Temp src Pulse Resp SpO2   10/20/22 0827 123/84 98 °F (36.7 °C) Oral 65 18 96 %   10/20/22 0316 109/63 97.5 °F (36.4 °C) Oral 59 20 99 %     Temp (24hrs), Av.3 °F (36.8 °C), Min:97.5 °F (36.4 °C), Max:99.5 °F (37.5 °C)    No intake/output data recorded. Physical Exam:  Constitutional: Ill-appearing male in no acute distress, sitting comfortably in the hospital bed. HEENT: Normocephalic and atraumatic. Oropharynx is clear, mucous membranes are moist.  Extraocular muscles are intact. Sclerae anicteric. Neck supple without JVD. No thyromegaly present. Skin Warm and dry. No bruising and no rash noted. No erythema. No pallor. Respiratory Lungs are clear to auscultation bilaterally without wheezes, rales or rhonchi, normal air exchange without accessory muscle use. CVS Normal rate, regular rhythm and normal S1 and S2. No murmurs, gallops, or rubs. Abdomen Soft, nontender and nondistended, normoactive bowel sounds. No palpable mass. No hepatosplenomegaly. Neuro Grossly nonfocal with no obvious sensory or motor deficits. MSK Normal range of motion in general.  LE edema and tenderness. Psych Appropriate mood and affect.         Labs:    Recent Results (from the past 24 hour(s))   CBC    Collection Time: 10/20/22  4:03 AM   Result Value Ref Range    WBC 5.4 4.3 - 11.1 K/uL    RBC 3.66 (L) 4.23 - 5.6 M/uL    Hemoglobin 12.3 (L) 13.6 - 17.2 g/dL    Hematocrit 37.8 (L) 41.1 - 50.3 %    .3 (H) 82 - 102 FL    MCH 33.6 (H) 26.1 - 32.9 PG    MCHC 32.5 31.4 - 35.0 g/dL    RDW 12.6 11.9 - 14.6 %    Platelets 938 829 - 275 K/uL    MPV 9.9 9.4 - 12.3 FL    nRBC 0.00 0.0 - 0.2 K/uL   Basic Metabolic Panel    Collection Time: 10/20/22  4:03 AM   Result Value Ref Range    Sodium 138 133 - 143 mmol/L    Potassium 3.9 3.5 - 5.1 mmol/L    Chloride 106 101 - 110 mmol/L    CO2 27 21 - 32 mmol/L    Anion Gap 5 2 - 11 mmol/L    Glucose 94 65 - 100 mg/dL    BUN 6 6 - 23 MG/DL    Creatinine 0.70 (L) 0.8 - 1.5 MG/DL    Est, Glom Filt Rate >60 >60 ml/min/1.73m2    Calcium 9.0 8.3 - 10.4 MG/DL   Anti-Xa, Unfractionated Heparin    Collection Time: 10/20/22  4:03 AM   Result Value Ref Range    Anti-XA Unfrac Heparin 0.34 0.3 - 0.7 IU/mL       ASSESSMENT:  Patient Active Problem List   Diagnosis    Acute deep vein thrombosis (DVT) of popliteal vein of left lower extremity (HCC)    Acute deep vein thrombosis (DVT) of right lower extremity, unspecified vein (HCC)    Cystitis    Influenza A    Hematuria         This is a pleasant 56years old male patient with history of secondary erythrocytosis and chronic neuropathy in his feet (uses walker to ambulate) who was admitted with complaints of lower abdominal pain and left lower extremity pain and swelling that has progressively worsened over the last week. He was recently prescribed Bactrim by his primary care physician for treatment of suspected UTI. Reports a few days of dark-colored urine. Ultrasound showed extensive DVT of left lower extremity with occlusion of the left common femoral vein, profunda and femoral vein. Nonocclusive thrombus was seen in the great saphenous vein and partially occlusive thrombus within the popliteal vein. One of the posterior tibial veins is occluded.   CT abdomen showed infrarenal IVC filter with suggestion of large thrombus burden distal to the IVC filter and prominent perivascular stranding. Some of the intra device thrombus appears to extend superior/proximal to the filter. Findings likely representing cystitis were also seen. No evidence of pyelonephritis. On evaluation today, patient reports no significant improvement in left lower extremity pain since admission. He is unable to recall anything relating to an IVC filter placement. On further probing, he provides a history of having what sounds like intracranial hemorrhage many years ago for which he required evacuation of hematoma. He required ICU stay and was unconscious for many days. He thinks that might be the only time that an IVC filter may have been placed but is not sure about any other episodes of VTE in his life. He denies any recent prolonged travel, immobilization, long bone fracture, major surgeries, abdominal distention, new/unusual sites of bone pain, swollen glands in the body, night sweats, fever, headache or unintentional weight loss. Notably was seen by Dr. Loida Stanley [facility] oncologist) in April of this year for erythrocytosis. JAK2 negative, erythropoietin normal.     Patient has been started on heparin infusion. PLAN:  Presence of IVC filter suggests previous history of DVT. Current episode is felt to be a recurrent event. Patient will therefore require indefinite anticoagulation in absence of contraindications. This was discussed at length with the patient. Send testing for beta-2 glycoprotein and anticardiolipin antibodies. If negative, he can be transitioned to one of the DOACs (apixaban or Xarelto) at discharge.   Also send peripheral blood flow cytometry for CD55 and CD59 testing (complaint of dark urine, thrombosis)  Recommend age-appropriate cancer screening as outpatient (colonoscopy, PSA testing after discussion of risk versus benefit with the patient)

## 2022-10-20 NOTE — PROGRESS NOTES
Pt awake in bed at this time. Pt is agitated but cooperative. Respirations are even and unlabored on room air, no signs or symptoms of distress at this time. Heparin ggt 18. No complaints of pain. Pt instructed to call with needs. Call light in reach, safety measures in place. Will continue to monitor and prepare to give report to oncoming shift.

## 2022-10-21 LAB
ANION GAP SERPL CALC-SCNC: 11 MMOL/L (ref 2–11)
BUN SERPL-MCNC: 6 MG/DL (ref 6–23)
CALCIUM SERPL-MCNC: 8.5 MG/DL (ref 8.3–10.4)
CHLORIDE SERPL-SCNC: 105 MMOL/L (ref 101–110)
CO2 SERPL-SCNC: 20 MMOL/L (ref 21–32)
CREAT SERPL-MCNC: 0.6 MG/DL (ref 0.8–1.5)
ERYTHROCYTE [DISTWIDTH] IN BLOOD BY AUTOMATED COUNT: 12.7 % (ref 11.9–14.6)
FLOW CYTOMETRY RESULTS: NORMAL
GLUCOSE SERPL-MCNC: 107 MG/DL (ref 65–100)
HCT VFR BLD AUTO: 36 % (ref 41.1–50.3)
HGB BLD-MCNC: 12 G/DL (ref 13.6–17.2)
MCH RBC QN AUTO: 33.2 PG (ref 26.1–32.9)
MCHC RBC AUTO-ENTMCNC: 33.3 G/DL (ref 31.4–35)
MCV RBC AUTO: 99.7 FL (ref 82–102)
MM INDURATION, POC: 0 MM (ref 0–5)
NRBC # BLD: 0 K/UL (ref 0–0.2)
PLATELET # BLD AUTO: 262 K/UL (ref 150–450)
PMV BLD AUTO: 8.9 FL (ref 9.4–12.3)
POTASSIUM SERPL-SCNC: 4.9 MMOL/L (ref 3.5–5.1)
PPD, POC: NEGATIVE
RBC # BLD AUTO: 3.61 M/UL (ref 4.23–5.6)
SODIUM SERPL-SCNC: 136 MMOL/L (ref 133–143)
SPECIMEN SOURCE: NORMAL
TEST ORDERED: NORMAL
WBC # BLD AUTO: 5 K/UL (ref 4.3–11.1)

## 2022-10-21 PROCEDURE — 85027 COMPLETE CBC AUTOMATED: CPT

## 2022-10-21 PROCEDURE — 36415 COLL VENOUS BLD VENIPUNCTURE: CPT

## 2022-10-21 PROCEDURE — 80048 BASIC METABOLIC PNL TOTAL CA: CPT

## 2022-10-21 PROCEDURE — 6370000000 HC RX 637 (ALT 250 FOR IP): Performed by: INTERNAL MEDICINE

## 2022-10-21 PROCEDURE — 6370000000 HC RX 637 (ALT 250 FOR IP): Performed by: STUDENT IN AN ORGANIZED HEALTH CARE EDUCATION/TRAINING PROGRAM

## 2022-10-21 PROCEDURE — 97530 THERAPEUTIC ACTIVITIES: CPT

## 2022-10-21 PROCEDURE — 1100000000 HC RM PRIVATE

## 2022-10-21 PROCEDURE — 6370000000 HC RX 637 (ALT 250 FOR IP): Performed by: FAMILY MEDICINE

## 2022-10-21 PROCEDURE — 2580000003 HC RX 258: Performed by: STUDENT IN AN ORGANIZED HEALTH CARE EDUCATION/TRAINING PROGRAM

## 2022-10-21 RX ADMIN — CLOPIDOGREL BISULFATE 75 MG: 75 TABLET ORAL at 08:54

## 2022-10-21 RX ADMIN — GABAPENTIN 600 MG: 300 CAPSULE ORAL at 20:50

## 2022-10-21 RX ADMIN — OSELTAMIVIR PHOSPHATE 75 MG: 75 CAPSULE ORAL at 20:50

## 2022-10-21 RX ADMIN — ASPIRIN 81 MG: 81 TABLET ORAL at 08:54

## 2022-10-21 RX ADMIN — SODIUM CHLORIDE, PRESERVATIVE FREE 5 ML: 5 INJECTION INTRAVENOUS at 20:50

## 2022-10-21 RX ADMIN — SODIUM CHLORIDE, PRESERVATIVE FREE 10 ML: 5 INJECTION INTRAVENOUS at 08:54

## 2022-10-21 RX ADMIN — GABAPENTIN 600 MG: 300 CAPSULE ORAL at 08:54

## 2022-10-21 RX ADMIN — APIXABAN 10 MG: 5 TABLET, FILM COATED ORAL at 08:54

## 2022-10-21 RX ADMIN — GABAPENTIN 600 MG: 300 CAPSULE ORAL at 14:40

## 2022-10-21 RX ADMIN — LORAZEPAM 1 MG: 1 TABLET ORAL at 09:44

## 2022-10-21 RX ADMIN — APIXABAN 10 MG: 5 TABLET, FILM COATED ORAL at 20:50

## 2022-10-21 RX ADMIN — OSELTAMIVIR PHOSPHATE 75 MG: 75 CAPSULE ORAL at 08:54

## 2022-10-21 NOTE — PROGRESS NOTES
Pt is on room air. Pt has no complaints of pain or discomfort at this time. Pt has coleman draining. Pt is alert and oriented times 4. Call light is in place.

## 2022-10-21 NOTE — CARE COORDINATION
LOS 4 D    CM following for discharge needs. CM followed up with Edelmira at [facility] regarding LOC. Edelmira informed this CM that patient has [employer]. Not Medicaid. No LOC required. Rizwan Amezcua has intiated prior auth from [employer] for [employer] at [facility]. DCP: Lucretia. PPD active. CM following. Addendum: CM received a message to contact patient. CM called patient's cell and patient reports he prefers to discharge home instead of rehab. CM sent Dr Mayelin Warner a message in [facility]. Dr Mayelin Warner replied patient will discharge home 10/22 with home health. CM placed order for home health. With no preference for agency, [facility] referral sent.

## 2022-10-21 NOTE — PROGRESS NOTES
ACUTE PHYSICAL THERAPY GOALS:   (Developed with and agreed upon by patient and/or caregiver. )  LTG:  (1.)Mr. Yovanny Martinez will move from supine to sit and sit to supine , scoot up and down, and roll side to side in bed with MODIFIED INDEPENDENCE within 7 treatment day(s). (2.)Mr. Yovanny Martinez will transfer from bed to chair and chair to bed with STAND BY ASSIST using the least restrictive device within 7 treatment day(s). (3.)Mr. Antoine will ambulate with CONTACT GUARD ASSIST for 100 feet with the least restrictive device within 7 treatment day(s). (4.)Mr. Yovanny Martinez will participate in therapeutic activity/exercises x 25 minutes for increased strength within 7 treatment days. (5.)Mr. Antoine will perform standing static and dynamic balance activities x 15 minutes with STAND BY ASSIST to improve safety within 7 day(s). PHYSICAL THERAPY: Daily Note PM   (Link to Caseload Tracking: PT Visit Days : 3  Time In/Out PT Charge Capture  Rehab Caseload Tracker  Orders    Anika Sood is a 56 y.o. male   PRIMARY DIAGNOSIS: <principal problem not specified>  Acute deep vein thrombosis (DVT) of right lower extremity, unspecified vein (HCC) [I82.401]    4 Days Post-Op  Inpatient: Payor: Zhou Ashby / Plan: Jareth Harmon [place] MEDICAID / Product Type: *No Product type* /     ASSESSMENT:     REHAB RECOMMENDATIONS:   Recommendation to date pending progress:  Setting:  Short-term Rehab    Equipment:    To Be Determined     ASSESSMENT:  Mr. Yovanny Martinez was supine in bed on arrival. He is agreeable to PT and plans to go to rehab at discharge. Supine to sit on EOB with SBA. Talked about walker safety. Pt put one foot outside of the walker to stand. Pt with very wide base of support which he reports is baseline and decreased bilateral knee flexion during gait. Walks with small, quick steps and some lateral trunk sway, increased trunk flexion. He was able to ambulate [de-identified]' with RW. Practiced sit to stand x5 with BLE inside the walker. Pre-gait exercises performed while standing on left and right. Pt returned to supine with needs in reach. SUBJECTIVE:   Mr. Chinmay Alberto states, \"I don't like the chair\"     Social/Functional Lives With: Alone  ADL Assistance: Independent  Homemaking Assistance: Independent  Homemaking Responsibilities: Yes  Ambulation Assistance: Independent  Active :  (unknown)  OBJECTIVE:     PAIN: Dheeraj Hummingbird / O2: Jim Terrell / Tellis Merlin / Guerita Nessa:   Pre Treatment:  none         Post Treatment: 0/10 Vitals        Oxygen on RA    Sandoval Catheter    RESTRICTIONS/PRECAUTIONS:        MOBILITY: I Mod I S SBA CGA Min Mod Max Total  NT x2 Comments:   Bed Mobility    Rolling [] [] [] [x] [] [] [] [] [] [] []    Supine to Sit [] [] [] [x] [] [] [] [] [] [] []    Scooting [] [] [] [x] [] [] [] [] [] [] []    Sit to Supine [] [] [] [x] [] [] [] [] [] [] []    Transfers    Sit to Stand [] [] [] [x] [] [] [] [] [] [] []    Bed to Chair [] [] [] [x] [] [] [] [] [] [] []    Stand to Sit [] [] [] [x] [] [] [] [] [] [] []     [] [] [] [] [] [] [] [] [] [] []    I=Independent, Mod I=Modified Independent, S=Supervision, SBA=Standby Assistance, CGA=Contact Guard Assistance,   Min=Minimal Assistance, Mod=Moderate Assistance, Max=Maximal Assistance, Total=Total Assistance, NT=Not Tested    BALANCE: Good Fair+ Fair Fair- Poor NT Comments   Sitting Static [x] [] [] [] [] []    Sitting Dynamic [x] [] [] [] [] []              Standing Static [] [] [x] [] [] []    Standing Dynamic [] [] [x] [] [] []      GAIT: I Mod I S SBA CGA Min Mod Max Total  NT x2 Comments:   Level of Assistance [] [] [] [x] [x] [] [] [] [] [] [] Pt with very wide HARRY, has one foot constantly outside base of walker and reports this is his baseline. Does have wider rollator that he uses and states both feet fit inside rollator. Noted decreased knee flexion during gait with forward flexed posture and small, quick steps.    Distance 80 feet    DME Rolling Walker    Gait Quality Trunk sway increased and Wide base of support    Weightbearing Status      Stairs      I=Independent, Mod I=Modified Independent, S=Supervision, SBA=Standby Assistance, CGA=Contact Guard Assistance,   Min=Minimal Assistance, Mod=Moderate Assistance, Max=Maximal Assistance, Total=Total Assistance, NT=Not Tested    PLAN:   FREQUENCY AND DURATION: 3 times/week for duration of hospital stay or until stated goals are met, whichever comes first.    TREATMENT:   TREATMENT:   Therapeutic Activity (25 Minutes): Therapeutic activity included Rolling, Supine to Sit, Sit to Supine, Scooting, Transfer Training, Ambulation on level ground, Sitting balance , and Standing balance to improve functional Activity tolerance, Balance, Coordination, Mobility, and Strength.     TREATMENT GRID:  N/A    AFTER TREATMENT PRECAUTIONS: Bed, Bed/Chair Locked, Call light within reach, and Needs within reach    INTERDISCIPLINARY COLLABORATION:  RN/ PCT and PT/ PTA    EDUCATION:      TIME IN/OUT:  Time In: 1455  Time Out: 176 Pari Salinas  Minutes: 25    Ilir Cuba PTA

## 2022-10-21 NOTE — PROGRESS NOTES
Pt resting in bed awake. Alert and oriented times 3 at this time. Pt on RA. No s/sx of distress noted. Denies any pain. Encouraged to call for assistance as needed. Call light within reach. Will continue to monitor.

## 2022-10-21 NOTE — PROGRESS NOTES
Progress Note    Patient: Garfield Chaudhari MRN: [id number]  SSN: [id number]    YOB: 1965  Age: 56 y.o. Sex: male      Admit Date: 10/17/2022    LOS: 4 days     Assessment and Plan:   55 yo male with PMH of polycythemia vera, s/p remote IVC filter placed and retained, recent UTI on bactrim, admitted with LLE edema, UTI. 1.  Extensive left lower extremity DVT and thrombosed IVC filter  -Status post EKOS 10/17/2022  -Status post thrombectomy with stent 10/18/2022  -Continue Eliquis, aspirin and Plavix  -Follow hypercoagulable studies  -Symptomatic management  -IR and hematology recommendations appreciated    2. Urinary tract infection  -Continue ceftriaxone  -Renal ultrasound negative  -Blood cultures negative    3. Influenza A infection  -Continue Tamiflu  -Chest x-ray without pneumonia  -Symptomatic management    4. Hematuria  -Monitor for now  -Urology recommendations appreciated, outpatient cystoscopy    5. Macrocytic anemia  -Obtain B12 levels  -Monitor H&H  Transfuse if hemoglobin lower than 7    DVT prophylaxis with Eliquis    Subjective:   55 yo male with PMH of polycythemia vera, s/p remote IVC filter placed and retained, recent UTI on bactrim, admitted with LLE edema, UTI. Patient seen and examined at bedside. This morning having some left lower extremity pain. Denies any chest pain, no abdominal pain, no vomiting.     Objective:     Vitals:    10/20/22 2344 10/21/22 0411 10/21/22 0739 10/21/22 1045   BP: 110/80 126/83 121/87 118/77   Pulse: 80 71 80 75   Resp: 20 20 17 16   Temp: 98 °F (36.7 °C) 98.8 °F (37.1 °C) 97.9 °F (36.6 °C) 98 °F (36.7 °C)   TempSrc: Axillary Oral Oral Oral   SpO2: 98% 97% 99% 99%   Weight:       Height:            Intake and Output:  Current Shift: 10/21 0701 - 10/21 1900  In: 320 [P.O.:320]  Out: 450 [Urine:450]  Last three shifts: 10/19 1901 - 10/21 0700  In: 360 [P.O.:360]  Out: 9550 [Urine:9550]    ROS  10 ROS negative except from stated on subjective    Physical Exam:   General: Alert, oriented, NAD  HEENT: NC/AT, EOM are intact  Neck: supple, no JVD  Cardiovascular: RRR, S1, S2, no murmurs  Respiratory: Lungs are clear, no wheezes or rales  Abdomen: Soft, NT, ND  Back: No CVA tenderness, no paraspinal tenderness  Extremities: LE without pedal edema, no erythema  Neuro: A&O, CN are intact, no focal deficits  Skin: no rash or ulcers  Psych: good mood and affect    Lab/Data Review:  I have personally reviewed patients laboratory data showing  Recent Results (from the past 24 hour(s))   PLEASE READ & DOCUMENT PPD TEST IN 24 HRS    Collection Time: 10/20/22  3:41 PM   Result Value Ref Range    PPD, (POC) Negative Negative    mm Induration 0.0 0 - 5 mm   Basic Metabolic Panel    Collection Time: 10/21/22  4:49 AM   Result Value Ref Range    Sodium 136 133 - 143 mmol/L    Potassium 4.9 3.5 - 5.1 mmol/L    Chloride 105 101 - 110 mmol/L    CO2 20 (L) 21 - 32 mmol/L    Anion Gap 11 2 - 11 mmol/L    Glucose 107 (H) 65 - 100 mg/dL    BUN 6 6 - 23 MG/DL    Creatinine 0.60 (L) 0.8 - 1.5 MG/DL    Est, Glom Filt Rate >60 >60 ml/min/1.73m2    Calcium 8.5 8.3 - 10.4 MG/DL   CBC    Collection Time: 10/21/22  7:30 AM   Result Value Ref Range    WBC 5.0 4.3 - 11.1 K/uL    RBC 3.61 (L) 4.23 - 5.6 M/uL    Hemoglobin 12.0 (L) 13.6 - 17.2 g/dL    Hematocrit 36.0 (L) 41.1 - 50.3 %    MCV 99.7 82 - 102 FL    MCH 33.2 (H) 26.1 - 32.9 PG    MCHC 33.3 31.4 - 35.0 g/dL    RDW 12.7 11.9 - 14.6 %    Platelets 459 964 - 039 K/uL    MPV 8.9 (L) 9.4 - 12.3 FL    nRBC 0.00 0.0 - 0.2 K/uL      [unfilled]     Image:  I have personally reviewed patients imaging showing  IR GUIDED THROMB MECH NONCOR ART SBSQ VESSEL   Final Result   1. Residual DVT in the bilateral pelvic veins, right femoral vein and IVC. 2. Successful thrombectomy of the bilateral lower extremities in the IVC.    3. Residual stenosis of the left common iliac vein, greater than 70%, compatible   with May Thurner syndrome. 4. Successful stenting of the stenosis with marked angiographic improvement. Plan: Continue oral anticoagulation for 6 months. Plavix for 6 months. Aspirin   for life. US RETROPERITONEAL COMPLETE   Final Result   1. No hydronephrosis of the kidneys to indicate acute obstruction. XR CHEST PORTABLE   Final Result   The lungs are clear. The heart is normal in size. No pneumothorax. No pleural effusions. Proximal left humeral fixation hardware is partially   imaged. Posttraumatic changes distal left clavicle. Old healed posterior lateral   left fourth rib fracture deformity also noted. IR GUIDED THROMB [facility] VEIN   Final Result   1. Extensive DGI of the left lower extremity, pelvis and IVC to the level of the   IVC filter. 2. After placement of an ultrasound assisted thrombolysis infusion catheter,   pharmacologic thrombolysis will be initiated. .      Plan: ICU observation. The patient will return the following day for repeat   evaluation and probable further intervention to any residual thrombus and/or   stenoses.            Current Facility-Administered Medications   Medication Dose Route Frequency Provider Last Rate Last Admin    [START ON 10/27/2022] apixaban (ELIQUIS) tablet 5 mg  5 mg Oral BID Kaley Skaggs MD        apixaban Enrique Litten) tablet 10 mg  10 mg Oral BID Kaley Skaggs MD   10 mg at 10/21/22 9808    aspirin EC tablet 81 mg  81 mg Oral Daily Kaley Skaggs MD   81 mg at 10/21/22 0854    clopidogrel (PLAVIX) tablet 75 mg  75 mg Oral Daily Kaley Skaggs MD   75 mg at 10/21/22 0854    phenol 1.4 % mouth spray 1 spray  1 spray Mouth/Throat Q2H PRN Lashonda Delgado MD   1 spray at 10/19/22 0441    LORazepam (ATIVAN) tablet 1 mg  1 mg Oral BID PRN Lashonda Delgado MD   1 mg at 10/21/22 0944    oseltamivir (TAMIFLU) capsule 75 mg  75 mg Oral BID Mary Ellen Spring MD   75 mg at 10/21/22 0854    sodium chloride flush 0.9 % injection 5-40 mL  5-40 mL IntraVENous 2 times per day Nasrin Fonseca MD   10 mL at 10/21/22 0854    sodium chloride flush 0.9 % injection 5-40 mL  5-40 mL IntraVENous PRN Nasrin Fonseca MD        0.9 % sodium chloride infusion   IntraVENous PRN Nasrin Fonseca MD 25 mL/hr at 10/18/22 1324 New Bag at 10/18/22 1324    ondansetron (ZOFRAN-ODT) disintegrating tablet 4 mg  4 mg Oral Q8H PRN Nasrin Fonseca MD        Or    ondansetron [facility]) injection 4 mg  4 mg IntraVENous Q6H PRN Nasrin Fonseca MD        polyethylene glycol (GLYCOLAX) packet 17 g  17 g Oral Daily PRN Nasrin Fonseca MD        acetaminophen (TYLENOL) tablet 650 mg  650 mg Oral Q6H PRN Nasrin Fonseca MD   650 mg at 10/18/22 1740    Or    acetaminophen (TYLENOL) suppository 650 mg  650 mg Rectal Q6H PRN Nasrin Fonseca MD        magnesium sulfate 2000 mg in 50 mL IVPB premix  2,000 mg IntraVENous PRN Nasrin Fonseca MD        potassium chloride 10 mEq/100 mL IVPB (Peripheral Line)  10 mEq IntraVENous PRN Nasrin Fonseca MD        potassium chloride (KLOR-CON M) extended release tablet 40 mEq  40 mEq Oral PRN Nasrin Fonseca MD        Or    potassium bicarb-citric acid (EFFER-K) effervescent tablet 40 mEq  40 mEq Oral PRN Nasrin Fonseca MD        Or    potassium chloride 10 mEq/100 mL IVPB (Peripheral Line)  10 mEq IntraVENous PRN Nasrin Fonseca MD        gabapentin (NEURONTIN) capsule 600 mg  600 mg Oral TID Nasrin Fonseca MD   600 mg at 10/21/22 0854    morphine injection 2 mg  2 mg IntraVENous Q4H PRN Nasrin Fonseca MD   2 mg at 10/17/22 [facility] problems     Patient Active Problem List   Diagnosis    Acute deep vein thrombosis (DVT) of popliteal vein of left lower extremity (HCC)    Acute deep vein thrombosis (DVT) of right lower extremity, unspecified vein (HCC)    Cystitis    Influenza A    Hematuria    Deep vein thrombosis (DVT) of lower extremity ([address]        I have reviewed, updated, and verified this note's content and spent 36 minutes of my 40 minutes visit performing counseling and coordination of care regarding medical management.        Signed By: Easton Ling MD     October 21, 2022

## 2022-10-22 VITALS
WEIGHT: 203.26 LBS | HEART RATE: 75 BPM | TEMPERATURE: 98.4 F | RESPIRATION RATE: 18 BRPM | OXYGEN SATURATION: 100 % | HEIGHT: 76 IN | DIASTOLIC BLOOD PRESSURE: 77 MMHG | SYSTOLIC BLOOD PRESSURE: 121 MMHG | BODY MASS INDEX: 24.75 KG/M2

## 2022-10-22 PROCEDURE — 2580000003 HC RX 258: Performed by: STUDENT IN AN ORGANIZED HEALTH CARE EDUCATION/TRAINING PROGRAM

## 2022-10-22 PROCEDURE — 6370000000 HC RX 637 (ALT 250 FOR IP): Performed by: STUDENT IN AN ORGANIZED HEALTH CARE EDUCATION/TRAINING PROGRAM

## 2022-10-22 PROCEDURE — 6370000000 HC RX 637 (ALT 250 FOR IP): Performed by: INTERNAL MEDICINE

## 2022-10-22 PROCEDURE — 6370000000 HC RX 637 (ALT 250 FOR IP): Performed by: FAMILY MEDICINE

## 2022-10-22 RX ORDER — ASPIRIN 81 MG/1
81 TABLET ORAL DAILY
Qty: 30 TABLET | Refills: 3 | Status: SHIPPED | OUTPATIENT
Start: 2022-10-23

## 2022-10-22 RX ORDER — CLOPIDOGREL BISULFATE 75 MG/1
75 TABLET ORAL DAILY
Qty: 30 TABLET | Refills: 3 | Status: SHIPPED | OUTPATIENT
Start: 2022-10-23 | End: 2022-10-22 | Stop reason: HOSPADM

## 2022-10-22 RX ORDER — OSELTAMIVIR PHOSPHATE 75 MG/1
75 CAPSULE ORAL 2 TIMES DAILY
Qty: 3 CAPSULE | Refills: 0 | Status: SHIPPED | OUTPATIENT
Start: 2022-10-22 | End: 2022-10-24

## 2022-10-22 RX ADMIN — SODIUM CHLORIDE, PRESERVATIVE FREE 5 ML: 5 INJECTION INTRAVENOUS at 09:16

## 2022-10-22 RX ADMIN — LORAZEPAM 1 MG: 1 TABLET ORAL at 12:02

## 2022-10-22 RX ADMIN — CLOPIDOGREL BISULFATE 75 MG: 75 TABLET ORAL at 09:15

## 2022-10-22 RX ADMIN — ASPIRIN 81 MG: 81 TABLET ORAL at 09:15

## 2022-10-22 RX ADMIN — APIXABAN 10 MG: 5 TABLET, FILM COATED ORAL at 09:15

## 2022-10-22 RX ADMIN — OSELTAMIVIR PHOSPHATE 75 MG: 75 CAPSULE ORAL at 09:16

## 2022-10-22 RX ADMIN — GABAPENTIN 600 MG: 300 CAPSULE ORAL at 09:15

## 2022-10-22 NOTE — PROGRESS NOTES
Discharge instructions explained to patient. Understanding verbalized of all discharge instructions, both written and verbal. Adequate time given for questions. All questions asked/answered. Discharge medications reviewed as appropriate and Rx's called to patient's pharmacy of choice. PIV removed and bandage applied.

## 2022-10-22 NOTE — DISCHARGE SUMMARY
Hospitalist Discharge Summary   Admit Date:  10/17/2022  6:05 PM   DC Note date: 10/22/2022  Name:  Tacho Ortega   Age:  56 y.o. Sex:  male  :  1965   MRN:  [id number]   Room:  [facility]  PCP:  Shaka Freeman MD    Presenting Complaint: No chief complaint on file. Initial Admission Diagnosis: Acute deep vein thrombosis (DVT) of right lower extremity, unspecified vein (HCC) [I82.401]     Problem List for this Hospitalization (present on admission): Active Problems:    Acute deep vein thrombosis (DVT) of popliteal vein of left lower extremity (HCC)    Cystitis    Influenza A    Deep vein thrombosis (DVT) of lower extremity (HCC)  Resolved Problems:    Hematuria      Hospital Course:  55 yo male with PMH of polycythemia vera, s/p remote IVC filter placed and retained, recent UTI on bactrim, admitted with LLE edema and UTI. He was found to have LE DVTs and went for thrombectomy and started on Eliquis. He was found to have the flu and started on Tamiflu. He completed a course of antibiotics for his UTI. He declined going to [facility]. He remained stable and was discharged home. Disposition: Home  Diet: ADULT DIET; Regular  Code Status: Full Code    Follow Ups:   Contact information for after-discharge care     Discharge [address] [facility]) . Service: Inpatient Rehabilitation  Contact information:  [address]  [place] [address] [id number] [phone]                           Time spent in patient discharge and coordination 35 minutes. Follow up labs/diagnostics (ultimately defer to outpatient provider):  None    Plan was discussed with patient, nursing, and case management. All questions answered. Patient was stable at time of discharge. Instructions given to call a physician or return if any concerns.     Current Discharge Medication List        START taking these medications    Details   oseltamivir (TAMIFLU) 75 MG capsule Take 1 capsule by mouth 2 times daily for 3 doses  Qty: 3 capsule, Refills: 0      aspirin 81 MG EC tablet Take 1 tablet by mouth daily  Qty: 30 tablet, Refills: 3      apixaban (ELIQUIS) 5 MG TABS tablet Take 2 tablets by mouth 2 times daily for 6 days, THEN 1 tablet 2 times daily. Qty: 84 tablet, Refills: 2           CONTINUE these medications which have NOT CHANGED    Details   LORazepam (ATIVAN) 1 MG tablet Take 1 mg by mouth.      gabapentin (NEURONTIN) 100 MG capsule Take 600 mg by mouth 3 times daily. Procedures done this admission:  * No procedures listed *    Consults this admission:  IP CONSULT TO INTERVENTIONAL RADIOLOGY  IP CONSULT TO INTERVENTIONAL RADIOLOGY  IP CONSULT TO UROLOGY  IP CONSULT TO CASE MANAGEMENT  IP CONSULT TO ONCOLOGY  IP CONSULT HOME HEALTH    Echocardiogram results:  No results found for this or any previous visit. Diagnostic Imaging/Tests:   CT ABDOMEN PELVIS W IV CONTRAST Additional Contrast? None    Result Date: 10/15/2022  1. Infrarenal IVC filter with suggestion of large thrombus burden distal to the IVC filter and prominent perivascular stranding left greater than right. In addition, some of the intra device thrombus appears to extend superior/proximal to the filter. 2.  Findings likely representing cystitis with features concerning for ascending infection. No CT evidence of pyelonephritis. XR CHEST PORTABLE    Result Date: 10/18/2022  The lungs are clear. The heart is normal in size. No pneumothorax. No pleural effusions. Proximal left humeral fixation hardware is partially imaged. Posttraumatic changes distal left clavicle. Old healed posterior lateral left fourth rib fracture deformity also noted. IR GUIDED THROMB [facility] ART SBSQ VESSEL    Result Date: 10/20/2022  1. Residual DVT in the bilateral pelvic veins, right femoral vein and IVC. 2. Successful thrombectomy of the bilateral lower extremities in the IVC.  3. Residual stenosis of the left common iliac vein, greater than 70%, compatible with May Thurner syndrome. 4. Successful stenting of the stenosis with marked angiographic improvement. Plan: Continue oral anticoagulation for 6 months. Plavix for 6 months. Aspirin for life. IR GUIDED THROMB \[facility]\"" VEIN    Result Date: 10/18/2022  1. Extensive DGI of the left lower extremity, pelvis and IVC to the level of the IVC filter. 2. After placement of an ultrasound assisted thrombolysis infusion catheter, pharmacologic thrombolysis will be initiated. . Plan: ICU observation. The patient will return the following day for repeat evaluation and probable further intervention to any residual thrombus and/or stenoses. US RETROPERITONEAL COMPLETE    Result Date: 10/18/2022  1. No hydronephrosis of the kidneys to indicate acute obstruction. Vascular duplex lower extremity venous left    Result Date: 10/15/2022  Extensive left lower extremity DVT. Labs: Results:       BMP, Mg, Phos Recent Labs     10/20/22  0403 10/21/22  0449    136   K 3.9 4.9    105   CO2 27 20*   ANIONGAP 5 11   BUN 6 6   CREATININE 0.70* 0.60*   LABGLOM >60 >60   CALCIUM 9.0 8.5   GLUCOSE 94 107*      CBC Recent Labs     10/20/22  0403 10/21/22  0730   WBC 5.4 5.0   RBC 3.66* 3.61*   HGB 12.3* 12.0*   HCT 37.8* 36.0*   .3* 99.7   MCH 33.6* 33.2*   MCHC 32.5 33.3   RDW 12.6 12.7    262   MPV 9.9 8.9*   NRBC 0.00 0.00      LFT No results for input(s): BILITOT, BILIDIR, ALKPHOS, AST, ALT, PROT, LABALBU, GLOB in the last 72 hours.    Cardiac  Lab Results   Component Value Date/Time    NTPROBNP 55 10/15/2022 11:17 AM      Coags Lab Results   Component Value Date/Time    PROTIME 17.4 10/18/2022 03:55 AM    PROTIME 15.0 10/15/2022 03:57 PM    INR 1.4 10/18/2022 03:55 AM    INR 1.1 10/15/2022 03:57 PM    APTT 64.1 10/18/2022 09:33 AM    APTT 61.1 10/18/2022 03:55 AM    APTT 52.6 10/17/2022 10:22 PM      A1c No results found for: LABA1C, EAG   Lipids No results found for: CHOL, LDLCALC, LABVLDL, HDL, CHOLHDLRATIO, TRIG   Thyroid  No results found for: Eual Beer     Most Recent UA Lab Results   Component Value Date/Time    COLORU YELLOW/STRAW 10/18/2022 11:22 AM    APPEARANCE CLEAR 10/18/2022 11:22 AM    SPECGRAV 1.015 10/18/2022 11:22 AM    LABPH 7.0 10/18/2022 11:22 AM    PROTEINU 100 10/18/2022 11:22 AM    GLUCOSEU Negative 10/18/2022 11:22 AM    KETUA TRACE 10/18/2022 11:22 AM    BILIRUBINUR Negative 10/18/2022 11:22 AM    BLOODU Negative 10/18/2022 11:22 AM    UROBILINOGEN 1.0 10/18/2022 11:22 AM    NITRU Negative 10/18/2022 11:22 AM    LEUKOCYTESUR Negative 10/18/2022 11:22 AM    WBCUA 0-3 10/15/2022 10:58 PM    RBCUA 0-3 10/18/2022 11:22 AM    EPITHUA 0-3 10/15/2022 10:58 PM    BACTERIA 0 10/18/2022 11:22 AM    LABCAST 0 10/15/2022 10:58 PM    MUCUS 0 10/15/2022 10:58 PM        Recent Labs     10/18/22  1024   CULTURE NO GROWTH 4 DAYS  NO GROWTH 4 DAYS       All Labs from Last 24 Hrs:  No results found for this or any previous visit (from the past 24 hour(s)).     No Known Allergies  Immunization History   Administered Date(s) Administered    PPD Test 10/19/2022       Recent Vital Data:  Patient Vitals for the past 24 hrs:   Temp Pulse Resp BP SpO2   10/22/22 0340 97.6 °F (36.4 °C) -- 18 121/80 97 %   10/21/22 2342 98.6 °F (37 °C) -- 20 126/80 100 %   10/21/22 2004 97.3 °F (36.3 °C) 80 20 129/82 100 %   10/21/22 1451 98.3 °F (36.8 °C) 81 18 122/74 97 %       Oxygen Therapy  SpO2: 97 %  Pulse Oximetry Type: Continuous  Pulse via Oximetry: 76 beats per minute  Pulse Oximeter Device Mode: Continuous  Pulse Oximeter Device Location: Left, Finger  O2 Device: None (Room air)  Oximetry Probe Site Changed: Yes  Skin Assessment: Clean, dry, & intact  Skin Protection for O2 Device: N/A  O2 Flow Rate (L/min): 2 L/min  End Tidal CO2: 44 (%)  Oxygen Therapy: None (Room air)    Estimated body mass index is 24.74 kg/m² as calculated from the following:    Height as of this encounter: 6' 4\" (1.93 m). Weight as of this encounter: 203 lb 4.2 oz (92.2 kg). Intake/Output Summary (Last 24 hours) at 10/22/2022 1108  Last data filed at 10/22/2022 0916  Gross per 24 hour   Intake 1905 ml   Output 3052 ml   Net -1147 ml         Physical Exam:  General:    Well nourished. No overt distress  Head:  Normocephalic, atraumatic  Eyes:  Sclerae appear normal.  Pupils equally round. HENT:  Nares appear normal, no drainage. Moist mucous membranes  Neck:  No restricted ROM. Trachea midline  CV:   RRR. No m/r/g. No JVD  Lungs:   CTAB. No wheezing, rhonchi, or rales. Respirations even, unlabored  Abdomen:   Soft, nontender, nondistended. Extremities: Warm and dry. No cyanosis or clubbing. No edema. Skin:     No rashes. Normal coloration  Neuro:  CN II-XII grossly intact. Psych:  Normal mood and affect.     Signed:  Genna Ellis DO

## 2022-10-22 NOTE — FLOWSHEET NOTE
Patient on room air. Safety measures noted. Will continue to monitor per policy. 10/22/22 6269   Handoff   Communication Given Shift Handoff   Handoff Received From KRISH Bauman   Handoff Communication Face to Face; At bedside   Time Handoff Given 0203

## 2022-10-22 NOTE — CARE COORDINATION
Patient will be d/c home today. Pervious CM has made a referral to [facility]. CM made contact with patient via phone and informed him that a referral has been made to [facility]. Patient was agreeable and requested that his contact # be corrected. CM updated contact information. Family will transport home. Patient has met all treatment goals / milestones. CM will continue to monitor and remain available for any needs that may occur. 10/18/22 6921   Service Assessment   Patient Orientation Alert and Oriented   Cognition Alert   History Provided By Medical Record   Primary Caregiver Self   Support Systems Parent   Patient's Healthcare Decision Maker is: Legal Next of Kin   PCP Verified by CM Yes   Prior Functional Level Independent in ADLs/IADLs   Current Functional Level Other (see comment)  (ICU)   Can patient return to prior living arrangement Unknown at present   Ability to make needs known: Good   Family able to assist with home care needs: Other (comment)  (unknown)   Would you like for me to discuss the discharge plan with any other family members/significant others, and if so, who? Yes   Financial Resources Medicare  [facility])   Social/Functional History   Lives With Alone   ADL Assistance Independent   Homemaking Assistance Independent   Homemaking Responsibilities Yes   Ambulation Assistance Independent   Active    (unknown)   Discharge Planning   Current Services Prior To Admission Durable Medical Equipment;None   Current DME Prior to [facility]   Patient expects to be discharged to: Unknown   Condition of Participation: Discharge Planning   Freedom of Choice list was provided with basic dialogue that supports the patient's individualized plan of care/goals, treatment preferences, and shares the quality data associated with the providers?   Yes

## 2022-10-23 LAB
BACTERIA SPEC CULT: NORMAL
BACTERIA SPEC CULT: NORMAL
SERVICE CMNT-IMP: NORMAL
SERVICE CMNT-IMP: NORMAL

## 2022-10-28 NOTE — ADT AUTH CERT
RONI BULLOCK- CONFIRMATION THAT NF WAS SUCCESSFUL ON OUR END.     Caitlyn Gramajo [phone]  Male [birth year] (56 yrs)     Address Phone Email Employer